# Patient Record
Sex: MALE | Race: WHITE | NOT HISPANIC OR LATINO | Employment: OTHER | URBAN - METROPOLITAN AREA
[De-identification: names, ages, dates, MRNs, and addresses within clinical notes are randomized per-mention and may not be internally consistent; named-entity substitution may affect disease eponyms.]

---

## 2017-02-16 ENCOUNTER — OFFICE VISIT (OUTPATIENT)
Dept: PODIATRY | Facility: CLINIC | Age: 72
End: 2017-02-16
Payer: MEDICARE

## 2017-02-16 VITALS
SYSTOLIC BLOOD PRESSURE: 114 MMHG | BODY MASS INDEX: 21 KG/M2 | HEIGHT: 71 IN | WEIGHT: 150 LBS | DIASTOLIC BLOOD PRESSURE: 60 MMHG

## 2017-02-16 DIAGNOSIS — M20.10 HALLUX ABDUCTO VALGUS, UNSPECIFIED LATERALITY: ICD-10-CM

## 2017-02-16 DIAGNOSIS — E11.49 TYPE II DIABETES MELLITUS WITH NEUROLOGICAL MANIFESTATIONS: ICD-10-CM

## 2017-02-16 DIAGNOSIS — L90.9 PLANTAR FAT PAD ATROPHY: ICD-10-CM

## 2017-02-16 DIAGNOSIS — E11.51 TYPE II DIABETES MELLITUS WITH PERIPHERAL CIRCULATORY DISORDER: Primary | ICD-10-CM

## 2017-02-16 DIAGNOSIS — B35.1 ONYCHOMYCOSIS DUE TO DERMATOPHYTE: ICD-10-CM

## 2017-02-16 DIAGNOSIS — L84 CORN OR CALLUS: ICD-10-CM

## 2017-02-16 DIAGNOSIS — M20.40 HAMMER TOE, UNSPECIFIED LATERALITY: ICD-10-CM

## 2017-02-16 PROCEDURE — 11056 PARNG/CUTG B9 HYPRKR LES 2-4: CPT | Mod: Q9,S$GLB,, | Performed by: PODIATRIST

## 2017-02-16 PROCEDURE — 99999 PR PBB SHADOW E&M-EST. PATIENT-LVL III: CPT | Mod: PBBFAC,,, | Performed by: PODIATRIST

## 2017-02-16 PROCEDURE — 99499 UNLISTED E&M SERVICE: CPT | Mod: S$GLB,,, | Performed by: PODIATRIST

## 2017-02-16 PROCEDURE — 11721 DEBRIDE NAIL 6 OR MORE: CPT | Mod: 59,Q9,S$GLB, | Performed by: PODIATRIST

## 2017-02-16 RX ORDER — SYRINGE,SAFETY WITH NEEDLE,1ML 25GX1"
SYRINGE (EA) MISCELLANEOUS
COMMUNITY
Start: 2017-01-24

## 2017-02-16 NOTE — PROGRESS NOTES
Subjective:      Patient ID: Avni Daniels is a 72 y.o. male.    Chief Complaint: Diabetes Mellitus (pcp Dr. Gusman 02/2017); Diabetic Foot Exam; and Nail Care    Avni is a 72 y.o. male who presents to the clinic for evaluation and treatment of high risk feet. Avni has a past medical history of Diabetes mellitus, type 2 and Hypertension. The patient's chief complaint is elongated, thickened toenails aggravated by increased weight bearing, shoe gear, pressure.    This patient has documented high risk feet requiring routine maintenance secondary to diabetes mellitis and those secondary complications of diabetes, as mentioned..    PCP: Reyes Gusman MD    Date Last Seen by PCP:   Chief Complaint   Patient presents with    Diabetes Mellitus     pcp Dr. Gusman 02/2017    Diabetic Foot Exam    Nail Care       Current shoe gear:   Rx diabetic extra depth shoes and custom accommodative insoles      No results found for: HGBA1C        Patient Active Problem List   Diagnosis    Diabetic ulcer of left foot associated with type 2 diabetes mellitus    Non-pressure chronic ulcer of other part of left foot with fat layer exposed       Current Outpatient Prescriptions on File Prior to Visit   Medication Sig Dispense Refill    aspirin 81 MG Chew Take 81 mg by mouth once daily.      clopidogrel (PLAVIX) 75 mg tablet       enalapril (VASOTEC) 5 MG tablet       gentamicin (GARAMYCIN) 0.1 % cream       insulin NPH-insulin regular, 70/30, (NOVOLIN 70/30) 100 unit/mL (70-30) injection Inject into the skin 2 (two) times daily.      PACERONE 100 mg Tab       pravastatin (PRAVACHOL) 40 MG tablet        No current facility-administered medications on file prior to visit.        Review of patient's allergies indicates:  No Known Allergies    Past Surgical History   Procedure Laterality Date    Appendectomy      Gallbladder surgery      Tumor removal Left      foot       History reviewed. No pertinent family history.    Social  "History     Social History    Marital status:      Spouse name: N/A    Number of children: N/A    Years of education: N/A     Occupational History    Not on file.     Social History Main Topics    Smoking status: Former Smoker    Smokeless tobacco: Not on file    Alcohol use Not on file    Drug use: Not on file    Sexual activity: Not on file     Other Topics Concern    Not on file     Social History Narrative         Review of Systems   Constitution: Negative for chills, fever and weakness.   Cardiovascular: Negative for claudication and leg swelling.   Respiratory: Negative for cough and shortness of breath.    Skin: Positive for dry skin, nail changes and poor wound healing (hx of left foot ulcer). Negative for itching and rash.   Musculoskeletal: Positive for joint pain. Negative for falls, joint swelling and muscle weakness.   Gastrointestinal: Negative for diarrhea, nausea and vomiting.   Neurological: Positive for numbness and paresthesias. Negative for tremors.   Psychiatric/Behavioral: Negative for altered mental status and hallucinations.           Objective:       Vitals:    02/16/17 1014   BP: 114/60   Weight: 68 kg (150 lb)   Height: 5' 11" (1.803 m)   PainSc: 0-No pain       Physical Exam   Constitutional:  Non-toxic appearance. He does not have a sickly appearance. No distress.   Pt. is well-developed, well-nourished, appears stated age, in no acute distress, alert and oriented x 3. No evidence of depression, anxiety, or agitation. Calm, cooperative, and communicative. Appropriate interactions and affect.   Cardiovascular:   Pulses:       Dorsalis pedis pulses are 1+ on the right side, and 1+ on the left side.        Posterior tibial pulses are 1+ on the right side, and 1+ on the left side.   Dorsalis pedis and posterior tibial pulses are diminished Bilaterally. Toes are cool to touch. Feet are warm proximally.There is decreased digital hair. Skin is atrophic   Pulmonary/Chest: No " respiratory distress.   Musculoskeletal:        Right ankle: No tenderness. No lateral malleolus, no medial malleolus, no AITFL, no CF ligament and no posterior TFL tenderness found. Achilles tendon exhibits no pain, no defect and normal Elias's test results.        Left ankle: No tenderness. No lateral malleolus, no medial malleolus, no AITFL, no CF ligament and no posterior TFL tenderness found. Achilles tendon exhibits no pain, no defect and normal Elias's test results.        Right foot: There is no tenderness and no bony tenderness.        Left foot: There is no tenderness and no bony tenderness.   Decreased stride, station of gait.  apropulsive toe off.  Increased angle and base of gait.    Patient has hammertoes of digits 2-5 bilateral partially reducible without symptom today.    Visible and palpable bunion without pain at dorsomedial 1st metatarsal head right and left.  Hallux abducted right and left partially reducible, tracks laterally without being track bound.  No ecchymosis, erythema, edema, or cardinal signs infection or signs of trauma same foot.    Visible and palpable tailors bunion at dorsolateral 5th metatarsal head right and left.  5th digit is varus right and left partially reducible. No ecchymosis, erythema, edema, or cardinal signs infection.    Fat pad atrophy to heels and met heads bilateral     Lymphadenopathy:   No lymphatic streaking    Negative lymphadenopathy bilateral popliteal fossa and tarsal tunnel.     Neurological:   Montgomery-Tamanna 5.07 monofilamant testing is diminished Ryan feet. Decreased/absent vibratory sensation bilateral feet to 128Hz tuning fork.     Paresthesias, and hyperesthesia bilateral feet with no clearly identified trigger or source.           Skin: Skin is warm, dry and intact. No abrasion, no burn, no ecchymosis, no laceration, no petechiae and no rash noted. He is not diaphoretic. No cyanosis. No pallor. Nails show no clubbing.   Skin is thin, atrophic,  xerotic    Toenails 1-5 bilaterally are elongated by 2-3 mm, thickened by 2-3 mm, discolored/yellowed, dystrophic, brittle with subungual debris.    Focal hyperkeratotic lesion consisting entirely of hyperkeratotic tissue without open skin, drainage, pus, fluctuance, malodor, or signs of infection: sub 1st and 5th MTPJ luann   Psychiatric: His mood appears not anxious. His affect is not inappropriate. His speech is not slurred. He is not combative. He is communicative. He is attentive.   Nursing note reviewed.        Assessment:       Encounter Diagnoses   Name Primary?    Type II diabetes mellitus with peripheral circulatory disorder Yes    Type II diabetes mellitus with neurological manifestations     Corn or callus     Onychomycosis due to dermatophyte     Hammer toe, unspecified laterality     Hallux abducto valgus, unspecified laterality     Plantar fat pad atrophy          Plan:       Avni was seen today for diabetes mellitus, diabetic foot exam and nail care.    Diagnoses and all orders for this visit:    Type II diabetes mellitus with peripheral circulatory disorder    Type II diabetes mellitus with neurological manifestations    Corn or callus    Onychomycosis due to dermatophyte    Hammer toe, unspecified laterality    Hallux abducto valgus, unspecified laterality    Plantar fat pad atrophy    I counseled the patient on his conditions, their implications and medical management.    Greater than 50% of this visit spent on counseling and coordination of care.    Shoe inspection. Diabetic Foot Education. Patient reminded of the importance of good nutrition and blood sugar control to help prevent podiatric complications of diabetes. Patient instructed on proper foot hygeine. We discussed wearing proper shoe gear, daily foot inspections, never walking without protective shoe gear, never putting sharp instruments to feet.      - With patient's permission, nails were aggressively reduced and debrided x 10 to  their soft tissue attachment mechanically and with electric , removing all offending nail and debris. Patient relates relief following the procedure.     After cleansing the  area w/ alcohol prep pad the above mentioned hyperkeratosis was trimmed utilizing No 15 scapel, to a smooth base with out incident. Patient tolerated this  well and reported comfort to the area of sub 1st and 5th MTPJ bilaterally    He will continue to monitor the areas daily, inspect her feet, wear protective shoe gear when ambulatory, moisturizer to maintain skin integrity and follow in this office in approximately  2-3 months, sooner p.r.n.

## 2017-02-16 NOTE — MR AVS SNAPSHOT
[facility]  [address]  [place] [address]  Phone: [phone]                  Avni Daniels   2017 10:15 AM   Office Visit    Description:  Male : 1945   Provider:  Miryam Harrell DPM   Department:  [facility]           Reason for Visit     Diabetes Mellitus     Diabetic Foot Exam     Nail Care           Diagnoses this Visit        Comments    Type II diabetes mellitus with peripheral circulatory disorder    -  Primary     Type II diabetes mellitus with neurological manifestations         Corn or callus         Onychomycosis due to dermatophyte         Hammer toe, unspecified laterality         Hallux abducto valgus, unspecified laterality         Plantar fat pad atrophy                To Do List           Future Appointments        Provider Department Dept Phone    2017 10:15 AM Miryam Harrell DPM [facility] [phone]      Goals (5 Years of Data)     None      [facility]     [facility] On Call Nurse Care Line -  Assistance  Registered nurses in the [facility] Center provide clinical advisement, health education, appointment booking, and other advisory services.  Call for this free service at [phone].             Medications           Message regarding Medications     Verify the changes and/or additions to your medication regime listed below are the same as discussed with your clinician today.  If any of these changes or additions are incorrect, please notify your healthcare provider.             Verify that the below list of medications is an accurate representation of the medications you are currently taking.  If none reported, the list may be blank. If incorrect, please contact your healthcare provider. Carry this list with you in case of emergency.           Current Medications     aspirin 81 MG Chew Take 81 mg by mouth once daily.    clopidogrel (PLAVIX) 75 mg tablet     enalapril (VASOTEC) 5 MG tablet     gentamicin (GARAMYCIN) 0.1  "% cream     insulin NPH-insulin regular, 70/30, (NOVOLIN 70/30) 100 unit/mL (70-30) injection Inject into the skin 2 (two) times daily.    insulin syringe-needle U-100 1/2 mL 30 gauge x 5/16 Syrg     PACERONE 100 mg Tab     pravastatin (PRAVACHOL) 40 MG tablet            Clinical Reference Information           Your Vitals Were     BP Height Weight BMI       114/60 5' 11" (1.803 m) 68 kg (150 lb) 20.92 kg/m2       Blood Pressure          Most Recent Value    BP  114/60      Allergies as of 2/16/2017     No Known Allergies      Immunizations Administered on Date of Encounter - 2/16/2017     None      [facility] Sign-Up     Activating your [facility] account is as easy as 1-2-3!     1) Visit [email or web address], select Sign Up Now, enter this activation code and your date of birth, then select Next.  [email or web address]  Expires: 4/2/2017 10:49 AM      2) Create a username and password to use when you visit [facility] in the future and select a security question in case you lose your password and select Next.    3) Enter your e-mail address and click Sign Up!    Additional Information  If you have questions, please e-mail [email or web address] or call [phone] to talk to our [facility] staff. Remember, [facility] is NOT to be used for urgent needs. For medical emergencies, dial 911.         Instructions    Recommend lotions: eucerin, aquaphor, A&D ointment, gold bond for diabetics, sween    Shoe recommendations: (try 6pm.com, zappos.com , nordstromrack.com, or shoes.com for discounted prices) you can visit DSW shoes in [place] as well    Asics (GT 1000 or gel foundations), new balance, saucony (stabil c3),  Drew (transcend), vionic, propet (tennis shoe)    soft brand, clarks, crocs, aerosoles, naturalizers, SAS, ecco, rafael, ant good, rockports (dress shoes)    Vionic, volitiles, burkenstocks, fitflops, propet (sandals)    Nike comfort thong sandals, crocs (house shoes)    Nail Home remedy:  Vicks Vapor rub OR Listerine " and apple cider vinegar in a spray bottle to nails    Occasional soaks for 15-20 mins in luke warm water with 1 cup of listerine and 1 cup of apple cider vinegar are ok You may add several drops of oil of oregano or tea tree oil as well      Diabetes: Inspecting Your Feet  Diabetes increases your chances of developing foot problems. So inspect your feet every day. This helps you find small skin irritations before they become serious infections. If you have trouble seeing the bottoms of your feet, use a mirror or ask a family member or friend to help.     Pressure spots on the bottom of the foot are common areas where problems develop.   How to check your feet  Below are tips to help you look for foot problems. Try to check your feet at the same time each day, such as when you get out of bed in the morning:  · Check the top of each foot. The tops of toes, back of the heel, and outer edge of the foot can get a lot of rubbing from poor-fitting shoes.  · Check the bottom of each foot. Daily wear and tear often leads to problems at pressure spots.  · Check the toes and nails. Fungal infections often occur between toes. Toenail problems can also be a sign of fungal infections or lead to breaks in the skin.  · Check your shoes, too. Loose objects inside a shoe can injure the foot. Use your hand to feel inside your shoes for things like robert, loose stitching, or rough areas that could irritate your skin.  Warning signs  Look for any color changes in the foot. Redness with streaks can signal a severe infection, which needs immediate medical attention. Tell your doctor right away if you have any of these problems:  · Swelling, sometimes with color changes, may be a sign of poor blood flow or infection. Symptoms include tenderness and an increase in the size of your foot.  · Warm or hot areas on your feet may be signs of infection. A foot that is cold may not be getting enough blood.  · Sensations such as burning, tingling,  or pins and needles can be signs of a problem. Also check for areas that may be numb.  · Hot spots are caused by friction or pressure. Look for hot spots in areas that get a lot of rubbing. Hot spots can turn into blisters, calluses, or sores.  · Cracks and sores are caused by dry or irritated skin. They are a sign that the skin is breaking down, which can lead to infection.  · Toenail problems to watch for include nails growing into the skin (ingrown toenail) and causing redness or pain. Thick, yellow, or discolored nails can signal a fungal infection.  · Drainage and odor can develop from untreated sores and ulcers. Call your doctor right away if you notice white or yellow drainage, bleeding, or unpleasant odor.   © 4185-4772 [employer]. [address] [place] [address]. All rights reserved. This information is not intended as a substitute for professional medical care. Always follow your healthcare professional's instructions.                 Language Assistance Services     ATTENTION: Language assistance services are available, free of charge. Please call [phone].      ATENCIÓN: Si warren boggs, tiene a salazar disposición servicios gratuitos de asistencia lingüística. Llame al [phone].     KARY Ý: N?u b?n nói Ti?ng Vi?t, có các d?ch v? h? tr? ngôn ng? mi?n phí dành cho b?n. G?i s? [phone].         [facility] complies with applicable Federal civil rights laws and does not discriminate on the basis of race, color, national origin, age, disability, or sex.

## 2017-02-16 NOTE — PATIENT INSTRUCTIONS
Recommend lotions: eucerin, aquaphor, A&D ointment, gold bond for diabetics, sween    Shoe recommendations: (try 6pm.com, zappos.com , nordstromrack.com, or shoes.com for discounted prices) you can visit DSW shoes in [place] as well    Asics (GT 1000 or gel foundations), new balance, saucony (stabil c3),  Drew (transcend), vionic, propet (tennis shoe)    soft brand, clarks, crocs, aerosoles, naturalizers, SAS, ecco, rafael, ant good, rockports (dress shoes)    Vionic, volitiles, burkenstocks, fitflops, propet (sandals)    Nike comfort thong sandals, crocs (house shoes)    Nail Home remedy:  Vicks Vapor rub OR Listerine and apple cider vinegar in a spray bottle to nails    Occasional soaks for 15-20 mins in luke warm water with 1 cup of listerine and 1 cup of apple cider vinegar are ok You may add several drops of oil of oregano or tea tree oil as well      Diabetes: Inspecting Your Feet  Diabetes increases your chances of developing foot problems. So inspect your feet every day. This helps you find small skin irritations before they become serious infections. If you have trouble seeing the bottoms of your feet, use a mirror or ask a family member or friend to help.     Pressure spots on the bottom of the foot are common areas where problems develop.   How to check your feet  Below are tips to help you look for foot problems. Try to check your feet at the same time each day, such as when you get out of bed in the morning:  · Check the top of each foot. The tops of toes, back of the heel, and outer edge of the foot can get a lot of rubbing from poor-fitting shoes.  · Check the bottom of each foot. Daily wear and tear often leads to problems at pressure spots.  · Check the toes and nails. Fungal infections often occur between toes. Toenail problems can also be a sign of fungal infections or lead to breaks in the skin.  · Check your shoes, too. Loose objects inside a shoe can injure the foot. Use your hand to feel  inside your shoes for things like robert, loose stitching, or rough areas that could irritate your skin.  Warning signs  Look for any color changes in the foot. Redness with streaks can signal a severe infection, which needs immediate medical attention. Tell your doctor right away if you have any of these problems:  · Swelling, sometimes with color changes, may be a sign of poor blood flow or infection. Symptoms include tenderness and an increase in the size of your foot.  · Warm or hot areas on your feet may be signs of infection. A foot that is cold may not be getting enough blood.  · Sensations such as burning, tingling, or pins and needles can be signs of a problem. Also check for areas that may be numb.  · Hot spots are caused by friction or pressure. Look for hot spots in areas that get a lot of rubbing. Hot spots can turn into blisters, calluses, or sores.  · Cracks and sores are caused by dry or irritated skin. They are a sign that the skin is breaking down, which can lead to infection.  · Toenail problems to watch for include nails growing into the skin (ingrown toenail) and causing redness or pain. Thick, yellow, or discolored nails can signal a fungal infection.  · Drainage and odor can develop from untreated sores and ulcers. Call your doctor right away if you notice white or yellow drainage, bleeding, or unpleasant odor.   © 4599-3295 [employer]. [address] [place] [address], [place] [address]. All rights reserved. This information is not intended as a substitute for professional medical care. Always follow your healthcare professional's instructions.

## 2017-05-18 ENCOUNTER — OFFICE VISIT (OUTPATIENT)
Dept: PODIATRY | Facility: CLINIC | Age: 72
End: 2017-05-18
Payer: MEDICARE

## 2017-05-18 VITALS
HEIGHT: 71 IN | BODY MASS INDEX: 21 KG/M2 | WEIGHT: 150 LBS | SYSTOLIC BLOOD PRESSURE: 114 MMHG | DIASTOLIC BLOOD PRESSURE: 66 MMHG

## 2017-05-18 DIAGNOSIS — E11.49 TYPE II DIABETES MELLITUS WITH NEUROLOGICAL MANIFESTATIONS: ICD-10-CM

## 2017-05-18 DIAGNOSIS — E11.51 TYPE II DIABETES MELLITUS WITH PERIPHERAL CIRCULATORY DISORDER: Primary | ICD-10-CM

## 2017-05-18 DIAGNOSIS — L84 CORN OR CALLUS: ICD-10-CM

## 2017-05-18 DIAGNOSIS — B35.1 ONYCHOMYCOSIS DUE TO DERMATOPHYTE: ICD-10-CM

## 2017-05-18 PROCEDURE — 11056 PARNG/CUTG B9 HYPRKR LES 2-4: CPT | Mod: Q9,S$GLB,, | Performed by: PODIATRIST

## 2017-05-18 PROCEDURE — 11721 DEBRIDE NAIL 6 OR MORE: CPT | Mod: 59,Q9,S$GLB, | Performed by: PODIATRIST

## 2017-05-18 PROCEDURE — 99999 PR PBB SHADOW E&M-EST. PATIENT-LVL II: CPT | Mod: PBBFAC,,, | Performed by: PODIATRIST

## 2017-05-18 PROCEDURE — 99499 UNLISTED E&M SERVICE: CPT | Mod: S$GLB,,, | Performed by: PODIATRIST

## 2017-05-18 RX ORDER — CALCITRIOL 0.25 UG/1
CAPSULE ORAL
COMMUNITY
Start: 2017-04-06 | End: 2018-06-12

## 2017-05-18 NOTE — PROGRESS NOTES
Subjective:      Patient ID: Avni Daniels is a 72 y.o. male.    Chief Complaint: Diabetic Foot Exam (Pcp Dr. Gusman 02/2017); Diabetes Mellitus; Nail Care; and Callouses    Avni is a 72 y.o. male who presents to the clinic for evaluation and treatment of high risk feet. Avni has a past medical history of Diabetes mellitus, type 2 and Hypertension. The patient's chief complaint is elongated, thickened toenails aggravated by increased weight bearing, shoe gear, pressure.    This patient has documented high risk feet requiring routine maintenance secondary to diabetes mellitis and those secondary complications of diabetes, as mentioned..    PCP: Reyes Gusman MD    Date Last Seen by PCP:   Chief Complaint   Patient presents with    Diabetic Foot Exam     Pcp Dr. Gusman 02/2017    Diabetes Mellitus    Nail Care    Callouses       Current shoe gear:   Rx diabetic extra depth shoes and custom accommodative insoles      No results found for: HGBA1C        Patient Active Problem List   Diagnosis    Diabetic ulcer of left foot associated with type 2 diabetes mellitus    Non-pressure chronic ulcer of other part of left foot with fat layer exposed       Current Outpatient Prescriptions on File Prior to Visit   Medication Sig Dispense Refill    aspirin 81 MG Chew Take 81 mg by mouth once daily.      clopidogrel (PLAVIX) 75 mg tablet       enalapril (VASOTEC) 5 MG tablet 10 mg.       gentamicin (GARAMYCIN) 0.1 % cream       insulin NPH-insulin regular, 70/30, (NOVOLIN 70/30) 100 unit/mL (70-30) injection Inject into the skin 2 (two) times daily.      insulin syringe-needle U-100 1/2 mL 30 gauge x 5/16 Syrg       PACERONE 100 mg Tab       pravastatin (PRAVACHOL) 40 MG tablet        No current facility-administered medications on file prior to visit.        Review of patient's allergies indicates:  No Known Allergies    Past Surgical History:   Procedure Laterality Date    APPENDECTOMY      GALLBLADDER SURGERY   "    TUMOR REMOVAL Left     foot       No family history on file.    Social History     Social History    Marital status:      Spouse name: N/A    Number of children: N/A    Years of education: N/A     Occupational History    Not on file.     Social History Main Topics    Smoking status: Former Smoker    Smokeless tobacco: Not on file    Alcohol use Not on file    Drug use: Not on file    Sexual activity: Not on file     Other Topics Concern    Not on file     Social History Narrative         Review of Systems   Constitution: Negative for chills, fever and weakness.   Cardiovascular: Negative for claudication and leg swelling.   Respiratory: Negative for cough and shortness of breath.    Skin: Positive for dry skin, nail changes and poor wound healing (hx of left foot ulcer). Negative for itching and rash.   Musculoskeletal: Positive for joint pain. Negative for falls, joint swelling and muscle weakness.   Gastrointestinal: Negative for diarrhea, nausea and vomiting.   Neurological: Positive for numbness and paresthesias. Negative for tremors.   Psychiatric/Behavioral: Negative for altered mental status and hallucinations.           Objective:       Vitals:    05/18/17 1031   BP: 114/66   Weight: 68 kg (150 lb)   Height: 5' 11" (1.803 m)   PainSc: 0-No pain       Physical Exam   Constitutional:  Non-toxic appearance. He does not have a sickly appearance. No distress.   Pt. is well-developed, well-nourished, appears stated age, in no acute distress, alert and oriented x 3. No evidence of depression, anxiety, or agitation. Calm, cooperative, and communicative. Appropriate interactions and affect.   Cardiovascular:   Pulses:       Dorsalis pedis pulses are 1+ on the right side, and 1+ on the left side.        Posterior tibial pulses are 1+ on the right side, and 1+ on the left side.   Dorsalis pedis and posterior tibial pulses are diminished Bilaterally. Toes are cool to touch. Feet are warm " proximally.There is decreased digital hair. Skin is atrophic   Pulmonary/Chest: No respiratory distress.   Musculoskeletal:        Right ankle: No tenderness. No lateral malleolus, no medial malleolus, no AITFL, no CF ligament and no posterior TFL tenderness found. Achilles tendon exhibits no pain, no defect and normal Elias's test results.        Left ankle: No tenderness. No lateral malleolus, no medial malleolus, no AITFL, no CF ligament and no posterior TFL tenderness found. Achilles tendon exhibits no pain, no defect and normal Elias's test results.        Right foot: There is no tenderness and no bony tenderness.        Left foot: There is no tenderness and no bony tenderness.   Decreased stride, station of gait.  apropulsive toe off.  Increased angle and base of gait.    Patient has hammertoes of digits 2-5 bilateral partially reducible without symptom today.    Visible and palpable bunion without pain at dorsomedial 1st metatarsal head right and left.  Hallux abducted right and left partially reducible, tracks laterally without being track bound.  No ecchymosis, erythema, edema, or cardinal signs infection or signs of trauma same foot.    Visible and palpable tailors bunion at dorsolateral 5th metatarsal head right and left.  5th digit is varus right and left partially reducible. No ecchymosis, erythema, edema, or cardinal signs infection.    Fat pad atrophy to heels and met heads bilateral     Lymphadenopathy:   No lymphatic streaking    Negative lymphadenopathy bilateral popliteal fossa and tarsal tunnel.     Neurological:   Galloway-Tamanna 5.07 monofilamant testing is diminished Ryan feet. Decreased/absent vibratory sensation bilateral feet to 128Hz tuning fork.     Paresthesias, and hyperesthesia bilateral feet with no clearly identified trigger or source.           Skin: Skin is warm, dry and intact. No abrasion, no burn, no ecchymosis, no laceration, no petechiae and no rash noted. He is not  diaphoretic. No cyanosis. No pallor. Nails show no clubbing.   Skin is thin, atrophic, xerotic    Toenails 1-5 bilaterally are elongated by 2-3 mm, thickened by 2-3 mm, discolored/yellowed, dystrophic, brittle with subungual debris.    Focal hyperkeratotic lesion consisting entirely of hyperkeratotic tissue without open skin, drainage, pus, fluctuance, malodor, or signs of infection: sub 1st and 5th MTPJ luann   Psychiatric: His mood appears not anxious. His affect is not inappropriate. His speech is not slurred. He is not combative. He is communicative. He is attentive.   Nursing note reviewed.        Assessment:       Encounter Diagnoses   Name Primary?    Type II diabetes mellitus with peripheral circulatory disorder Yes    Type II diabetes mellitus with neurological manifestations     Onychomycosis due to dermatophyte     Corn or callus          Plan:       Avni was seen today for diabetic foot exam, diabetes mellitus, nail care and callouses.    Diagnoses and all orders for this visit:    Type II diabetes mellitus with peripheral circulatory disorder    Type II diabetes mellitus with neurological manifestations    Onychomycosis due to dermatophyte    Corn or callus      I counseled the patient on his conditions, their implications and medical management.    Shoe inspection. Diabetic Foot Education. Patient reminded of the importance of good nutrition and blood sugar control to help prevent podiatric complications of diabetes. Patient instructed on proper foot hygeine. We discussed wearing proper shoe gear, daily foot inspections, never walking without protective shoe gear, never putting sharp instruments to feet.      - With patient's permission, nails were aggressively reduced and debrided x 10 to their soft tissue attachment mechanically and with electric , removing all offending nail and debris. Patient relates relief following the procedure.     After cleansing the  area w/ alcohol prep pad the  above mentioned hyperkeratosis was trimmed utilizing No 15 scapel, to a smooth base with out incident. Patient tolerated this  well and reported comfort to the area of sub 1st and 5th MTPJ bilaterally    He will continue to monitor the areas daily, inspect her feet, wear protective shoe gear when ambulatory, moisturizer to maintain skin integrity and follow in this office in approximately  2-3 months, sooner p.r.n.

## 2017-05-18 NOTE — MR AVS SNAPSHOT
[facility]  [address] [facility]  [place] [address]  Phone: [phone]                  Avni Daniels   2017 10:15 AM   Office Visit    Description:  Male : 1945   Provider:  Miryam Harrell DPM   Department:  [facility]           Reason for Visit     Diabetic Foot Exam     Diabetes Mellitus     Nail Care     Callouses                To Do List           Future Appointments        Provider Department Dept Phone    2017 10:30 AM Miryam Harrell DPM [facility] [phone]      Goals (5 Years of Data)     None      [facility]     [facility] -  Assistance  Unless otherwise directed by your provider, please contact [facility], our nurse care line that is available for  assistance.     Registered nurses in the [facility] Center provide: appointment scheduling, clinical advisement, health education, and other advisory services.  Call: [phone] (toll free)               Medications           Message regarding Medications     Verify the changes and/or additions to your medication regime listed below are the same as discussed with your clinician today.  If any of these changes or additions are incorrect, please notify your healthcare provider.             Verify that the below list of medications is an accurate representation of the medications you are currently taking.  If none reported, the list may be blank. If incorrect, please contact your healthcare provider. Carry this list with you in case of emergency.           Current Medications     aspirin 81 MG Chew Take 81 mg by mouth once daily.    calcitRIOL (ROCALTROL) 0.25 MCG Cap     clopidogrel (PLAVIX) 75 mg tablet     enalapril (VASOTEC) 5 MG tablet 10 mg.     gentamicin (GARAMYCIN) 0.1 % cream     insulin NPH-insulin regular, 70/30, (NOVOLIN 70/30) 100 unit/mL (70-30) injection Inject into the skin 2 (two) times daily.    insulin syringe-needle U-100 1/2 mL 30 gauge x 5/16  "Syrg     PACERONE 100 mg Tab     pravastatin (PRAVACHOL) 40 MG tablet            Clinical Reference Information           Your Vitals Were     BP Height Weight BMI       114/66 5' 11" (1.803 m) 68 kg (150 lb) 20.92 kg/m2       Blood Pressure          Most Recent Value    BP  114/66      Allergies as of 5/18/2017     No Known Allergies      Immunizations Administered on Date of Encounter - 5/18/2017     None      [facility] Sign-Up     Activating your [facility] account is as easy as 1-2-3!     1) Visit [email or web address], select Sign Up Now, enter this activation code and your date of birth, then select Next.  [email or web address]  Expires: 7/2/2017 10:55 AM      2) Create a username and password to use when you visit [facility] in the future and select a security question in case you lose your password and select Next.    3) Enter your e-mail address and click Sign Up!    Additional Information  If you have questions, please e-mail [email or web address] or call [phone] to talk to our [facility] staff. Remember, [facility] is NOT to be used for urgent needs. For medical emergencies, dial 911.         Language Assistance Services     ATTENTION: Language assistance services are available, free of charge. Please call [phone].      ATENCIÓN: Si habla español, tiene a salazar disposición servicios gratuitos de asistencia lingüística. Llame al [phone].     CHÚ Ý: N?u b?n nói Ti?ng Vi?t, có các d?ch v? h? tr? ngôn ng? mi?n phí dành cho b?n. G?i s? [phone].         [facility] complies with applicable Federal civil rights laws and does not discriminate on the basis of race, color, national origin, age, disability, or sex.        "

## 2017-08-21 ENCOUNTER — OFFICE VISIT (OUTPATIENT)
Dept: PODIATRY | Facility: CLINIC | Age: 72
End: 2017-08-21
Payer: MEDICARE

## 2017-08-21 VITALS
HEIGHT: 71 IN | WEIGHT: 150 LBS | DIASTOLIC BLOOD PRESSURE: 67 MMHG | BODY MASS INDEX: 21 KG/M2 | SYSTOLIC BLOOD PRESSURE: 110 MMHG

## 2017-08-21 DIAGNOSIS — B35.1 ONYCHOMYCOSIS DUE TO DERMATOPHYTE: ICD-10-CM

## 2017-08-21 DIAGNOSIS — E11.49 TYPE II DIABETES MELLITUS WITH NEUROLOGICAL MANIFESTATIONS: Primary | ICD-10-CM

## 2017-08-21 DIAGNOSIS — M20.40 HAMMER TOE, UNSPECIFIED LATERALITY: ICD-10-CM

## 2017-08-21 DIAGNOSIS — M20.10 HALLUX ABDUCTO VALGUS, UNSPECIFIED LATERALITY: ICD-10-CM

## 2017-08-21 DIAGNOSIS — E11.51 TYPE II DIABETES MELLITUS WITH PERIPHERAL CIRCULATORY DISORDER: ICD-10-CM

## 2017-08-21 DIAGNOSIS — L84 CORN OR CALLUS: ICD-10-CM

## 2017-08-21 DIAGNOSIS — L90.9 PLANTAR FAT PAD ATROPHY: ICD-10-CM

## 2017-08-21 PROCEDURE — 11056 PARNG/CUTG B9 HYPRKR LES 2-4: CPT | Mod: Q9,S$GLB,, | Performed by: PODIATRIST

## 2017-08-21 PROCEDURE — 99499 UNLISTED E&M SERVICE: CPT | Mod: S$GLB,,, | Performed by: PODIATRIST

## 2017-08-21 PROCEDURE — 11721 DEBRIDE NAIL 6 OR MORE: CPT | Mod: 59,Q9,S$GLB, | Performed by: PODIATRIST

## 2017-08-21 PROCEDURE — 99999 PR PBB SHADOW E&M-EST. PATIENT-LVL III: CPT | Mod: PBBFAC,,, | Performed by: PODIATRIST

## 2017-08-21 RX ORDER — ENALAPRIL MALEATE 10 MG/1
TABLET ORAL
COMMUNITY
Start: 2017-08-03 | End: 2018-06-12 | Stop reason: DRUGHIGH

## 2017-08-21 RX ORDER — CALCITRIOL 0.5 UG/1
CAPSULE ORAL
COMMUNITY
Start: 2017-07-07 | End: 2018-03-13

## 2017-08-22 NOTE — PROGRESS NOTES
Subjective:      Patient ID: Avni Daniels is a 72 y.o. male.    Chief Complaint: Diabetes Mellitus (pcp Dr. Gusman 2mos ago ); Diabetic Foot Exam; and Nail Care    Avni is a 72 y.o. male who presents to the clinic for evaluation and treatment of high risk feet. Avni has a past medical history of Diabetes mellitus, type 2 and Hypertension. The patient's chief complaint is elongated, thickened toenails aggravated by increased weight bearing, shoe gear, pressure.    This patient has documented high risk feet requiring routine maintenance secondary to diabetes mellitis and those secondary complications of diabetes, as mentioned..    PCP: Reyes Gusman MD    Date Last Seen by PCP:   Chief Complaint   Patient presents with    Diabetes Mellitus     pcp Dr. Gusman 2mos ago     Diabetic Foot Exam    Nail Care       Current shoe gear:   Rx diabetic extra depth shoes and custom accommodative insoles      No results found for: HGBA1C        Patient Active Problem List   Diagnosis    Diabetic ulcer of left foot associated with type 2 diabetes mellitus    Non-pressure chronic ulcer of other part of left foot with fat layer exposed       Current Outpatient Prescriptions on File Prior to Visit   Medication Sig Dispense Refill    aspirin 81 MG Chew Take 81 mg by mouth once daily.      calcitRIOL (ROCALTROL) 0.25 MCG Cap       clopidogrel (PLAVIX) 75 mg tablet       enalapril (VASOTEC) 5 MG tablet 10 mg.       gentamicin (GARAMYCIN) 0.1 % cream       insulin NPH-insulin regular, 70/30, (NOVOLIN 70/30) 100 unit/mL (70-30) injection Inject into the skin 2 (two) times daily.      insulin syringe-needle U-100 1/2 mL 30 gauge x 5/16 Syrg       PACERONE 100 mg Tab       pravastatin (PRAVACHOL) 40 MG tablet        No current facility-administered medications on file prior to visit.        Review of patient's allergies indicates:  No Known Allergies    Past Surgical History:   Procedure Laterality Date    APPENDECTOMY    "   GALLBLADDER SURGERY      TUMOR REMOVAL Left     foot       History reviewed. No pertinent family history.    Social History     Social History    Marital status:      Spouse name: N/A    Number of children: N/A    Years of education: N/A     Occupational History    Not on file.     Social History Main Topics    Smoking status: Former Smoker    Smokeless tobacco: Former User    Alcohol use Not on file    Drug use: Unknown    Sexual activity: Not on file     Other Topics Concern    Not on file     Social History Narrative    No narrative on file         Review of Systems   Constitution: Negative for chills, fever and weakness.   Cardiovascular: Negative for claudication and leg swelling.   Respiratory: Negative for cough and shortness of breath.    Skin: Positive for dry skin, nail changes and poor wound healing (hx of left foot ulcer). Negative for itching and rash.   Musculoskeletal: Positive for joint pain. Negative for falls, joint swelling and muscle weakness.   Gastrointestinal: Negative for diarrhea, nausea and vomiting.   Neurological: Positive for numbness and paresthesias. Negative for tremors.   Psychiatric/Behavioral: Negative for altered mental status and hallucinations.           Objective:       Vitals:    08/21/17 1057   BP: 110/67   Weight: 68 kg (150 lb)   Height: 5' 11" (1.803 m)   PainSc: 0-No pain       Physical Exam   Constitutional:  Non-toxic appearance. He does not have a sickly appearance. No distress.   Pt. is well-developed, well-nourished, appears stated age, in no acute distress, alert and oriented x 3. No evidence of depression, anxiety, or agitation. Calm, cooperative, and communicative. Appropriate interactions and affect.   Cardiovascular:   Pulses:       Dorsalis pedis pulses are 1+ on the right side, and 1+ on the left side.        Posterior tibial pulses are 1+ on the right side, and 1+ on the left side.   Dorsalis pedis and posterior tibial pulses are " diminished Bilaterally. Toes are cool to touch. Feet are warm proximally.There is decreased digital hair. Skin is atrophic   Pulmonary/Chest: No respiratory distress.   Musculoskeletal:        Right ankle: No tenderness. No lateral malleolus, no medial malleolus, no AITFL, no CF ligament and no posterior TFL tenderness found. Achilles tendon exhibits no pain, no defect and normal Elias's test results.        Left ankle: No tenderness. No lateral malleolus, no medial malleolus, no AITFL, no CF ligament and no posterior TFL tenderness found. Achilles tendon exhibits no pain, no defect and normal Elias's test results.        Right foot: There is no tenderness and no bony tenderness.        Left foot: There is no tenderness and no bony tenderness.   Decreased stride, station of gait.  apropulsive toe off.  Increased angle and base of gait.    Patient has hammertoes of digits 2-5 bilateral partially reducible without symptom today.    Visible and palpable bunion without pain at dorsomedial 1st metatarsal head right and left.  Hallux abducted right and left partially reducible, tracks laterally without being track bound.  No ecchymosis, erythema, edema, or cardinal signs infection or signs of trauma same foot.    Visible and palpable tailors bunion at dorsolateral 5th metatarsal head right and left.  5th digit is varus right and left partially reducible. No ecchymosis, erythema, edema, or cardinal signs infection.    Fat pad atrophy to heels and met heads bilateral     Lymphadenopathy:   No lymphatic streaking    Negative lymphadenopathy bilateral popliteal fossa and tarsal tunnel.     Neurological:   Joshua-Tamanna 5.07 monofilamant testing is diminished Ryan feet. Decreased/absent vibratory sensation bilateral feet to 128Hz tuning fork.     Paresthesias, and hyperesthesia bilateral feet with no clearly identified trigger or source.           Skin: Skin is warm, dry and intact. No abrasion, no burn, no ecchymosis,  no laceration, no petechiae and no rash noted. He is not diaphoretic. No cyanosis. No pallor. Nails show no clubbing.   Skin is thin, atrophic, xerotic    Toenails 1-5 bilaterally are elongated by 2-3 mm, thickened by 2-3 mm, discolored/yellowed, dystrophic, brittle with subungual debris.    Focal hyperkeratotic lesion consisting entirely of hyperkeratotic tissue without open skin, drainage, pus, fluctuance, malodor, or signs of infection: sub 1st and 5th MTPJ luann   Psychiatric: His mood appears not anxious. His affect is not inappropriate. His speech is not slurred. He is not combative. He is communicative. He is attentive.   Nursing note reviewed.        Assessment:       Encounter Diagnoses   Name Primary?    Type II diabetes mellitus with neurological manifestations Yes    Onychomycosis due to dermatophyte     Corn or callus     Type II diabetes mellitus with peripheral circulatory disorder     Hammer toe, unspecified laterality     Plantar fat pad atrophy     Hallux abducto valgus, unspecified laterality          Plan:       Avni was seen today for diabetes mellitus, diabetic foot exam and nail care.    Diagnoses and all orders for this visit:    Type II diabetes mellitus with neurological manifestations    Onychomycosis due to dermatophyte    Corn or callus    Type II diabetes mellitus with peripheral circulatory disorder    Hammer toe, unspecified laterality    Plantar fat pad atrophy    Hallux abducto valgus, unspecified laterality      I counseled the patient on his conditions, their implications and medical management.    Shoe inspection. Diabetic Foot Education. Patient reminded of the importance of good nutrition and blood sugar control to help prevent podiatric complications of diabetes. Patient instructed on proper foot hygeine. We discussed wearing proper shoe gear, daily foot inspections, never walking without protective shoe gear, never putting sharp instruments to feet.      - With patient's  permission, nails were aggressively reduced and debrided x 10 to their soft tissue attachment mechanically and with electric , removing all offending nail and debris. Patient relates relief following the procedure.     After cleansing the  area w/ alcohol prep pad the above mentioned hyperkeratosis was trimmed utilizing No 15 scapel, to a smooth base with out incident. Patient tolerated this  well and reported comfort to the area of sub 1st and 5th MTPJ bilaterally    He will continue to monitor the areas daily, inspect her feet, wear protective shoe gear when ambulatory, moisturizer to maintain skin integrity and follow in this office in approximately  2-3 months, sooner p.r.n.

## 2017-11-20 ENCOUNTER — OFFICE VISIT (OUTPATIENT)
Dept: PODIATRY | Facility: CLINIC | Age: 72
End: 2017-11-20
Payer: MEDICARE

## 2017-11-20 VITALS
SYSTOLIC BLOOD PRESSURE: 120 MMHG | HEIGHT: 71 IN | DIASTOLIC BLOOD PRESSURE: 60 MMHG | WEIGHT: 150 LBS | BODY MASS INDEX: 21 KG/M2

## 2017-11-20 DIAGNOSIS — M20.12 HALLUX ABDUCTO VALGUS, LEFT: ICD-10-CM

## 2017-11-20 DIAGNOSIS — M20.11 HALLUX ABDUCTO VALGUS, RIGHT: ICD-10-CM

## 2017-11-20 DIAGNOSIS — E11.49 TYPE II DIABETES MELLITUS WITH NEUROLOGICAL MANIFESTATIONS: Primary | ICD-10-CM

## 2017-11-20 DIAGNOSIS — M20.40 HAMMER TOE, UNSPECIFIED LATERALITY: ICD-10-CM

## 2017-11-20 DIAGNOSIS — B35.1 ONYCHOMYCOSIS DUE TO DERMATOPHYTE: ICD-10-CM

## 2017-11-20 DIAGNOSIS — L90.9 PLANTAR FAT PAD ATROPHY: ICD-10-CM

## 2017-11-20 DIAGNOSIS — L84 CORN OR CALLUS: ICD-10-CM

## 2017-11-20 PROCEDURE — 11721 DEBRIDE NAIL 6 OR MORE: CPT | Mod: 59,Q9,S$GLB, | Performed by: PODIATRIST

## 2017-11-20 PROCEDURE — 99499 UNLISTED E&M SERVICE: CPT | Mod: S$GLB,,, | Performed by: PODIATRIST

## 2017-11-20 PROCEDURE — 11056 PARNG/CUTG B9 HYPRKR LES 2-4: CPT | Mod: Q9,S$GLB,, | Performed by: PODIATRIST

## 2017-11-20 PROCEDURE — 99999 PR PBB SHADOW E&M-EST. PATIENT-LVL III: CPT | Mod: PBBFAC,,, | Performed by: PODIATRIST

## 2017-11-20 RX ORDER — NAPROXEN SODIUM 220 MG
TABLET ORAL
COMMUNITY
Start: 2017-08-29

## 2017-11-20 NOTE — PROGRESS NOTES
Subjective:      Patient ID: Avni Daniels is a 72 y.o. male.    Chief Complaint: Diabetes Mellitus (pcp Dr. Gusman 09/05/2017); Diabetic Foot Exam; and Nail Care    Avni is a 72 y.o. male who presents to the clinic for evaluation and treatment of high risk feet. Avni has a past medical history of Diabetes mellitus, type 2 and Hypertension. The patient's chief complaint is elongated, thickened toenails aggravated by increased weight bearing, shoe gear, pressure.    This patient has documented high risk feet requiring routine maintenance secondary to diabetes mellitis and those secondary complications of diabetes, as mentioned..    PCP: Reyes Gusman MD    Date Last Seen by PCP:   Chief Complaint   Patient presents with    Diabetes Mellitus     pcp Dr. Gusman 09/05/2017    Diabetic Foot Exam    Nail Care       Current shoe gear:   Rx diabetic extra depth shoes and custom accommodative insoles      No results found for: HGBA1C        Patient Active Problem List   Diagnosis    Diabetic ulcer of left foot associated with type 2 diabetes mellitus    Non-pressure chronic ulcer of other part of left foot with fat layer exposed       Current Outpatient Prescriptions on File Prior to Visit   Medication Sig Dispense Refill    aspirin 81 MG Chew Take 81 mg by mouth once daily.      calcitRIOL (ROCALTROL) 0.25 MCG Cap       calcitRIOL (ROCALTROL) 0.5 MCG Cap       clopidogrel (PLAVIX) 75 mg tablet       enalapril (VASOTEC) 10 MG tablet       enalapril (VASOTEC) 5 MG tablet 10 mg.       gentamicin (GARAMYCIN) 0.1 % cream       insulin NPH-insulin regular, 70/30, (NOVOLIN 70/30) 100 unit/mL (70-30) injection Inject into the skin 2 (two) times daily.      insulin syringe-needle U-100 1/2 mL 30 gauge x 5/16 Syrg       PACERONE 100 mg Tab       pravastatin (PRAVACHOL) 40 MG tablet        No current facility-administered medications on file prior to visit.        Review of patient's allergies indicates:  No Known  "Allergies    Past Surgical History:   Procedure Laterality Date    APPENDECTOMY      GALLBLADDER SURGERY      TUMOR REMOVAL Left     foot       History reviewed. No pertinent family history.    Social History     Social History    Marital status:      Spouse name: N/A    Number of children: N/A    Years of education: N/A     Occupational History    Not on file.     Social History Main Topics    Smoking status: Former Smoker    Smokeless tobacco: Former User    Alcohol use Not on file    Drug use: Unknown    Sexual activity: Not on file     Other Topics Concern    Not on file     Social History Narrative    No narrative on file         Review of Systems   Constitution: Negative for chills, fever and weakness.   Cardiovascular: Negative for claudication and leg swelling.   Respiratory: Negative for cough and shortness of breath.    Skin: Positive for dry skin, nail changes and poor wound healing (hx of left foot ulcer). Negative for itching and rash.   Musculoskeletal: Positive for joint pain. Negative for falls, joint swelling and muscle weakness.   Gastrointestinal: Negative for diarrhea, nausea and vomiting.   Neurological: Positive for numbness and paresthesias. Negative for tremors.   Psychiatric/Behavioral: Negative for altered mental status and hallucinations.           Objective:       Vitals:    11/20/17 1036   BP: 120/60   Weight: 68 kg (150 lb)   Height: 5' 11" (1.803 m)   PainSc: 0-No pain       Physical Exam   Constitutional:  Non-toxic appearance. He does not have a sickly appearance. No distress.   Pt. is well-developed, well-nourished, appears stated age, in no acute distress, alert and oriented x 3. No evidence of depression, anxiety, or agitation. Calm, cooperative, and communicative. Appropriate interactions and affect.   Cardiovascular:   Pulses:       Dorsalis pedis pulses are 1+ on the right side, and 1+ on the left side.        Posterior tibial pulses are 1+ on the right " side, and 1+ on the left side.   Dorsalis pedis and posterior tibial pulses are diminished Bilaterally. Toes are cool to touch. Feet are warm proximally.There is decreased digital hair. Skin is atrophic   Pulmonary/Chest: No respiratory distress.   Musculoskeletal:        Right ankle: No tenderness. No lateral malleolus, no medial malleolus, no AITFL, no CF ligament and no posterior TFL tenderness found. Achilles tendon exhibits no pain, no defect and normal Elias's test results.        Left ankle: No tenderness. No lateral malleolus, no medial malleolus, no AITFL, no CF ligament and no posterior TFL tenderness found. Achilles tendon exhibits no pain, no defect and normal Elias's test results.        Right foot: There is no tenderness and no bony tenderness.        Left foot: There is no tenderness and no bony tenderness.   Decreased stride, station of gait.  apropulsive toe off.  Increased angle and base of gait.    Patient has hammertoes of digits 2-5 bilateral partially reducible without symptom today.    Visible and palpable bunion without pain at dorsomedial 1st metatarsal head right and left.  Hallux abducted right and left partially reducible, tracks laterally without being track bound.  No ecchymosis, erythema, edema, or cardinal signs infection or signs of trauma same foot.    Visible and palpable tailors bunion at dorsolateral 5th metatarsal head right and left.  5th digit is varus right and left partially reducible. No ecchymosis, erythema, edema, or cardinal signs infection.    Fat pad atrophy to heels and met heads bilateral     Lymphadenopathy:   No lymphatic streaking    Negative lymphadenopathy bilateral popliteal fossa and tarsal tunnel.     Neurological:   Wisner-Tamanna 5.07 monofilamant testing is diminished Ryan feet. Decreased/absent vibratory sensation bilateral feet to 128Hz tuning fork.     Paresthesias, and hyperesthesia bilateral feet with no clearly identified trigger or  source.           Skin: Skin is warm, dry and intact. No abrasion, no burn, no ecchymosis, no laceration, no petechiae and no rash noted. He is not diaphoretic. No cyanosis. No pallor. Nails show no clubbing.   Skin is thin, atrophic, xerotic    Toenails 1-5 bilaterally are elongated by 2-3 mm, thickened by 2-3 mm, discolored/yellowed, dystrophic, brittle with subungual debris.    Focal hyperkeratotic lesion consisting entirely of hyperkeratotic tissue without open skin, drainage, pus, fluctuance, malodor, or signs of infection: sub 1st and 5th MTPJ luann   Psychiatric: His mood appears not anxious. His affect is not inappropriate. His speech is not slurred. He is not combative. He is communicative. He is attentive.   Nursing note reviewed.        Assessment:       Encounter Diagnoses   Name Primary?    Type II diabetes mellitus with neurological manifestations Yes    Onychomycosis due to dermatophyte     Corn or callus     Hammer toe, unspecified laterality     Plantar fat pad atrophy     Hallux abducto valgus, left     Hallux abducto valgus, right          Plan:       Avni was seen today for diabetes mellitus, diabetic foot exam and nail care.    Diagnoses and all orders for this visit:    Type II diabetes mellitus with neurological manifestations  -     DIABETIC SHOES FOR HOME USE    Onychomycosis due to dermatophyte    Corn or callus  -     DIABETIC SHOES FOR HOME USE    Hammer toe, unspecified laterality  -     DIABETIC SHOES FOR HOME USE    Plantar fat pad atrophy  -     DIABETIC SHOES FOR HOME USE    Hallux abducto valgus, left  -     DIABETIC SHOES FOR HOME USE    Hallux abducto valgus, right  -     DIABETIC SHOES FOR HOME USE      I counseled the patient on his conditions, their implications and medical management.    Shoe inspection. Diabetic Foot Education. Patient reminded of the importance of good nutrition and blood sugar control to help prevent podiatric complications of diabetes. Patient  instructed on proper foot hygeine. We discussed wearing proper shoe gear, daily foot inspections, never walking without protective shoe gear, never putting sharp instruments to feet.      -  Rx diabetic shoes for protection and support    - With patient's permission, nails were aggressively reduced and debrided x 10 to their soft tissue attachment mechanically and with electric , removing all offending nail and debris. Patient relates relief following the procedure.     After cleansing the  area w/ alcohol prep pad the above mentioned hyperkeratosis was trimmed utilizing No 15 scapel, to a smooth base with out incident. Patient tolerated this  well and reported comfort to the area of sub 1st and 5th MTPJ bilaterally    He will continue to monitor the areas daily, inspect her feet, wear protective shoe gear when ambulatory, moisturizer to maintain skin integrity and follow in this office in approximately  2-3 months, sooner p.r.n.

## 2017-11-20 NOTE — PATIENT INSTRUCTIONS
Recommend lotions: eucerin, aquaphor, A&D ointment, gold bond for diabetics, sween    Shoe recommendations: (try 6pm.com, zappos.com , [email or web address].com, or shoes.com for discounted prices) you can visit DSW shoes in [place] as well    Asics (GT 1000 or gel foundations), new balance, saucony (stabil c3),  Drew (transcend), vionic, propet (tennis shoe)    soft brand, clarks, crocs, aerosoles, naturalizers, SAS, ecco, rafael, ant good, rockports (dress shoes)    Vionic, volitiles, burkenstocks, fitflops, propet (sandals)    Nike comfort thong sandals, crocs (house shoes)    Nail Home remedy:  Vicks Vapor rub OR Listerine and apple cider vinegar in a spray bottle to nails    Occasional soaks for 15-20 mins in luke warm water with 1 cup of listerine and 1 cup of apple cider vinegar are ok You may add several drops of oil of oregano or tea tree oil as well      Diabetes: Inspecting Your Feet  Diabetes increases your chances of developing foot problems. So inspect your feet every day. This helps you find small skin irritations before they become serious infections. If you have trouble seeing the bottoms of your feet, use a mirror or ask a family member or friend to help.     Pressure spots on the bottom of the foot are common areas where problems develop.   How to check your feet  Below are tips to help you look for foot problems. Try to check your feet at the same time each day, such as when you get out of bed in the morning:  · Check the top of each foot. The tops of toes, back of the heel, and outer edge of the foot can get a lot of rubbing from poor-fitting shoes.  · Check the bottom of each foot. Daily wear and tear often leads to problems at pressure spots.  · Check the toes and nails. Fungal infections often occur between toes. Toenail problems can also be a sign of fungal infections or lead to breaks in the skin.  · Check your shoes, too. Loose objects inside a shoe can injure the foot. Use your hand to feel  inside your shoes for things like robert, loose stitching, or rough areas that could irritate your skin.  Warning signs  Look for any color changes in the foot. Redness with streaks can signal a severe infection, which needs immediate medical attention. Tell your doctor right away if you have any of these problems:  · Swelling, sometimes with color changes, may be a sign of poor blood flow or infection. Symptoms include tenderness and an increase in the size of your foot.  · Warm or hot areas on your feet may be signs of infection. A foot that is cold may not be getting enough blood.  · Sensations such as burning, tingling, or pins and needles can be signs of a problem. Also check for areas that may be numb.  · Hot spots are caused by friction or pressure. Look for hot spots in areas that get a lot of rubbing. Hot spots can turn into blisters, calluses, or sores.  · Cracks and sores are caused by dry or irritated skin. They are a sign that the skin is breaking down, which can lead to infection.  · Toenail problems to watch for include nails growing into the skin (ingrown toenail) and causing redness or pain. Thick, yellow, or discolored nails can signal a fungal infection.  · Drainage and odor can develop from untreated sores and ulcers. Call your doctor right away if you notice white or yellow drainage, bleeding, or unpleasant odor.   © 3532-8521 [employer]. [address] [place] [address], [place] [address]. All rights reserved. This information is not intended as a substitute for professional medical care. Always follow your healthcare professional's instructions.

## 2018-03-13 ENCOUNTER — OFFICE VISIT (OUTPATIENT)
Dept: PODIATRY | Facility: CLINIC | Age: 73
End: 2018-03-13
Payer: MEDICARE

## 2018-03-13 VITALS
WEIGHT: 150 LBS | SYSTOLIC BLOOD PRESSURE: 122 MMHG | DIASTOLIC BLOOD PRESSURE: 68 MMHG | HEIGHT: 71 IN | BODY MASS INDEX: 21 KG/M2

## 2018-03-13 DIAGNOSIS — M20.11 HALLUX ABDUCTO VALGUS, RIGHT: ICD-10-CM

## 2018-03-13 DIAGNOSIS — B35.1 ONYCHOMYCOSIS DUE TO DERMATOPHYTE: ICD-10-CM

## 2018-03-13 DIAGNOSIS — E11.49 TYPE II DIABETES MELLITUS WITH NEUROLOGICAL MANIFESTATIONS: Primary | ICD-10-CM

## 2018-03-13 DIAGNOSIS — M20.12 HALLUX ABDUCTO VALGUS, LEFT: ICD-10-CM

## 2018-03-13 DIAGNOSIS — L84 CORN OR CALLUS: ICD-10-CM

## 2018-03-13 DIAGNOSIS — M20.42 HAMMER TOES OF BOTH FEET: ICD-10-CM

## 2018-03-13 DIAGNOSIS — M20.41 HAMMER TOES OF BOTH FEET: ICD-10-CM

## 2018-03-13 DIAGNOSIS — L90.9 PLANTAR FAT PAD ATROPHY: ICD-10-CM

## 2018-03-13 PROCEDURE — 11056 PARNG/CUTG B9 HYPRKR LES 2-4: CPT | Mod: Q9,S$GLB,, | Performed by: PODIATRIST

## 2018-03-13 PROCEDURE — 99499 UNLISTED E&M SERVICE: CPT | Mod: S$GLB,,, | Performed by: PODIATRIST

## 2018-03-13 PROCEDURE — 11721 DEBRIDE NAIL 6 OR MORE: CPT | Mod: 59,Q9,S$GLB, | Performed by: PODIATRIST

## 2018-03-13 PROCEDURE — 99999 PR PBB SHADOW E&M-EST. PATIENT-LVL III: CPT | Mod: PBBFAC,,, | Performed by: PODIATRIST

## 2018-03-14 NOTE — PROGRESS NOTES
Subjective:      Patient ID: Avni Daniels is a 73 y.o. male.    Chief Complaint: Diabetes Mellitus (Pcp Dr. Gusman 01/2018); Diabetic Foot Exam; Nail Care; and Callouses    Avni is a 73 y.o. male who presents to the clinic for evaluation and treatment of high risk feet. Avni has a past medical history of Diabetes mellitus, type 2 and Hypertension. The patient's chief complaint is elongated, thickened toenails aggravated by increased weight bearing, shoe gear, pressure.    This patient has documented high risk feet requiring routine maintenance secondary to diabetes mellitis and those secondary complications of diabetes, as mentioned..    PCP: Reyes Gusman MD    Date Last Seen by PCP:   Chief Complaint   Patient presents with    Diabetes Mellitus     Pcp Dr. Gusman 01/2018    Diabetic Foot Exam    Nail Care    Callouses       Current shoe gear:   Rx diabetic extra depth shoes and custom accommodative insoles      No results found for: HGBA1C        Patient Active Problem List   Diagnosis    Diabetic ulcer of left foot associated with type 2 diabetes mellitus    Non-pressure chronic ulcer of other part of left foot with fat layer exposed       Current Outpatient Prescriptions on File Prior to Visit   Medication Sig Dispense Refill    aspirin 81 MG Chew Take 81 mg by mouth once daily.      calcitRIOL (ROCALTROL) 0.25 MCG Cap       clopidogrel (PLAVIX) 75 mg tablet       enalapril (VASOTEC) 10 MG tablet       enalapril (VASOTEC) 5 MG tablet 10 mg.       gentamicin (GARAMYCIN) 0.1 % cream       insulin NPH-insulin regular, 70/30, (NOVOLIN 70/30) 100 unit/mL (70-30) injection Inject into the skin 2 (two) times daily.      insulin syringe-needle U-100 0.5 mL 31 gauge x 5/16 Syrg       insulin syringe-needle U-100 1/2 mL 30 gauge x 5/16 Syrg       PACERONE 100 mg Tab       pravastatin (PRAVACHOL) 40 MG tablet       [DISCONTINUED] calcitRIOL (ROCALTROL) 0.5 MCG Cap        No current  "facility-administered medications on file prior to visit.        Review of patient's allergies indicates:  No Known Allergies    Past Surgical History:   Procedure Laterality Date    APPENDECTOMY      GALLBLADDER SURGERY      TUMOR REMOVAL Left     foot       History reviewed. No pertinent family history.    Social History     Social History    Marital status:      Spouse name: N/A    Number of children: N/A    Years of education: N/A     Occupational History    Not on file.     Social History Main Topics    Smoking status: Former Smoker    Smokeless tobacco: Former User    Alcohol use Not on file    Drug use: Unknown    Sexual activity: Not on file     Other Topics Concern    Not on file     Social History Narrative    No narrative on file         Review of Systems   Constitution: Negative for chills, fever and weakness.   Cardiovascular: Negative for claudication and leg swelling.   Respiratory: Negative for cough and shortness of breath.    Skin: Positive for dry skin, nail changes and poor wound healing (hx of left foot ulcer). Negative for itching and rash.   Musculoskeletal: Positive for joint pain. Negative for falls, joint swelling and muscle weakness.   Gastrointestinal: Negative for diarrhea, nausea and vomiting.   Neurological: Positive for numbness and paresthesias. Negative for tremors.   Psychiatric/Behavioral: Negative for altered mental status and hallucinations.           Objective:       Vitals:    03/13/18 0936   BP: 122/68   Weight: 68 kg (150 lb)   Height: 5' 11" (1.803 m)   PainSc: 0-No pain       Physical Exam   Constitutional:  Non-toxic appearance. He does not have a sickly appearance. No distress.   Pt. is well-developed, well-nourished, appears stated age, in no acute distress, alert and oriented x 3. No evidence of depression, anxiety, or agitation. Calm, cooperative, and communicative. Appropriate interactions and affect.   Cardiovascular:   Pulses:       Dorsalis " pedis pulses are 1+ on the right side, and 1+ on the left side.        Posterior tibial pulses are 1+ on the right side, and 1+ on the left side.   Dorsalis pedis and posterior tibial pulses are diminished Bilaterally. Toes are cool to touch. Feet are warm proximally.There is decreased digital hair. Skin is atrophic   Pulmonary/Chest: No respiratory distress.   Musculoskeletal:        Right ankle: No tenderness. No lateral malleolus, no medial malleolus, no AITFL, no CF ligament and no posterior TFL tenderness found. Achilles tendon exhibits no pain, no defect and normal Elias's test results.        Left ankle: No tenderness. No lateral malleolus, no medial malleolus, no AITFL, no CF ligament and no posterior TFL tenderness found. Achilles tendon exhibits no pain, no defect and normal Elias's test results.        Right foot: There is no tenderness and no bony tenderness.        Left foot: There is no tenderness and no bony tenderness.   Decreased stride, station of gait.  apropulsive toe off.  Increased angle and base of gait.    Patient has hammertoes of digits 2-5 bilateral partially reducible without symptom today.    Visible and palpable bunion without pain at dorsomedial 1st metatarsal head right and left.  Hallux abducted right and left partially reducible, tracks laterally without being track bound.  No ecchymosis, erythema, edema, or cardinal signs infection or signs of trauma same foot.    Visible and palpable tailors bunion at dorsolateral 5th metatarsal head right and left.  5th digit is varus right and left partially reducible. No ecchymosis, erythema, edema, or cardinal signs infection.    Fat pad atrophy to heels and met heads bilateral     Lymphadenopathy:   No lymphatic streaking    Negative lymphadenopathy bilateral popliteal fossa and tarsal tunnel.     Neurological:   Watkins-Tamanna 5.07 monofilamant testing is diminished Ryan feet. Decreased/absent vibratory sensation bilateral feet to  128Hz tuning fork.     Paresthesias, and hyperesthesia bilateral feet with no clearly identified trigger or source.           Skin: Skin is warm, dry and intact. No abrasion, no burn, no ecchymosis, no laceration, no petechiae and no rash noted. He is not diaphoretic. No cyanosis. No pallor. Nails show no clubbing.   Skin is thin, atrophic, xerotic    Toenails 1-5 bilaterally are elongated by 2-3 mm, thickened by 2-3 mm, discolored/yellowed, dystrophic, brittle with subungual debris.    Focal hyperkeratotic lesion consisting entirely of hyperkeratotic tissue without open skin, drainage, pus, fluctuance, malodor, or signs of infection: sub 1st and 5th MTPJ luann   Psychiatric: His mood appears not anxious. His affect is not inappropriate. His speech is not slurred. He is not combative. He is communicative. He is attentive.   Nursing note reviewed.        Assessment:       Encounter Diagnoses   Name Primary?    Type II diabetes mellitus with neurological manifestations Yes    Hammer toes of both feet     Hallux abducto valgus, left     Hallux abducto valgus, right     Plantar fat pad atrophy     Onychomycosis due to dermatophyte     Corn or callus          Plan:       Avni was seen today for diabetes mellitus, diabetic foot exam, nail care and callouses.    Diagnoses and all orders for this visit:    Type II diabetes mellitus with neurological manifestations    Hammer toes of both feet    Hallux abducto valgus, left    Hallux abducto valgus, right    Plantar fat pad atrophy    Onychomycosis due to dermatophyte    Corn or callus      I counseled the patient on his conditions, their implications and medical management.    Shoe inspection. Diabetic Foot Education. Patient reminded of the importance of good nutrition and blood sugar control to help prevent podiatric complications of diabetes. Patient instructed on proper foot hygeine. We discussed wearing proper shoe gear, daily foot inspections, never walking  without protective shoe gear, never putting sharp instruments to feet.      - With patient's permission, nails were aggressively reduced and debrided x 10 to their soft tissue attachment mechanically and with electric , removing all offending nail and debris. Patient relates relief following the procedure.     After cleansing the  area w/ alcohol prep pad the above mentioned hyperkeratosis was trimmed utilizing No 15 scapel, to a smooth base with out incident. Patient tolerated this  well and reported comfort to the area of sub 1st and 5th MTPJ bilaterally    He will continue to monitor the areas daily, inspect her feet, wear protective shoe gear when ambulatory, moisturizer to maintain skin integrity and follow in this office in approximately  2-3 months, sooner p.r.n.

## 2018-06-12 ENCOUNTER — OFFICE VISIT (OUTPATIENT)
Dept: PODIATRY | Facility: CLINIC | Age: 73
End: 2018-06-12
Payer: MEDICARE

## 2018-06-12 VITALS
BODY MASS INDEX: 20.99 KG/M2 | WEIGHT: 149.94 LBS | DIASTOLIC BLOOD PRESSURE: 58 MMHG | HEIGHT: 71 IN | SYSTOLIC BLOOD PRESSURE: 110 MMHG

## 2018-06-12 DIAGNOSIS — M20.11 HALLUX ABDUCTO VALGUS, RIGHT: ICD-10-CM

## 2018-06-12 DIAGNOSIS — L84 CORN OR CALLUS: ICD-10-CM

## 2018-06-12 DIAGNOSIS — M20.41 HAMMER TOES OF BOTH FEET: ICD-10-CM

## 2018-06-12 DIAGNOSIS — B35.1 ONYCHOMYCOSIS DUE TO DERMATOPHYTE: ICD-10-CM

## 2018-06-12 DIAGNOSIS — M20.12 HALLUX ABDUCTO VALGUS, LEFT: ICD-10-CM

## 2018-06-12 DIAGNOSIS — L90.9 PLANTAR FAT PAD ATROPHY: ICD-10-CM

## 2018-06-12 DIAGNOSIS — M20.42 HAMMER TOES OF BOTH FEET: ICD-10-CM

## 2018-06-12 DIAGNOSIS — E11.49 TYPE II DIABETES MELLITUS WITH NEUROLOGICAL MANIFESTATIONS: Primary | ICD-10-CM

## 2018-06-12 PROCEDURE — 99999 PR PBB SHADOW E&M-EST. PATIENT-LVL III: CPT | Mod: PBBFAC,,, | Performed by: PODIATRIST

## 2018-06-12 PROCEDURE — 11056 PARNG/CUTG B9 HYPRKR LES 2-4: CPT | Mod: Q9,S$GLB,, | Performed by: PODIATRIST

## 2018-06-12 PROCEDURE — 11721 DEBRIDE NAIL 6 OR MORE: CPT | Mod: 59,Q9,S$GLB, | Performed by: PODIATRIST

## 2018-06-12 PROCEDURE — 99499 UNLISTED E&M SERVICE: CPT | Mod: S$GLB,,, | Performed by: PODIATRIST

## 2018-06-12 NOTE — PROGRESS NOTES
Subjective:      Patient ID: Avni Daniels is a 73 y.o. male.    Chief Complaint: Diabetes Mellitus (pcp Naseem 06-07-18); Diabetic Foot Exam; and Nail Care    Avni is a 73 y.o. male who presents to the clinic for evaluation and treatment of high risk feet. Avni has a past medical history of Diabetes mellitus, type 2 and Hypertension. The patient's chief complaint is elongated, thickened toenails aggravated by increased weight bearing, shoe gear, pressure. Patient attempted self nail care and is actively bleeding to the left hallux   This patient has documented high risk feet requiring routine maintenance secondary to diabetes mellitis and those secondary complications of diabetes, as mentioned..    PCP: Reyes Gusman MD    Date Last Seen by PCP:   Chief Complaint   Patient presents with    Diabetes Mellitus     pcp Naseem 06-07-18    Diabetic Foot Exam    Nail Care       Current shoe gear:   Rx diabetic extra depth shoes and custom accommodative insoles      No results found for: HGBA1C        Patient Active Problem List   Diagnosis    Diabetic ulcer of left foot associated with type 2 diabetes mellitus    Non-pressure chronic ulcer of other part of left foot with fat layer exposed       Current Outpatient Prescriptions on File Prior to Visit   Medication Sig Dispense Refill    aspirin 81 MG Chew Take 81 mg by mouth once daily.      clopidogrel (PLAVIX) 75 mg tablet Take 75 mg by mouth once.       enalapril (VASOTEC) 5 MG tablet Take 5 mg by mouth once daily.       insulin NPH-insulin regular, 70/30, (NOVOLIN 70/30) 100 unit/mL (70-30) injection Inject 25 Units into the skin 2 (two) times daily.       insulin syringe-needle U-100 0.5 mL 31 gauge x 5/16 Syrg       insulin syringe-needle U-100 1/2 mL 30 gauge x 5/16 Syrg       PACERONE 100 mg Tab Take 100 mg by mouth once daily.       pravastatin (PRAVACHOL) 40 MG tablet Take 40 mg by mouth once daily.       [DISCONTINUED] calcitRIOL (ROCALTROL) 0.25  "MCG Cap       [DISCONTINUED] enalapril (VASOTEC) 10 MG tablet       [DISCONTINUED] gentamicin (GARAMYCIN) 0.1 % cream        No current facility-administered medications on file prior to visit.        Review of patient's allergies indicates:  No Known Allergies    Past Surgical History:   Procedure Laterality Date    APPENDECTOMY      GALLBLADDER SURGERY      TUMOR REMOVAL Left     foot       History reviewed. No pertinent family history.    Social History     Social History    Marital status:      Spouse name: N/A    Number of children: N/A    Years of education: N/A     Occupational History    Not on file.     Social History Main Topics    Smoking status: Former Smoker    Smokeless tobacco: Former User    Alcohol use Not on file    Drug use: Unknown    Sexual activity: Not on file     Other Topics Concern    Not on file     Social History Narrative    No narrative on file         Review of Systems   Constitution: Negative for chills, fever and weakness.   Cardiovascular: Negative for claudication and leg swelling.   Respiratory: Negative for cough and shortness of breath.    Skin: Positive for dry skin, nail changes and poor wound healing (hx of left foot ulcer). Negative for itching and rash.   Musculoskeletal: Positive for joint pain. Negative for falls, joint swelling and muscle weakness.   Gastrointestinal: Negative for diarrhea, nausea and vomiting.   Neurological: Positive for numbness and paresthesias. Negative for tremors.   Psychiatric/Behavioral: Negative for altered mental status and hallucinations.           Objective:       Vitals:    06/12/18 1011   BP: (!) 110/58   Weight: 68 kg (149 lb 14.6 oz)   Height: 5' 11" (1.803 m)   PainSc: 0-No pain       Physical Exam   Constitutional:  Non-toxic appearance. He does not have a sickly appearance. No distress.   Pt. is well-developed, well-nourished, appears stated age, in no acute distress, alert and oriented x 3. No evidence of " depression, anxiety, or agitation. Calm, cooperative, and communicative. Appropriate interactions and affect.   Cardiovascular:   Pulses:       Dorsalis pedis pulses are 1+ on the right side, and 1+ on the left side.        Posterior tibial pulses are 1+ on the right side, and 1+ on the left side.   Dorsalis pedis and posterior tibial pulses are diminished Bilaterally. Toes are cool to touch. Feet are warm proximally.There is decreased digital hair. Skin is atrophic   Pulmonary/Chest: No respiratory distress.   Musculoskeletal:        Right ankle: No tenderness. No lateral malleolus, no medial malleolus, no AITFL, no CF ligament and no posterior TFL tenderness found. Achilles tendon exhibits no pain, no defect and normal Elias's test results.        Left ankle: No tenderness. No lateral malleolus, no medial malleolus, no AITFL, no CF ligament and no posterior TFL tenderness found. Achilles tendon exhibits no pain, no defect and normal Elias's test results.        Right foot: There is no tenderness and no bony tenderness.        Left foot: There is no tenderness and no bony tenderness.   Decreased stride, station of gait.  apropulsive toe off.  Increased angle and base of gait.    Patient has hammertoes of digits 2-5 bilateral partially reducible without symptom today.    Visible and palpable bunion without pain at dorsomedial 1st metatarsal head right and left.  Hallux abducted right and left partially reducible, tracks laterally without being track bound.  No ecchymosis, erythema, edema, or cardinal signs infection or signs of trauma same foot.    Visible and palpable tailors bunion at dorsolateral 5th metatarsal head right and left.  5th digit is varus right and left partially reducible. No ecchymosis, erythema, edema, or cardinal signs infection.    Fat pad atrophy to heels and met heads bilateral     Lymphadenopathy:   No lymphatic streaking    Negative lymphadenopathy bilateral popliteal fossa and tarsal  tunnel.     Neurological:   [place]-Tamanna 5.07 monofilamant testing is diminished Ryan feet. Decreased/absent vibratory sensation bilateral feet to 128Hz tuning fork.     Paresthesias, and hyperesthesia bilateral feet with no clearly identified trigger or source.           Skin: Skin is warm and dry. Abrasion (self inflicted to medial left hallux nail border) noted. No burn, no ecchymosis, no laceration, no petechiae and no rash noted. He is not diaphoretic. No cyanosis. No pallor. Nails show no clubbing.   Skin is thin, atrophic, xerotic    Toenails 1-5 bilaterally are elongated by 2-3 mm, thickened by 2-3 mm, discolored/yellowed, dystrophic, brittle with subungual debris.    Focal hyperkeratotic lesion consisting entirely of hyperkeratotic tissue without open skin, drainage, pus, fluctuance, malodor, or signs of infection: sub 1st and 5th MTPJ ryan   Psychiatric: His mood appears not anxious. His affect is not inappropriate. His speech is not slurred. He is not combative. He is communicative. He is attentive.   Nursing note reviewed.        Assessment:       Encounter Diagnoses   Name Primary?    Type II diabetes mellitus with neurological manifestations Yes    Hammer toes of both feet     Hallux abducto valgus, left     Hallux abducto valgus, right     Plantar fat pad atrophy     Onychomycosis due to dermatophyte     Corn or callus          Plan:       Avni was seen today for diabetes mellitus, diabetic foot exam and nail care.    Diagnoses and all orders for this visit:    Type II diabetes mellitus with neurological manifestations    Hammer toes of both feet    Hallux abducto valgus, left    Hallux abducto valgus, right    Plantar fat pad atrophy    Onychomycosis due to dermatophyte    Corn or callus      I counseled the patient on his conditions, their implications and medical management.    Shoe inspection. Diabetic Foot Education. Patient reminded of the importance of good nutrition and blood sugar  control to help prevent podiatric complications of diabetes. Patient instructed on proper foot hygeine. We discussed wearing proper shoe gear, daily foot inspections, never walking without protective shoe gear, never putting sharp instruments to feet.      - With patient's permission, nails were aggressively reduced and debrided x 10 to their soft tissue attachment mechanically and with electric , removing all offending nail and debris. Patient relates relief following the procedure.     After cleansing the  area w/ alcohol prep pad the above mentioned hyperkeratosis was trimmed utilizing No 15 scapel, to a smooth base with out incident. Patient tolerated this  well and reported comfort to the area of sub 1st and 5th MTPJ bilaterally    Silver nitrate to left hallux nail border where patient clipped his skin    He will continue to monitor the areas daily, inspect her feet, wear protective shoe gear when ambulatory, moisturizer to maintain skin integrity and follow in this office in approximately  2-3 months, sooner p.r.n.

## 2018-10-08 ENCOUNTER — OFFICE VISIT (OUTPATIENT)
Dept: PODIATRY | Facility: CLINIC | Age: 73
End: 2018-10-08
Payer: MEDICARE

## 2018-10-08 VITALS
BODY MASS INDEX: 20.86 KG/M2 | WEIGHT: 149 LBS | HEIGHT: 71 IN | DIASTOLIC BLOOD PRESSURE: 59 MMHG | SYSTOLIC BLOOD PRESSURE: 123 MMHG | HEART RATE: 88 BPM

## 2018-10-08 DIAGNOSIS — M20.41 HAMMER TOES OF BOTH FEET: ICD-10-CM

## 2018-10-08 DIAGNOSIS — M20.12 HALLUX ABDUCTO VALGUS, LEFT: ICD-10-CM

## 2018-10-08 DIAGNOSIS — M20.42 HAMMER TOES OF BOTH FEET: ICD-10-CM

## 2018-10-08 DIAGNOSIS — B35.1 ONYCHOMYCOSIS DUE TO DERMATOPHYTE: ICD-10-CM

## 2018-10-08 DIAGNOSIS — M20.11 HALLUX ABDUCTO VALGUS, RIGHT: ICD-10-CM

## 2018-10-08 DIAGNOSIS — L84 CORN OR CALLUS: ICD-10-CM

## 2018-10-08 DIAGNOSIS — E11.49 TYPE II DIABETES MELLITUS WITH NEUROLOGICAL MANIFESTATIONS: Primary | ICD-10-CM

## 2018-10-08 DIAGNOSIS — L90.9 PLANTAR FAT PAD ATROPHY: ICD-10-CM

## 2018-10-08 PROCEDURE — 11056 PARNG/CUTG B9 HYPRKR LES 2-4: CPT | Mod: Q9,S$PBB,, | Performed by: PODIATRIST

## 2018-10-08 PROCEDURE — 11721 DEBRIDE NAIL 6 OR MORE: CPT | Mod: 59,Q9,S$PBB, | Performed by: PODIATRIST

## 2018-10-08 PROCEDURE — 11721 DEBRIDE NAIL 6 OR MORE: CPT | Mod: Q9,PBBFAC,PO,59 | Performed by: PODIATRIST

## 2018-10-08 PROCEDURE — 99499 UNLISTED E&M SERVICE: CPT | Mod: S$PBB,,, | Performed by: PODIATRIST

## 2018-10-08 PROCEDURE — 99213 OFFICE O/P EST LOW 20 MIN: CPT | Mod: PBBFAC,PO,25 | Performed by: PODIATRIST

## 2018-10-08 PROCEDURE — 11056 PARNG/CUTG B9 HYPRKR LES 2-4: CPT | Mod: Q9,PBBFAC,PO | Performed by: PODIATRIST

## 2018-10-08 PROCEDURE — 99999 PR PBB SHADOW E&M-EST. PATIENT-LVL III: CPT | Mod: PBBFAC,,, | Performed by: PODIATRIST

## 2018-10-08 RX ORDER — ATORVASTATIN CALCIUM 40 MG/1
TABLET, FILM COATED ORAL
COMMUNITY
Start: 2018-09-06

## 2018-10-08 NOTE — PATIENT INSTRUCTIONS
Recommend lotions: eucerin, eucerin for diabetics, aquaphor, A&D ointment, gold bond for diabetics, sween, [place] all purpose baby ointment,  urea 40 with aloe (found on amazon.com)    Shoe recommendations: (try 6pm.com, zappos.com , nordstromrack.EasyRun, or shoes.EasyRun for discounted prices) you can visit [employer]SW shoes in [place]  or [employer] [facility] in the [facility] (there are also several shoe brand outlets in the [facility])    Asics (GT 2000 or gel foundations), new balance stability type shoes, saucony (stabil c3),  Drew (GTS or Beast or transcend), vionic, propet (tennis shoe)    sofft brand, clarks, crocs, aerosoles, naturalizers, SAS, ecco, born, ant good, rockports (dress shoes)    Vionic, burkenstocks, fitflops, propet (sandals)  Nike comfort thong sandals, crocs, propet (house shoes)    Nail Home remedy:  Vicks Vapor rub to nails for easier managability    Occasional soaks for 15-20 mins in luke warm water with 1 cup of listerine and 1 cup of apple cider vinegar are ok You may add several drops of oil of oregano or tea tree oil as well        Diabetes: Inspecting Your Feet  Diabetes increases your chances of developing foot problems. So inspect your feet every day. This helps you find small skin irritations before they become serious infections. If you have trouble seeing the bottoms of your feet, use a mirror or ask a family member or friend to help.     Pressure spots on the bottom of the foot are common areas where problems develop.   How to check your feet  Below are tips to help you look for foot problems. Try to check your feet at the same time each day, such as when you get out of bed in the morning:  · Check the top of each foot. The tops of toes, back of the heel, and outer edge of the foot can get a lot of rubbing from poor-fitting shoes.  · Check the bottom of each foot. Daily wear and tear often leads to problems at pressure spots.  · Check the toes and nails. Fungal infections often occur  between toes. Toenail problems can also be a sign of fungal infections or lead to breaks in the skin.  · Check your shoes, too. Loose objects inside a shoe can injure the foot. Use your hand to feel inside your shoes for things like robert, loose stitching, or rough areas that could irritate your skin.  Warning signs  Look for any color changes in the foot. Redness with streaks can signal a severe infection, which needs immediate medical attention. Tell your doctor right away if you have any of these problems:  · Swelling, sometimes with color changes, may be a sign of poor blood flow or infection. Symptoms include tenderness and an increase in the size of your foot.  · Warm or hot areas on your feet may be signs of infection. A foot that is cold may not be getting enough blood.  · Sensations such as burning, tingling, or pins and needles can be signs of a problem. Also check for areas that may be numb.  · Hot spots are caused by friction or pressure. Look for hot spots in areas that get a lot of rubbing. Hot spots can turn into blisters, calluses, or sores.  · Cracks and sores are caused by dry or irritated skin. They are a sign that the skin is breaking down, which can lead to infection.  · Toenail problems to watch for include nails growing into the skin (ingrown toenail) and causing redness or pain. Thick, yellow, or discolored nails can signal a fungal infection.  · Drainage and odor can develop from untreated sores and ulcers. Call your doctor right away if you notice white or yellow drainage, bleeding, or unpleasant odor.   © 0746-7772 [employer]. [address] [place] [id number]. All rights reserved. This information is not intended as a substitute for professional medical care. Always follow your healthcare professional's instructions.        Step-by-Step:  Inspecting Your Feet (Diabetes)    Date Last Reviewed: 10/1/2016  © 2696-6349 [employer]. [address] [place] [address]  Road, [place] [address]. All rights reserved. This information is not intended as a substitute for professional medical care. Always follow your healthcare professional's instructions.

## 2018-10-08 NOTE — PROGRESS NOTES
Subjective:      Patient ID: Avni Daniels is a 73 y.o. male.    Chief Complaint: Diabetic Foot Exam (Pcp DR. Gusman sees him  10/15/18); Diabetes Mellitus; and Nail Care    Avni is a 73 y.o. male who presents to the clinic for evaluation and treatment of high risk feet. Avni has a past medical history of Diabetes mellitus, type 2 and Hypertension. The patient's chief complaint is elongated, thickened toenails aggravated by increased weight bearing, shoe gear, pressure.  This patient has documented high risk feet requiring routine maintenance secondary to diabetes mellitis and those secondary complications of diabetes, as mentioned..    PCP: Reyes Gusman MD    Date Last Seen by PCP:   Chief Complaint   Patient presents with    Diabetic Foot Exam     Pcp DR. Gusman sees him  10/15/18    Diabetes Mellitus    Nail Care       Current shoe gear:   Rx diabetic extra depth shoes and custom accommodative insoles      No results found for: HGBA1C    Patient Active Problem List   Diagnosis    Diabetic ulcer of left foot associated with type 2 diabetes mellitus    Non-pressure chronic ulcer of other part of left foot with fat layer exposed       Current Outpatient Medications on File Prior to Visit   Medication Sig Dispense Refill    aspirin 81 MG Chew Take 81 mg by mouth once daily.      clopidogrel (PLAVIX) 75 mg tablet Take 75 mg by mouth once.       enalapril (VASOTEC) 5 MG tablet Take 5 mg by mouth once daily.       insulin NPH-insulin regular, 70/30, (NOVOLIN 70/30) 100 unit/mL (70-30) injection Inject 25 Units into the skin 2 (two) times daily.       insulin syringe-needle U-100 0.5 mL 31 gauge x 5/16 Syrg       insulin syringe-needle U-100 1/2 mL 30 gauge x 5/16 Syrg       PACERONE 100 mg Tab Take 100 mg by mouth once daily.       pravastatin (PRAVACHOL) 40 MG tablet Take 40 mg by mouth once daily.       atorvastatin (LIPITOR) 40 MG tablet        No current facility-administered medications on file  "prior to visit.        Review of patient's allergies indicates:  No Known Allergies    Past Surgical History:   Procedure Laterality Date    APPENDECTOMY      GALLBLADDER SURGERY      TUMOR REMOVAL Left     foot       History reviewed. No pertinent family history.    Social History     Socioeconomic History    Marital status:      Spouse name: Not on file    Number of children: Not on file    Years of education: Not on file    Highest education level: Not on file   Social Needs    Financial resource strain: Not on file    Food insecurity - worry: Not on file    Food insecurity - inability: Not on file    Transportation needs - medical: Not on file    Transportation needs - non-medical: Not on file   Occupational History    Not on file   Tobacco Use    Smoking status: Former Smoker    Smokeless tobacco: Former User   Substance and Sexual Activity    Alcohol use: Not on file    Drug use: Not on file    Sexual activity: Not on file   Other Topics Concern    Not on file   Social History Narrative    Not on file         Review of Systems   Constitution: Negative for chills, fever and weakness.   Cardiovascular: Negative for claudication and leg swelling.   Respiratory: Negative for cough and shortness of breath.    Skin: Positive for dry skin, nail changes and poor wound healing (hx of left foot ulcer). Negative for itching and rash.   Musculoskeletal: Positive for joint pain. Negative for falls, joint swelling and muscle weakness.   Gastrointestinal: Negative for diarrhea, nausea and vomiting.   Neurological: Positive for numbness and paresthesias. Negative for tremors.   Psychiatric/Behavioral: Negative for altered mental status and hallucinations.           Objective:       Vitals:    10/08/18 0906   BP: (!) 123/59   Pulse: 88   Weight: 67.6 kg (149 lb)   Height: 5' 11" (1.803 m)   PainSc: 0-No pain       Physical Exam   Constitutional:  Non-toxic appearance. He does not have a sickly " appearance. No distress.   Pt. is well-developed, well-nourished, appears stated age, in no acute distress, alert and oriented x 3. No evidence of depression, anxiety, or agitation. Calm, cooperative, and communicative. Appropriate interactions and affect.   Cardiovascular:   Pulses:       Dorsalis pedis pulses are 1+ on the right side, and 1+ on the left side.        Posterior tibial pulses are 1+ on the right side, and 1+ on the left side.   Dorsalis pedis and posterior tibial pulses are diminished Bilaterally. Toes are cool to touch. Feet are warm proximally.There is decreased digital hair. Skin is atrophic   Pulmonary/Chest: No respiratory distress.   Musculoskeletal:        Right ankle: No tenderness. No lateral malleolus, no medial malleolus, no AITFL, no CF ligament and no posterior TFL tenderness found. Achilles tendon exhibits no pain, no defect and normal Elias's test results.        Left ankle: No tenderness. No lateral malleolus, no medial malleolus, no AITFL, no CF ligament and no posterior TFL tenderness found. Achilles tendon exhibits no pain, no defect and normal Elias's test results.        Right foot: There is no tenderness and no bony tenderness.        Left foot: There is no tenderness and no bony tenderness.   Decreased stride, station of gait.  apropulsive toe off.  Increased angle and base of gait.    Patient has hammertoes of digits 2-5 bilateral partially reducible without symptom today.    Visible and palpable bunion without pain at dorsomedial 1st metatarsal head right and left.  Hallux abducted right and left partially reducible, tracks laterally without being track bound.  No ecchymosis, erythema, edema, or cardinal signs infection or signs of trauma same foot.    Visible and palpable tailors bunion at dorsolateral 5th metatarsal head right and left.  5th digit is varus right and left partially reducible. No ecchymosis, erythema, edema, or cardinal signs infection.    Fat pad  atrophy to heels and met heads bilateral     Lymphadenopathy:   No lymphatic streaking    Negative lymphadenopathy bilateral popliteal fossa and tarsal tunnel.     Neurological:   [place]-Tamanna 5.07 monofilamant testing is diminished Ryan feet. Decreased/absent vibratory sensation bilateral feet to 128Hz tuning fork.     Paresthesias, and hyperesthesia bilateral feet with no clearly identified trigger or source.           Skin: Skin is warm and dry. No abrasion, no burn, no ecchymosis, no laceration, no petechiae and no rash noted. He is not diaphoretic. No cyanosis. No pallor. Nails show no clubbing.   Skin is thin, atrophic, xerotic    Toenails 1-5 bilaterally are elongated by 2-3 mm, thickened by 2-3 mm, discolored/yellowed, dystrophic, brittle with subungual debris.    Focal hyperkeratotic lesion consisting entirely of hyperkeratotic tissue without open skin, drainage, pus, fluctuance, malodor, or signs of infection: sub 1st and 5th MTPJ ryan   Psychiatric: His mood appears not anxious. His affect is not inappropriate. His speech is not slurred. He is not combative. He is communicative. He is attentive.   Nursing note reviewed.        Assessment:       Encounter Diagnoses   Name Primary?    Type II diabetes mellitus with neurological manifestations Yes    Hammer toes of both feet     Hallux abducto valgus, left     Hallux abducto valgus, right     Plantar fat pad atrophy     Onychomycosis due to dermatophyte     Corn or callus          Plan:       Avni was seen today for diabetic foot exam, diabetes mellitus and nail care.    Diagnoses and all orders for this visit:    Type II diabetes mellitus with neurological manifestations    Hammer toes of both feet    Hallux abducto valgus, left    Hallux abducto valgus, right    Plantar fat pad atrophy    Onychomycosis due to dermatophyte    Corn or callus      I counseled the patient on his conditions, their implications and medical management.    Shoe  inspection. Diabetic Foot Education. Patient reminded of the importance of good nutrition and blood sugar control to help prevent podiatric complications of diabetes. Patient instructed on proper foot hygeine. We discussed wearing proper shoe gear, daily foot inspections, never walking without protective shoe gear, never putting sharp instruments to feet.      - With patient's permission, nails were aggressively reduced and debrided x 10 to their soft tissue attachment mechanically and with electric , removing all offending nail and debris. Patient relates relief following the procedure.     After cleansing the  area w/ alcohol prep pad the above mentioned hyperkeratosis was trimmed utilizing No 15 scapel, to a smooth base with out incident. Patient tolerated this  well and reported comfort to the area of sub 1st and 5th MTPJ bilaterally    He will continue to monitor the areas daily, inspect her feet, wear protective shoe gear when ambulatory, moisturizer to maintain skin integrity and follow in this office in approximately  2-3 months, sooner p.r.n.

## 2019-01-08 ENCOUNTER — OFFICE VISIT (OUTPATIENT)
Dept: PODIATRY | Facility: CLINIC | Age: 74
End: 2019-01-08
Payer: MEDICARE

## 2019-01-08 VITALS — BODY MASS INDEX: 20.86 KG/M2 | WEIGHT: 149 LBS | HEIGHT: 71 IN

## 2019-01-08 DIAGNOSIS — L90.9 PLANTAR FAT PAD ATROPHY: ICD-10-CM

## 2019-01-08 DIAGNOSIS — M20.12 HALLUX ABDUCTO VALGUS, LEFT: ICD-10-CM

## 2019-01-08 DIAGNOSIS — L84 CORN OR CALLUS: ICD-10-CM

## 2019-01-08 DIAGNOSIS — B35.1 ONYCHOMYCOSIS DUE TO DERMATOPHYTE: ICD-10-CM

## 2019-01-08 DIAGNOSIS — M20.42 HAMMER TOES OF BOTH FEET: ICD-10-CM

## 2019-01-08 DIAGNOSIS — M20.41 HAMMER TOES OF BOTH FEET: ICD-10-CM

## 2019-01-08 DIAGNOSIS — M20.11 HALLUX ABDUCTO VALGUS, RIGHT: ICD-10-CM

## 2019-01-08 DIAGNOSIS — E11.49 TYPE II DIABETES MELLITUS WITH NEUROLOGICAL MANIFESTATIONS: Primary | ICD-10-CM

## 2019-01-08 PROCEDURE — 99999 PR PBB SHADOW E&M-EST. PATIENT-LVL III: ICD-10-PCS | Mod: PBBFAC,,, | Performed by: PODIATRIST

## 2019-01-08 PROCEDURE — 11056 PR TRIM BENIGN HYPERKERATOTIC SKIN LESION,2-4: ICD-10-PCS | Mod: Q9,S$GLB,, | Performed by: PODIATRIST

## 2019-01-08 PROCEDURE — 99499 UNLISTED E&M SERVICE: CPT | Mod: S$GLB,,, | Performed by: PODIATRIST

## 2019-01-08 PROCEDURE — 11056 PARNG/CUTG B9 HYPRKR LES 2-4: CPT | Mod: Q9,S$GLB,, | Performed by: PODIATRIST

## 2019-01-08 PROCEDURE — 99999 PR PBB SHADOW E&M-EST. PATIENT-LVL III: CPT | Mod: PBBFAC,,, | Performed by: PODIATRIST

## 2019-01-08 PROCEDURE — 11721 DEBRIDE NAIL 6 OR MORE: CPT | Mod: Q9,59,S$GLB, | Performed by: PODIATRIST

## 2019-01-08 PROCEDURE — 99499 NO LOS: ICD-10-PCS | Mod: S$GLB,,, | Performed by: PODIATRIST

## 2019-01-08 PROCEDURE — 11721 PR DEBRIDEMENT OF NAILS, 6 OR MORE: ICD-10-PCS | Mod: Q9,59,S$GLB, | Performed by: PODIATRIST

## 2019-01-08 NOTE — PATIENT INSTRUCTIONS
Recommend lotions: eucerin, eucerin for diabetics, aquaphor, A&D ointment, gold bond for diabetics, sween, Tracy's Bees all purpose baby ointment,  urea 40 with aloe (found on amazon.com)    Shoe recommendations: (try 6pm.com, zappos.C[employer] , nordstromrack.Clicks [employer] a Cause, or shoes.[employer] for discounted prices) you can visit DSW shoes in [place]  or [place] in the [facility] (there are also several shoe brand outlets in the [facility])    Asics (GT 2000 or gel foundations), new balance stability type shoes, saucony (stabil c3),  Drew (GTS or Beast or transcend), propet (tennis shoe)    Sofdani Knox (women) Donna&Mich (men), clarks, crocs, aerosoles, naturalizers, SAS, ecco, born, ant good, rockports (dress shoes)    Vionic, burkenstocks, fitflops, propet (sandals)  Nike comfort thong sandals, crocs, propet (house shoes)    Nail Home remedy:  Vicks Vapor rub to nails for easier manageability    Occasional soaks for 15-20 mins in luke warm water with 1/2 cup of listerine and 1 cup of apple cider vinegar are ok You may add several drops of oil of oregano or tea tree oil as well      Diabetes: Inspecting Your Feet  Diabetes increases your chances of developing foot problems. So inspect your feet every day. This helps you find small skin irritations before they become serious infections. If you have trouble seeing the bottoms of your feet, use a mirror or ask a family member or friend to help.     Pressure spots on the bottom of the foot are common areas where problems develop.   How to check your feet  Below are tips to help you look for foot problems. Try to check your feet at the same time each day, such as when you get out of bed in the morning:  · Check the top of each foot. The tops of toes, back of the heel, and outer edge of the foot can get a lot of rubbing from poor-fitting shoes.  · Check the bottom of each foot. Daily wear and tear often leads to problems at pressure spots.  · Check the toes and nails. Fungal  infections often occur between toes. Toenail problems can also be a sign of fungal infections or lead to breaks in the skin.  · Check your shoes, too. Loose objects inside a shoe can injure the foot. Use your hand to feel inside your shoes for things like robert, loose stitching, or rough areas that could irritate your skin.  Warning signs  Look for any color changes in the foot. Redness with streaks can signal a severe infection, which needs immediate medical attention. Tell your doctor right away if you have any of these problems:  · Swelling, sometimes with color changes, may be a sign of poor blood flow or infection. Symptoms include tenderness and an increase in the size of your foot.  · Warm or hot areas on your feet may be signs of infection. A foot that is cold may not be getting enough blood.  · Sensations such as burning, tingling, or pins and needles can be signs of a problem. Also check for areas that may be numb.  · Hot spots are caused by friction or pressure. Look for hot spots in areas that get a lot of rubbing. Hot spots can turn into blisters, calluses, or sores.  · Cracks and sores are caused by dry or irritated skin. They are a sign that the skin is breaking down, which can lead to infection.  · Toenail problems to watch for include nails growing into the skin (ingrown toenail) and causing redness or pain. Thick, yellow, or discolored nails can signal a fungal infection.  · Drainage and odor can develop from untreated sores and ulcers. Call your doctor right away if you notice white or yellow drainage, bleeding, or unpleasant odor.   © 7444-6139 [employer]. [address] [place] [address]. All rights reserved. This information is not intended as a substitute for professional medical care. Always follow your healthcare professional's instructions.        Step-by-Step:  Inspecting Your Feet (Diabetes)    Date Last Reviewed: 10/1/2016  © 1896-7221 [employer] [address] [place] [address]. All rights reserved. This information is not intended as a substitute for professional medical care. Always follow your healthcare professional's instructions.

## 2019-01-10 NOTE — PROGRESS NOTES
Subjective:      Patient ID: Avni Daniels is a 74 y.o. male.    Chief Complaint: Diabetes Mellitus (painful callouses Pcp Dr. Gusman 11/2018 ); Diabetic Foot Exam; and Nail Care    Avni is a 74 y.o. male who presents to the clinic for evaluation and treatment of high risk feet. Avni has a past medical history of Diabetes mellitus, type 2 and Hypertension. The patient's chief complaint is elongated, thickened toenails aggravated by increased weight bearing, shoe gear, pressure.  This patient has documented high risk feet requiring routine maintenance secondary to diabetes mellitis and those secondary complications of diabetes, as mentioned..    PCP: eRyes Gusman MD    Date Last Seen by PCP:   Chief Complaint   Patient presents with    Diabetes Mellitus     painful callouses Pcp Dr. Gusman 11/2018     Diabetic Foot Exam    Nail Care       Current shoe gear:   Rx diabetic extra depth shoes and custom accommodative insoles      No results found for: HGBA1C        Patient Active Problem List   Diagnosis    Diabetic ulcer of left foot associated with type 2 diabetes mellitus    Non-pressure chronic ulcer of other part of left foot with fat layer exposed       Current Outpatient Medications on File Prior to Visit   Medication Sig Dispense Refill    aspirin 81 MG Chew Take 81 mg by mouth once daily.      atorvastatin (LIPITOR) 40 MG tablet       clopidogrel (PLAVIX) 75 mg tablet Take 75 mg by mouth once.       enalapril (VASOTEC) 5 MG tablet Take 5 mg by mouth once daily.       insulin NPH-insulin regular, 70/30, (NOVOLIN 70/30) 100 unit/mL (70-30) injection Inject 25 Units into the skin 2 (two) times daily.       insulin syringe-needle U-100 0.5 mL 31 gauge x 5/16 Syrg       insulin syringe-needle U-100 1/2 mL 30 gauge x 5/16 Syrg       PACERONE 100 mg Tab Take 100 mg by mouth once daily.        No current facility-administered medications on file prior to visit.        Review of patient's allergies  "indicates:  No Known Allergies    Past Surgical History:   Procedure Laterality Date    APPENDECTOMY      GALLBLADDER SURGERY      TUMOR REMOVAL Left     foot       History reviewed. No pertinent family history.    Social History     Socioeconomic History    Marital status:      Spouse name: Not on file    Number of children: Not on file    Years of education: Not on file    Highest education level: Not on file   Social Needs    Financial resource strain: Not on file    Food insecurity - worry: Not on file    Food insecurity - inability: Not on file    Transportation needs - medical: Not on file    Transportation needs - non-medical: Not on file   Occupational History    Not on file   Tobacco Use    Smoking status: Former Smoker    Smokeless tobacco: Former User   Substance and Sexual Activity    Alcohol use: Not on file    Drug use: Not on file    Sexual activity: Not on file   Other Topics Concern    Not on file   Social History Narrative    Not on file         Review of Systems   Constitution: Negative for chills, fever and weakness.   Cardiovascular: Negative for claudication and leg swelling.   Respiratory: Negative for cough and shortness of breath.    Skin: Positive for dry skin, nail changes and poor wound healing (hx of left foot ulcer). Negative for itching and rash.   Musculoskeletal: Positive for joint pain. Negative for falls, joint swelling and muscle weakness.   Gastrointestinal: Negative for diarrhea, nausea and vomiting.   Neurological: Positive for numbness and paresthesias. Negative for tremors.   Psychiatric/Behavioral: Negative for altered mental status and hallucinations.           Objective:       Vitals:    01/08/19 1546   Weight: 67.6 kg (149 lb)   Height: 5' 11" (1.803 m)   PainSc: 0-No pain       Physical Exam   Constitutional:  Non-toxic appearance. He does not have a sickly appearance. No distress.   Pt. is well-developed, well-nourished, appears stated age, " in no acute distress, alert and oriented x 3. No evidence of depression, anxiety, or agitation. Calm, cooperative, and communicative. Appropriate interactions and affect.   Cardiovascular:   Pulses:       Dorsalis pedis pulses are 1+ on the right side, and 1+ on the left side.        Posterior tibial pulses are 1+ on the right side, and 1+ on the left side.   Dorsalis pedis and posterior tibial pulses are diminished Bilaterally. Toes are cool to touch. Feet are warm proximally.There is decreased digital hair. Skin is atrophic   Pulmonary/Chest: No respiratory distress.   Musculoskeletal:        Right ankle: No tenderness. No lateral malleolus, no medial malleolus, no AITFL, no CF ligament and no posterior TFL tenderness found. Achilles tendon exhibits no pain, no defect and normal Elias's test results.        Left ankle: No tenderness. No lateral malleolus, no medial malleolus, no AITFL, no CF ligament and no posterior TFL tenderness found. Achilles tendon exhibits no pain, no defect and normal Elias's test results.        Right foot: There is no tenderness and no bony tenderness.        Left foot: There is no tenderness and no bony tenderness.   Decreased stride, station of gait.  apropulsive toe off.  Increased angle and base of gait.    Patient has hammertoes of digits 2-5 bilateral partially reducible without symptom today.    Visible and palpable bunion without pain at dorsomedial 1st metatarsal head right and left.  Hallux abducted right and left partially reducible, tracks laterally without being track bound.  No ecchymosis, erythema, edema, or cardinal signs infection or signs of trauma same foot.    Visible and palpable tailors bunion at dorsolateral 5th metatarsal head right and left.  5th digit is varus right and left partially reducible. No ecchymosis, erythema, edema, or cardinal signs infection.    Fat pad atrophy to heels and met heads bilateral     Lymphadenopathy:   No lymphatic  streaking    Negative lymphadenopathy bilateral popliteal fossa and tarsal tunnel.     Neurological:   Saucier-Tamanna 5.07 monofilamant testing is diminished Ryan feet. Decreased/absent vibratory sensation bilateral feet to 128Hz tuning fork.     Paresthesias, and hyperesthesia bilateral feet with no clearly identified trigger or source.           Skin: Skin is warm and dry. No abrasion, no burn, no ecchymosis, no laceration, no petechiae and no rash noted. He is not diaphoretic. No cyanosis. No pallor. Nails show no clubbing.   Skin is thin, atrophic, xerotic    Toenails 1-5 bilaterally are elongated by 2-3 mm, thickened by 2-3 mm, discolored/yellowed, dystrophic, brittle with subungual debris.    Focal hyperkeratotic lesion consisting entirely of hyperkeratotic tissue without open skin, drainage, pus, fluctuance, malodor, or signs of infection: sub 1st and 5th MTPJ ryan   Psychiatric: His mood appears not anxious. His affect is not inappropriate. His speech is not slurred. He is not combative. He is communicative. He is attentive.   Nursing note reviewed.        Assessment:       Encounter Diagnoses   Name Primary?    Type II diabetes mellitus with neurological manifestations Yes    Hammer toes of both feet     Hallux abducto valgus, left     Hallux abducto valgus, right     Plantar fat pad atrophy     Corn or callus     Onychomycosis due to dermatophyte          Plan:       Avni was seen today for diabetes mellitus, diabetic foot exam and nail care.    Diagnoses and all orders for this visit:    Type II diabetes mellitus with neurological manifestations  -     DIABETIC SHOES FOR HOME USE    Hammer toes of both feet  -     DIABETIC SHOES FOR HOME USE    Hallux abducto valgus, left  -     DIABETIC SHOES FOR HOME USE    Hallux abducto valgus, right  -     DIABETIC SHOES FOR HOME USE    Plantar fat pad atrophy  -     DIABETIC SHOES FOR HOME USE    Corn or callus    Onychomycosis due to dermatophyte      I  counseled the patient on his conditions, their implications and medical management.    Shoe inspection. Diabetic Foot Education. Patient reminded of the importance of good nutrition and blood sugar control to help prevent podiatric complications of diabetes. Patient instructed on proper foot hygeine. We discussed wearing proper shoe gear, daily foot inspections, never walking without protective shoe gear, never putting sharp instruments to feet.      - Rx diabetic shoes for protection and support    - With patient's permission, nails were aggressively reduced and debrided x 10 to their soft tissue attachment mechanically and with electric , removing all offending nail and debris. Patient relates relief following the procedure.     After cleansing the  area w/ alcohol prep pad the above mentioned hyperkeratosis was trimmed utilizing No 15 scapel, to a smooth base with out incident. Patient tolerated this  well and reported comfort to the area of sub 1st and 5th MTPJ bilaterally    He will continue to monitor the areas daily, inspect her feet, wear protective shoe gear when ambulatory, moisturizer to maintain skin integrity and follow in this office in approximately  2-3 months, sooner p.r.n.

## 2019-04-08 ENCOUNTER — OFFICE VISIT (OUTPATIENT)
Dept: PODIATRY | Facility: CLINIC | Age: 74
End: 2019-04-08
Payer: MEDICARE

## 2019-04-08 VITALS — WEIGHT: 149 LBS | HEIGHT: 71 IN | BODY MASS INDEX: 20.86 KG/M2

## 2019-04-08 DIAGNOSIS — B35.1 ONYCHOMYCOSIS DUE TO DERMATOPHYTE: ICD-10-CM

## 2019-04-08 DIAGNOSIS — L84 CORN OR CALLUS: ICD-10-CM

## 2019-04-08 DIAGNOSIS — M20.12 HALLUX ABDUCTO VALGUS, LEFT: ICD-10-CM

## 2019-04-08 DIAGNOSIS — M20.11 HALLUX ABDUCTO VALGUS, RIGHT: ICD-10-CM

## 2019-04-08 DIAGNOSIS — M20.42 HAMMER TOES OF BOTH FEET: ICD-10-CM

## 2019-04-08 DIAGNOSIS — E11.49 TYPE II DIABETES MELLITUS WITH NEUROLOGICAL MANIFESTATIONS: Primary | ICD-10-CM

## 2019-04-08 DIAGNOSIS — L90.9 PLANTAR FAT PAD ATROPHY: ICD-10-CM

## 2019-04-08 DIAGNOSIS — M20.41 HAMMER TOES OF BOTH FEET: ICD-10-CM

## 2019-04-08 PROCEDURE — 99999 PR PBB SHADOW E&M-EST. PATIENT-LVL III: ICD-10-PCS | Mod: PBBFAC,,, | Performed by: PODIATRIST

## 2019-04-08 PROCEDURE — 11056 PR TRIM BENIGN HYPERKERATOTIC SKIN LESION,2-4: ICD-10-PCS | Mod: Q9,S$GLB,, | Performed by: PODIATRIST

## 2019-04-08 PROCEDURE — 11721 DEBRIDE NAIL 6 OR MORE: CPT | Mod: Q9,59,S$GLB, | Performed by: PODIATRIST

## 2019-04-08 PROCEDURE — 11056 PARNG/CUTG B9 HYPRKR LES 2-4: CPT | Mod: Q9,S$GLB,, | Performed by: PODIATRIST

## 2019-04-08 PROCEDURE — 99499 UNLISTED E&M SERVICE: CPT | Mod: S$GLB,,, | Performed by: PODIATRIST

## 2019-04-08 PROCEDURE — 99999 PR PBB SHADOW E&M-EST. PATIENT-LVL III: CPT | Mod: PBBFAC,,, | Performed by: PODIATRIST

## 2019-04-08 PROCEDURE — 11721 PR DEBRIDEMENT OF NAILS, 6 OR MORE: ICD-10-PCS | Mod: Q9,59,S$GLB, | Performed by: PODIATRIST

## 2019-04-08 PROCEDURE — 99499 NO LOS: ICD-10-PCS | Mod: S$GLB,,, | Performed by: PODIATRIST

## 2019-04-08 NOTE — PATIENT INSTRUCTIONS
Recommend lotions: eucerin, eucerin for diabetics, aquaphor, A&D ointment, gold bond for diabetics, sween, Rome's Bees all purpose baby ointment,  urea 40 with aloe (found on amazon.com)    Shoe recommendations: (try 6pm.com, zappos.com , nordstromrack.[employer], or shoes.[employer] for discounted prices) you can visit DSW shoes in [place]  or bop.fm [facility] in the [facility] (there are also several shoe brand outlets in the [facility])    Asics (GT 2000 or gel foundations), new balance stability type shoes, saucony (stabil c3),  Drew (GTS or Beast or transcend), propet (tennis shoe)    Sofdani Knox (women) Donna&Mich (men), clarks, crocs, aerosoles, naturalizers, SAS, ecco, born, ant good, rockports (dress shoes)    Vionic, burkenstocks, fitflops, propet (sandals)  Nike comfort thong sandals, crocs, propet (house shoes)    Nail Home remedy:  Vicks Vapor rub to nails for easier manageability      Diabetes: Inspecting Your Feet  Diabetes increases your chances of developing foot problems. So inspect your feet every day. This helps you find small skin irritations before they become serious infections. If you have trouble seeing the bottoms of your feet, use a mirror or ask a family member or friend to help.     Pressure spots on the bottom of the foot are common areas where problems develop.   How to check your feet  Below are tips to help you look for foot problems. Try to check your feet at the same time each day, such as when you get out of bed in the morning:  · Check the top of each foot. The tops of toes, back of the heel, and outer edge of the foot can get a lot of rubbing from poor-fitting shoes.  · Check the bottom of each foot. Daily wear and tear often leads to problems at pressure spots.  · Check the toes and nails. Fungal infections often occur between toes. Toenail problems can also be a sign of fungal infections or lead to breaks in the skin.  · Check your shoes, too. Loose objects inside a shoe can injure the  foot. Use your hand to feel inside your shoes for things like robert, loose stitching, or rough areas that could irritate your skin.  Warning signs  Look for any color changes in the foot. Redness with streaks can signal a severe infection, which needs immediate medical attention. Tell your doctor right away if you have any of these problems:  · Swelling, sometimes with color changes, may be a sign of poor blood flow or infection. Symptoms include tenderness and an increase in the size of your foot.  · Warm or hot areas on your feet may be signs of infection. A foot that is cold may not be getting enough blood.  · Sensations such as burning, tingling, or pins and needles can be signs of a problem. Also check for areas that may be numb.  · Hot spots are caused by friction or pressure. Look for hot spots in areas that get a lot of rubbing. Hot spots can turn into blisters, calluses, or sores.  · Cracks and sores are caused by dry or irritated skin. They are a sign that the skin is breaking down, which can lead to infection.  · Toenail problems to watch for include nails growing into the skin (ingrown toenail) and causing redness or pain. Thick, yellow, or discolored nails can signal a fungal infection.  · Drainage and odor can develop from untreated sores and ulcers. Call your doctor right away if you notice white or yellow drainage, bleeding, or unpleasant odor.   © 8081-5377 [employer]. [address] [place] [address]. All rights reserved. This information is not intended as a substitute for professional medical care. Always follow your healthcare professional's instructions.        Step-by-Step:  Inspecting Your Feet (Diabetes)    Date Last Reviewed: 10/1/2016  © 6004-3404 [employer]. [address] [place] [address]. All rights reserved. This information is not intended as a substitute for professional medical care. Always follow your healthcare professional's  instructions.

## 2019-04-09 NOTE — PROGRESS NOTES
Subjective:      Patient ID: Avni Daniels is a 74 y.o. male.    Chief Complaint: Diabetic Foot Exam (Pcp Dr. Gusman ); Diabetes Mellitus; and Nail Care    Avni is a 74 y.o. male who presents to the clinic for evaluation and treatment of high risk feet. Avni has a past medical history of Diabetes mellitus, type 2 and Hypertension. The patient's chief complaint is elongated, thickened toenails aggravated by increased weight bearing, shoe gear, pressure.  This patient has documented high risk feet requiring routine maintenance secondary to diabetes mellitis and those secondary complications of diabetes, as mentioned..    PCP: Reyes Gusman MD    Date Last Seen by PCP:   Chief Complaint   Patient presents with    Diabetic Foot Exam     Pcp Dr. Gusman     Diabetes Mellitus    Nail Care       Current shoe gear:   Rx diabetic extra depth shoes and custom accommodative insoles      No results found for: HGBA1C        Patient Active Problem List   Diagnosis    Diabetic ulcer of left foot associated with type 2 diabetes mellitus    Non-pressure chronic ulcer of other part of left foot with fat layer exposed       Current Outpatient Medications on File Prior to Visit   Medication Sig Dispense Refill    aspirin 81 MG Chew Take 81 mg by mouth once daily.      atorvastatin (LIPITOR) 40 MG tablet       clopidogrel (PLAVIX) 75 mg tablet Take 75 mg by mouth once.       enalapril (VASOTEC) 5 MG tablet Take 5 mg by mouth once daily.       insulin NPH-insulin regular, 70/30, (NOVOLIN 70/30) 100 unit/mL (70-30) injection Inject 25 Units into the skin 2 (two) times daily.       insulin syringe-needle U-100 0.5 mL 31 gauge x 5/16 Syrg       insulin syringe-needle U-100 1/2 mL 30 gauge x 5/16 Syrg       PACERONE 100 mg Tab Take 100 mg by mouth once daily.        No current facility-administered medications on file prior to visit.        Review of patient's allergies indicates:  No Known Allergies    Past Surgical History:    Procedure Laterality Date    APPENDECTOMY      GALLBLADDER SURGERY      TUMOR REMOVAL Left     foot       History reviewed. No pertinent family history.    Social History     Socioeconomic History    Marital status:      Spouse name: Not on file    Number of children: Not on file    Years of education: Not on file    Highest education level: Not on file   Occupational History    Not on file   Social Needs    Financial resource strain: Not on file    Food insecurity:     Worry: Not on file     Inability: Not on file    Transportation needs:     Medical: Not on file     Non-medical: Not on file   Tobacco Use    Smoking status: Former Smoker    Smokeless tobacco: Former User   Substance and Sexual Activity    Alcohol use: Not on file    Drug use: Not on file    Sexual activity: Not on file   Lifestyle    Physical activity:     Days per week: Not on file     Minutes per session: Not on file    Stress: Not on file   Relationships    Social connections:     Talks on phone: Not on file     Gets together: Not on file     Attends [employer] service: Not on file     Active member of club or organization: Not on file     Attends meetings of clubs or organizations: Not on file     Relationship status: Not on file   Other Topics Concern    Not on file   Social History Narrative    Not on file         Review of Systems   Constitution: Negative for chills and fever.   Cardiovascular: Negative for claudication and leg swelling.   Respiratory: Negative for cough and shortness of breath.    Skin: Positive for dry skin, nail changes and poor wound healing (hx of left foot ulcer). Negative for itching and rash.   Musculoskeletal: Positive for joint pain. Negative for falls, joint swelling and muscle weakness.   Gastrointestinal: Negative for diarrhea, nausea and vomiting.   Neurological: Positive for numbness and paresthesias. Negative for tremors and weakness.   Psychiatric/Behavioral: Negative for  "altered mental status and hallucinations.           Objective:       Vitals:    04/08/19 1555   Weight: 67.6 kg (149 lb)   Height: 5' 11" (1.803 m)   PainSc: 0-No pain       Physical Exam   Constitutional:  Non-toxic appearance. He does not have a sickly appearance. No distress.   Pt. is well-developed, well-nourished, appears stated age, in no acute distress, alert and oriented x 3. No evidence of depression, anxiety, or agitation. Calm, cooperative, and communicative. Appropriate interactions and affect.   Cardiovascular:   Pulses:       Dorsalis pedis pulses are 1+ on the right side, and 1+ on the left side.        Posterior tibial pulses are 1+ on the right side, and 1+ on the left side.   Dorsalis pedis and posterior tibial pulses are diminished Bilaterally. Toes are cool to touch. Feet are warm proximally.There is decreased digital hair. Skin is atrophic   Pulmonary/Chest: No respiratory distress.   Musculoskeletal:        Right ankle: No tenderness. No lateral malleolus, no medial malleolus, no AITFL, no CF ligament and no posterior TFL tenderness found. Achilles tendon exhibits no pain, no defect and normal Elias's test results.        Left ankle: No tenderness. No lateral malleolus, no medial malleolus, no AITFL, no CF ligament and no posterior TFL tenderness found. Achilles tendon exhibits no pain, no defect and normal Elias's test results.        Right foot: There is no tenderness and no bony tenderness.        Left foot: There is no tenderness and no bony tenderness.   Decreased stride, station of gait.  apropulsive toe off.  Increased angle and base of gait.    Patient has hammertoes of digits 2-5 bilateral partially reducible without symptom today.    Visible and palpable bunion without pain at dorsomedial 1st metatarsal head right and left.  Hallux abducted right and left partially reducible, tracks laterally without being track bound.  No ecchymosis, erythema, edema, or cardinal signs infection " or signs of trauma same foot.    Visible and palpable tailors bunion at dorsolateral 5th metatarsal head right and left.  5th digit is varus right and left partially reducible. No ecchymosis, erythema, edema, or cardinal signs infection.    Fat pad atrophy to heels and met heads bilateral     Lymphadenopathy:   No lymphatic streaking    Negative lymphadenopathy bilateral popliteal fossa and tarsal tunnel.     Neurological:   [place] 5.07 monofilamant testing is diminished Ryan feet. Decreased/absent vibratory sensation bilateral feet to 128Hz tuning fork.     Paresthesias, and hyperesthesia bilateral feet with no clearly identified trigger or source.           Skin: Skin is warm and dry. No abrasion, no burn, no ecchymosis, no laceration, no petechiae and no rash noted. He is not diaphoretic. No cyanosis. No pallor. Nails show no clubbing.   Skin is thin, atrophic, xerotic    Toenails 1-5 bilaterally are elongated by 2-3 mm, thickened by 2-3 mm, discolored/yellowed, dystrophic, brittle with subungual debris.    Focal hyperkeratotic lesion consisting entirely of hyperkeratotic tissue without open skin, drainage, pus, fluctuance, malodor, or signs of infection: sub 1st and 5th MTPJ ryan   Psychiatric: His mood appears not anxious. His affect is not inappropriate. His speech is not slurred. He is not combative. He is communicative. He is attentive.   Nursing note reviewed.        Assessment:       Encounter Diagnoses   Name Primary?    Type II diabetes mellitus with neurological manifestations Yes    Hammer toes of both feet     Hallux abducto valgus, left     Hallux abducto valgus, right     Plantar fat pad atrophy     Corn or callus     Onychomycosis due to dermatophyte          Plan:       Avni was seen today for diabetic foot exam, diabetes mellitus and nail care.    Diagnoses and all orders for this visit:    Type II diabetes mellitus with neurological manifestations    Hammer toes of both  feet    Hallux abducto valgus, left    Hallux abducto valgus, right    Plantar fat pad atrophy    Corn or callus    Onychomycosis due to dermatophyte      I counseled the patient on his conditions, their implications and medical management.    Shoe inspection. Diabetic Foot Education. Patient reminded of the importance of good nutrition and blood sugar control to help prevent podiatric complications of diabetes. Patient instructed on proper foot hygeine. We discussed wearing proper shoe gear, daily foot inspections, never walking without protective shoe gear, never putting sharp instruments to feet.      - With patient's permission, nails were aggressively reduced and debrided x 10 to their soft tissue attachment mechanically and with electric , removing all offending nail and debris. Patient relates relief following the procedure.     After cleansing the  area w/ alcohol prep pad the above mentioned hyperkeratosis was trimmed utilizing No 15 scapel, to a smooth base with out incident. Patient tolerated this  well and reported comfort to the area of sub 1st and 5th MTPJ bilaterally    He will continue to monitor the areas daily, inspect her feet, wear protective shoe gear when ambulatory, moisturizer to maintain skin integrity and follow in this office in approximately  2-3 months, sooner p.r.n.

## 2019-07-08 ENCOUNTER — OFFICE VISIT (OUTPATIENT)
Dept: PODIATRY | Facility: CLINIC | Age: 74
End: 2019-07-08
Payer: MEDICARE

## 2019-07-08 VITALS
WEIGHT: 149 LBS | SYSTOLIC BLOOD PRESSURE: 108 MMHG | HEIGHT: 71 IN | DIASTOLIC BLOOD PRESSURE: 70 MMHG | BODY MASS INDEX: 20.86 KG/M2

## 2019-07-08 DIAGNOSIS — M20.12 HALLUX ABDUCTO VALGUS, LEFT: ICD-10-CM

## 2019-07-08 DIAGNOSIS — E11.49 TYPE II DIABETES MELLITUS WITH NEUROLOGICAL MANIFESTATIONS: Primary | ICD-10-CM

## 2019-07-08 DIAGNOSIS — M20.41 HAMMER TOES OF BOTH FEET: ICD-10-CM

## 2019-07-08 DIAGNOSIS — B35.1 ONYCHOMYCOSIS DUE TO DERMATOPHYTE: ICD-10-CM

## 2019-07-08 DIAGNOSIS — M20.11 HALLUX ABDUCTO VALGUS, RIGHT: ICD-10-CM

## 2019-07-08 DIAGNOSIS — M20.42 HAMMER TOES OF BOTH FEET: ICD-10-CM

## 2019-07-08 DIAGNOSIS — L90.9 PLANTAR FAT PAD ATROPHY: ICD-10-CM

## 2019-07-08 DIAGNOSIS — L84 CORN OR CALLUS: ICD-10-CM

## 2019-07-08 PROCEDURE — 99999 PR PBB SHADOW E&M-EST. PATIENT-LVL II: ICD-10-PCS | Mod: PBBFAC,,, | Performed by: PODIATRIST

## 2019-07-08 PROCEDURE — 99499 UNLISTED E&M SERVICE: CPT | Mod: S$GLB,,, | Performed by: PODIATRIST

## 2019-07-08 PROCEDURE — 11056 PARNG/CUTG B9 HYPRKR LES 2-4: CPT | Mod: Q9,S$GLB,, | Performed by: PODIATRIST

## 2019-07-08 PROCEDURE — 99499 NO LOS: ICD-10-PCS | Mod: S$GLB,,, | Performed by: PODIATRIST

## 2019-07-08 PROCEDURE — 99999 PR PBB SHADOW E&M-EST. PATIENT-LVL II: CPT | Mod: PBBFAC,,, | Performed by: PODIATRIST

## 2019-07-08 PROCEDURE — 11721 DEBRIDE NAIL 6 OR MORE: CPT | Mod: 59,Q9,S$GLB, | Performed by: PODIATRIST

## 2019-07-08 PROCEDURE — 11056 PR TRIM BENIGN HYPERKERATOTIC SKIN LESION,2-4: ICD-10-PCS | Mod: Q9,S$GLB,, | Performed by: PODIATRIST

## 2019-07-08 PROCEDURE — 11721 PR DEBRIDEMENT OF NAILS, 6 OR MORE: ICD-10-PCS | Mod: 59,Q9,S$GLB, | Performed by: PODIATRIST

## 2019-07-09 NOTE — PROGRESS NOTES
Subjective:      Patient ID: Avni Daniels is a 74 y.o. male.    Chief Complaint: Diabetes Mellitus (PCP dR. Gusman 6/24/19); Diabetic Foot Exam; and Nail Care    Avni is a 74 y.o. male who presents to the clinic for evaluation and treatment of high risk feet. Avni has a past medical history of Diabetes mellitus, type 2 and Hypertension. The patient's chief complaint is elongated, thickened toenails aggravated by increased weight bearing, shoe gear, pressure.  This patient has documented high risk feet requiring routine maintenance secondary to diabetes mellitis and those secondary complications of diabetes, as mentioned..    PCP: Reyes Gusman MD    Date Last Seen by PCP:   Chief Complaint   Patient presents with    Diabetes Mellitus     PCP dR. Gusman 6/24/19    Diabetic Foot Exam    Nail Care       Current shoe gear:   Rx diabetic extra depth shoes and custom accommodative insoles      No results found for: HGBA1C        Patient Active Problem List   Diagnosis    Diabetic ulcer of left foot associated with type 2 diabetes mellitus    Non-pressure chronic ulcer of other part of left foot with fat layer exposed       Current Outpatient Medications on File Prior to Visit   Medication Sig Dispense Refill    aspirin 81 MG Chew Take 81 mg by mouth once daily.      atorvastatin (LIPITOR) 40 MG tablet       clopidogrel (PLAVIX) 75 mg tablet Take 75 mg by mouth once.       enalapril (VASOTEC) 5 MG tablet Take 5 mg by mouth once daily.       insulin NPH-insulin regular, 70/30, (NOVOLIN 70/30) 100 unit/mL (70-30) injection Inject 25 Units into the skin 2 (two) times daily.       insulin syringe-needle U-100 0.5 mL 31 gauge x 5/16 Syrg       insulin syringe-needle U-100 1/2 mL 30 gauge x 5/16 Syrg       PACERONE 100 mg Tab Take 100 mg by mouth once daily.        No current facility-administered medications on file prior to visit.        Review of patient's allergies indicates:  No Known Allergies    Past  Surgical History:   Procedure Laterality Date    APPENDECTOMY      GALLBLADDER SURGERY      TUMOR REMOVAL Left     foot       History reviewed. No pertinent family history.    Social History     Socioeconomic History    Marital status:      Spouse name: Not on file    Number of children: Not on file    Years of education: Not on file    Highest education level: Not on file   Occupational History    Not on file   Social Needs    Financial resource strain: Not on file    Food insecurity:     Worry: Not on file     Inability: Not on file    Transportation needs:     Medical: Not on file     Non-medical: Not on file   Tobacco Use    Smoking status: Former Smoker    Smokeless tobacco: Former User   Substance and Sexual Activity    Alcohol use: Not on file    Drug use: Not on file    Sexual activity: Not on file   Lifestyle    Physical activity:     Days per week: Not on file     Minutes per session: Not on file    Stress: Not on file   Relationships    Social connections:     Talks on phone: Not on file     Gets together: Not on file     Attends [employer]ism service: Not on file     Active member of club or organization: Not on file     Attends meetings of clubs or organizations: Not on file     Relationship status: Not on file   Other Topics Concern    Not on file   Social History Narrative    Not on file         Review of Systems   Constitution: Negative for chills and fever.   Cardiovascular: Negative for claudication and leg swelling.   Respiratory: Negative for cough and shortness of breath.    Skin: Positive for dry skin, nail changes and poor wound healing (hx of left foot ulcer). Negative for itching and rash.   Musculoskeletal: Positive for joint pain. Negative for falls, joint swelling and muscle weakness.   Gastrointestinal: Negative for diarrhea, nausea and vomiting.   Neurological: Positive for numbness and paresthesias. Negative for tremors and weakness.   Psychiatric/Behavioral:  "Negative for altered mental status and hallucinations.           Objective:       Vitals:    07/08/19 1543   BP: 108/70   Weight: 67.6 kg (149 lb)   Height: 5' 11" (1.803 m)   PainSc: 0-No pain       Physical Exam   Constitutional:  Non-toxic appearance. He does not have a sickly appearance. No distress.   Pt. is well-developed, well-nourished, appears stated age, in no acute distress, alert and oriented x 3. No evidence of depression, anxiety, or agitation. Calm, cooperative, and communicative. Appropriate interactions and affect.   Cardiovascular:   Pulses:       Dorsalis pedis pulses are 1+ on the right side, and 1+ on the left side.        Posterior tibial pulses are 1+ on the right side, and 1+ on the left side.   Dorsalis pedis and posterior tibial pulses are diminished Bilaterally. Toes are cool to touch. Feet are warm proximally.There is decreased digital hair. Skin is atrophic   Pulmonary/Chest: No respiratory distress.   Musculoskeletal:        Right ankle: No tenderness. No lateral malleolus, no medial malleolus, no AITFL, no CF ligament and no posterior TFL tenderness found. Achilles tendon exhibits no pain, no defect and normal Elias's test results.        Left ankle: No tenderness. No lateral malleolus, no medial malleolus, no AITFL, no CF ligament and no posterior TFL tenderness found. Achilles tendon exhibits no pain, no defect and normal Elias's test results.        Right foot: There is no tenderness and no bony tenderness.        Left foot: There is no tenderness and no bony tenderness.   Decreased stride, station of gait.  apropulsive toe off.  Increased angle and base of gait.    Patient has hammertoes of digits 2-5 bilateral partially reducible without symptom today.    Visible and palpable bunion without pain at dorsomedial 1st metatarsal head right and left.  Hallux abducted right and left partially reducible, tracks laterally without being track bound.  No ecchymosis, erythema, edema, " or cardinal signs infection or signs of trauma same foot.    Visible and palpable tailors bunion at dorsolateral 5th metatarsal head right and left.  5th digit is varus right and left partially reducible. No ecchymosis, erythema, edema, or cardinal signs infection.    Fat pad atrophy to heels and met heads bilateral     Lymphadenopathy:   No lymphatic streaking    Negative lymphadenopathy bilateral popliteal fossa and tarsal tunnel.     Neurological:   [place]am[place] 5.07 monofilamant testing is diminished Ryan feet. Decreased/absent vibratory sensation bilateral feet to 128Hz tuning fork.     Paresthesias, and hyperesthesia bilateral feet with no clearly identified trigger or source.           Skin: Skin is warm and dry. No abrasion, no burn, no ecchymosis, no laceration, no petechiae and no rash noted. He is not diaphoretic. No cyanosis. No pallor. Nails show no clubbing.   Skin is thin, atrophic, xerotic    Toenails 1-5 bilaterally are elongated by 2-3 mm, thickened by 2-3 mm, discolored/yellowed, dystrophic, brittle with subungual debris.    Focal hyperkeratotic lesion consisting entirely of hyperkeratotic tissue without open skin, drainage, pus, fluctuance, malodor, or signs of infection: sub 1st and 5th MTPJ ryan   Psychiatric: His mood appears not anxious. His affect is not inappropriate. His speech is not slurred. He is not combative. He is communicative. He is attentive.   Nursing note reviewed.        Assessment:       Encounter Diagnoses   Name Primary?    Type II diabetes mellitus with neurological manifestations Yes    Hammer toes of both feet     Hallux abducto valgus, left     Hallux abducto valgus, right     Plantar fat pad atrophy     Corn or callus     Onychomycosis due to dermatophyte          Plan:       Avni was seen today for diabetes mellitus, diabetic foot exam and nail care.    Diagnoses and all orders for this visit:    Type II diabetes mellitus with neurological  manifestations    Hammer toes of both feet    Hallux abducto valgus, left    Hallux abducto valgus, right    Plantar fat pad atrophy    Corn or callus    Onychomycosis due to dermatophyte      I counseled the patient on his conditions, their implications and medical management.    Shoe inspection. Diabetic Foot Education. Patient reminded of the importance of good nutrition and blood sugar control to help prevent podiatric complications of diabetes. Patient instructed on proper foot hygeine. We discussed wearing proper shoe gear, daily foot inspections, never walking without protective shoe gear, never putting sharp instruments to feet.      - With patient's permission, nails were aggressively reduced and debrided x 10 to their soft tissue attachment mechanically and with electric , removing all offending nail and debris. Patient relates relief following the procedure.     After cleansing the  area w/ alcohol prep pad the above mentioned hyperkeratosis was trimmed utilizing No 15 scapel, to a smooth base with out incident. Patient tolerated this  well and reported comfort to the area of sub 1st and 5th MTPJ bilaterally    He will continue to monitor the areas daily, inspect her feet, wear protective shoe gear when ambulatory, moisturizer to maintain skin integrity and follow in this office in approximately  2-3 months, sooner p.r.n.

## 2019-10-31 ENCOUNTER — OFFICE VISIT (OUTPATIENT)
Dept: PODIATRY | Facility: CLINIC | Age: 74
End: 2019-10-31
Payer: MEDICARE

## 2019-10-31 VITALS
HEIGHT: 71 IN | BODY MASS INDEX: 20.87 KG/M2 | SYSTOLIC BLOOD PRESSURE: 127 MMHG | WEIGHT: 149.06 LBS | DIASTOLIC BLOOD PRESSURE: 64 MMHG | HEART RATE: 75 BPM

## 2019-10-31 DIAGNOSIS — B35.1 ONYCHOMYCOSIS DUE TO DERMATOPHYTE: ICD-10-CM

## 2019-10-31 DIAGNOSIS — E11.49 TYPE II DIABETES MELLITUS WITH NEUROLOGICAL MANIFESTATIONS: Primary | ICD-10-CM

## 2019-10-31 DIAGNOSIS — M20.12 HALLUX ABDUCTO VALGUS, LEFT: ICD-10-CM

## 2019-10-31 DIAGNOSIS — M20.42 HAMMER TOES OF BOTH FEET: ICD-10-CM

## 2019-10-31 DIAGNOSIS — M20.11 HALLUX ABDUCTO VALGUS, RIGHT: ICD-10-CM

## 2019-10-31 DIAGNOSIS — L90.9 PLANTAR FAT PAD ATROPHY: ICD-10-CM

## 2019-10-31 DIAGNOSIS — L84 CORN OR CALLUS: ICD-10-CM

## 2019-10-31 DIAGNOSIS — M20.41 HAMMER TOES OF BOTH FEET: ICD-10-CM

## 2019-10-31 PROCEDURE — 99999 PR PBB SHADOW E&M-EST. PATIENT-LVL III: CPT | Mod: PBBFAC,,, | Performed by: PODIATRIST

## 2019-10-31 PROCEDURE — 11721 PR DEBRIDEMENT OF NAILS, 6 OR MORE: ICD-10-PCS | Mod: 59,Q9,S$GLB, | Performed by: PODIATRIST

## 2019-10-31 PROCEDURE — 11056 PR TRIM BENIGN HYPERKERATOTIC SKIN LESION,2-4: ICD-10-PCS | Mod: Q9,S$GLB,, | Performed by: PODIATRIST

## 2019-10-31 PROCEDURE — 11721 DEBRIDE NAIL 6 OR MORE: CPT | Mod: 59,Q9,S$GLB, | Performed by: PODIATRIST

## 2019-10-31 PROCEDURE — 11056 PARNG/CUTG B9 HYPRKR LES 2-4: CPT | Mod: Q9,S$GLB,, | Performed by: PODIATRIST

## 2019-10-31 PROCEDURE — 99499 UNLISTED E&M SERVICE: CPT | Mod: S$GLB,,, | Performed by: PODIATRIST

## 2019-10-31 PROCEDURE — 99999 PR PBB SHADOW E&M-EST. PATIENT-LVL III: ICD-10-PCS | Mod: PBBFAC,,, | Performed by: PODIATRIST

## 2019-10-31 PROCEDURE — 99499 NO LOS: ICD-10-PCS | Mod: S$GLB,,, | Performed by: PODIATRIST

## 2019-10-31 RX ORDER — METOPROLOL TARTRATE 25 MG/1
25 TABLET, FILM COATED ORAL DAILY
COMMUNITY

## 2019-10-31 NOTE — PROGRESS NOTES
Subjective:      Patient ID: Avni Daniels is a 74 y.o. male.    Chief Complaint: Follow-up (ov 10/24/19 RICARDO Gusman) and Diabetes Mellitus    Avni is a 74 y.o. male who presents to the clinic for evaluation and treatment of high risk feet. Avni has a past medical history of Diabetes mellitus, type 2 and Hypertension. The patient's chief complaint is elongated, thickened toenails aggravated by increased weight bearing, shoe gear, pressure.  This patient has documented high risk feet requiring routine maintenance secondary to diabetes mellitis and those secondary complications of diabetes, as mentioned..    PCP: Reyes Gusman MD    Date Last Seen by PCP:   Chief Complaint   Patient presents with    Follow-up     ov 10/24/19 RICARDO Gusman    Diabetes Mellitus       Current shoe gear:   Rx diabetic extra depth shoes and custom accommodative insoles      No results found for: HGBA1C        Patient Active Problem List   Diagnosis    Diabetic ulcer of left foot associated with type 2 diabetes mellitus    Non-pressure chronic ulcer of other part of left foot with fat layer exposed       Current Outpatient Medications on File Prior to Visit   Medication Sig Dispense Refill    metoprolol tartrate (LOPRESSOR) 25 MG tablet Take 25 mg by mouth Daily.      aspirin 81 MG Chew Take 81 mg by mouth once daily.      atorvastatin (LIPITOR) 40 MG tablet       clopidogrel (PLAVIX) 75 mg tablet Take 75 mg by mouth once.       enalapril (VASOTEC) 5 MG tablet Take 5 mg by mouth once daily.       insulin NPH-insulin regular, 70/30, (NOVOLIN 70/30) 100 unit/mL (70-30) injection Inject 25 Units into the skin 2 (two) times daily.       insulin syringe-needle U-100 0.5 mL 31 gauge x 5/16 Syrg       insulin syringe-needle U-100 1/2 mL 30 gauge x 5/16 Syrg       PACERONE 100 mg Tab Take 100 mg by mouth once daily.        No current facility-administered medications on file prior to visit.        Review of patient's allergies  indicates:  No Known Allergies    Past Surgical History:   Procedure Laterality Date    APPENDECTOMY      GALLBLADDER SURGERY      TUMOR REMOVAL Left     foot       History reviewed. No pertinent family history.    Social History     Socioeconomic History    Marital status:      Spouse name: Not on file    Number of children: Not on file    Years of education: Not on file    Highest education level: Not on file   Occupational History    Not on file   Social Needs    Financial resource strain: Not on file    Food insecurity:     Worry: Not on file     Inability: Not on file    Transportation needs:     Medical: Not on file     Non-medical: Not on file   Tobacco Use    Smoking status: Former Smoker    Smokeless tobacco: Former User   Substance and Sexual Activity    Alcohol use: Not on file    Drug use: Not on file    Sexual activity: Not on file   Lifestyle    Physical activity:     Days per week: Not on file     Minutes per session: Not on file    Stress: Not on file   Relationships    Social connections:     Talks on phone: Not on file     Gets together: Not on file     Attends [employer] service: Not on file     Active member of club or organization: Not on file     Attends meetings of clubs or organizations: Not on file     Relationship status: Not on file   Other Topics Concern    Not on file   Social History Narrative    Not on file         Review of Systems   Constitution: Negative for chills and fever.   Cardiovascular: Negative for claudication and leg swelling.   Respiratory: Negative for cough and shortness of breath.    Skin: Positive for dry skin, nail changes and poor wound healing (hx of left foot ulcer). Negative for itching and rash.   Musculoskeletal: Positive for joint pain. Negative for falls, joint swelling and muscle weakness.   Gastrointestinal: Negative for diarrhea, nausea and vomiting.   Neurological: Positive for numbness and paresthesias. Negative for tremors  "and weakness.   Psychiatric/Behavioral: Negative for altered mental status and hallucinations.           Objective:       Vitals:    10/31/19 1418   BP: 127/64   Pulse: 75   Weight: 67.6 kg (149 lb 0.5 oz)   Height: 5' 11" (1.803 m)   PainSc: 0-No pain       Physical Exam   Constitutional:  Non-toxic appearance. He does not have a sickly appearance. No distress.   Pt. is well-developed, well-nourished, appears stated age, in no acute distress, alert and oriented x 3. No evidence of depression, anxiety, or agitation. Calm, cooperative, and communicative. Appropriate interactions and affect.   Cardiovascular:   Pulses:       Dorsalis pedis pulses are 1+ on the right side, and 1+ on the left side.        Posterior tibial pulses are 1+ on the right side, and 1+ on the left side.   Dorsalis pedis and posterior tibial pulses are diminished Bilaterally. Toes are cool to touch. Feet are warm proximally.There is decreased digital hair. Skin is atrophic   Pulmonary/Chest: No respiratory distress.   Musculoskeletal:        Right ankle: No tenderness. No lateral malleolus, no medial malleolus, no AITFL, no CF ligament and no posterior TFL tenderness found. Achilles tendon exhibits no pain, no defect and normal Elias's test results.        Left ankle: No tenderness. No lateral malleolus, no medial malleolus, no AITFL, no CF ligament and no posterior TFL tenderness found. Achilles tendon exhibits no pain, no defect and normal Elias's test results.        Right foot: There is no tenderness and no bony tenderness.        Left foot: There is no tenderness and no bony tenderness.   Decreased stride, station of gait.  apropulsive toe off.  Increased angle and base of gait.    Patient has hammertoes of digits 2-5 bilateral partially reducible without symptom today.    Visible and palpable bunion without pain at dorsomedial 1st metatarsal head right and left.  Hallux abducted right and left partially reducible, tracks laterally " without being track bound.  No ecchymosis, erythema, edema, or cardinal signs infection or signs of trauma same foot.    Visible and palpable tailors bunion at dorsolateral 5th metatarsal head right and left.  5th digit is varus right and left partially reducible. No ecchymosis, erythema, edema, or cardinal signs infection.    Fat pad atrophy to heels and met heads bilateral     Lymphadenopathy:   No lymphatic streaking    Negative lymphadenopathy bilateral popliteal fossa and tarsal tunnel.     Neurological:   [place]am[place] 5.07 monofilamant testing is diminished Ryan feet. Decreased/absent vibratory sensation bilateral feet to 128Hz tuning fork.     Paresthesias, and hyperesthesia bilateral feet with no clearly identified trigger or source.           Skin: Skin is warm and dry. No abrasion, no burn, no ecchymosis, no laceration, no petechiae and no rash noted. He is not diaphoretic. No cyanosis. No pallor. Nails show no clubbing.   Skin is thin, atrophic, xerotic    Toenails 1-5 bilaterally are elongated by 2-3 mm, thickened by 2-3 mm, discolored/yellowed, dystrophic, brittle with subungual debris.    Focal hyperkeratotic lesion consisting entirely of hyperkeratotic tissue without open skin, drainage, pus, fluctuance, malodor, or signs of infection: sub 1st and 5th MTPJ ryan   Psychiatric: His mood appears not anxious. His affect is not inappropriate. His speech is not slurred. He is not combative. He is communicative. He is attentive.   Nursing note reviewed.        Assessment:       Encounter Diagnoses   Name Primary?    Type II diabetes mellitus with neurological manifestations Yes    Hammer toes of both feet     Hallux abducto valgus, left     Hallux abducto valgus, right     Plantar fat pad atrophy     Corn or callus     Onychomycosis due to dermatophyte          Plan:       Avni was seen today for follow-up and diabetes mellitus.    Diagnoses and all orders for this visit:    Type II diabetes  mellitus with neurological manifestations    Hammer toes of both feet    Hallux abducto valgus, left    Hallux abducto valgus, right    Plantar fat pad atrophy    Corn or callus    Onychomycosis due to dermatophyte      I counseled the patient on his conditions, their implications and medical management.    Shoe inspection. Diabetic Foot Education. Patient reminded of the importance of good nutrition and blood sugar control to help prevent podiatric complications of diabetes. Patient instructed on proper foot hygeine. We discussed wearing proper shoe gear, daily foot inspections, never walking without protective shoe gear, never putting sharp instruments to feet.      - With patient's permission, nails were aggressively reduced and debrided x 10 to their soft tissue attachment mechanically and with electric , removing all offending nail and debris. Patient relates relief following the procedure.     After cleansing the  area w/ alcohol prep pad the above mentioned hyperkeratosis was trimmed utilizing No 15 scapel, to a smooth base with out incident. Patient tolerated this  well and reported comfort to the area of sub 1st and 5th MTPJ bilaterally    He will continue to monitor the areas daily, inspect her feet, wear protective shoe gear when ambulatory, moisturizer to maintain skin integrity and follow in this office in approximately  2-3 months, sooner p.r.n.

## 2020-02-06 ENCOUNTER — OFFICE VISIT (OUTPATIENT)
Dept: PODIATRY | Facility: CLINIC | Age: 75
End: 2020-02-06
Payer: MEDICARE

## 2020-02-06 VITALS
HEART RATE: 81 BPM | DIASTOLIC BLOOD PRESSURE: 57 MMHG | BODY MASS INDEX: 20.87 KG/M2 | SYSTOLIC BLOOD PRESSURE: 109 MMHG | WEIGHT: 149.06 LBS | HEIGHT: 71 IN

## 2020-02-06 DIAGNOSIS — L90.9 PLANTAR FAT PAD ATROPHY: ICD-10-CM

## 2020-02-06 DIAGNOSIS — B35.1 ONYCHOMYCOSIS DUE TO DERMATOPHYTE: ICD-10-CM

## 2020-02-06 DIAGNOSIS — L84 CORN OR CALLUS: ICD-10-CM

## 2020-02-06 DIAGNOSIS — M20.42 HAMMER TOES OF BOTH FEET: ICD-10-CM

## 2020-02-06 DIAGNOSIS — M20.11 HALLUX ABDUCTO VALGUS, RIGHT: ICD-10-CM

## 2020-02-06 DIAGNOSIS — E11.49 TYPE II DIABETES MELLITUS WITH NEUROLOGICAL MANIFESTATIONS: Primary | ICD-10-CM

## 2020-02-06 DIAGNOSIS — M20.12 HALLUX ABDUCTO VALGUS, LEFT: ICD-10-CM

## 2020-02-06 DIAGNOSIS — M20.41 HAMMER TOES OF BOTH FEET: ICD-10-CM

## 2020-02-06 PROCEDURE — 99499 NO LOS: ICD-10-PCS | Mod: S$GLB,,, | Performed by: PODIATRIST

## 2020-02-06 PROCEDURE — 11721 PR DEBRIDEMENT OF NAILS, 6 OR MORE: ICD-10-PCS | Mod: 59,Q9,S$GLB, | Performed by: PODIATRIST

## 2020-02-06 PROCEDURE — 11721 DEBRIDE NAIL 6 OR MORE: CPT | Mod: 59,Q9,S$GLB, | Performed by: PODIATRIST

## 2020-02-06 PROCEDURE — 11056 PARNG/CUTG B9 HYPRKR LES 2-4: CPT | Mod: Q9,S$GLB,, | Performed by: PODIATRIST

## 2020-02-06 PROCEDURE — 11056 PR TRIM BENIGN HYPERKERATOTIC SKIN LESION,2-4: ICD-10-PCS | Mod: Q9,S$GLB,, | Performed by: PODIATRIST

## 2020-02-06 PROCEDURE — 99999 PR PBB SHADOW E&M-EST. PATIENT-LVL III: CPT | Mod: PBBFAC,,, | Performed by: PODIATRIST

## 2020-02-06 PROCEDURE — 99999 PR PBB SHADOW E&M-EST. PATIENT-LVL III: ICD-10-PCS | Mod: PBBFAC,,, | Performed by: PODIATRIST

## 2020-02-06 PROCEDURE — 99499 UNLISTED E&M SERVICE: CPT | Mod: S$GLB,,, | Performed by: PODIATRIST

## 2020-02-06 RX ORDER — ERGOCALCIFEROL 1.25 MG/1
CAPSULE ORAL
COMMUNITY
Start: 2019-12-10

## 2020-02-06 RX ORDER — SYRINGE AND NEEDLE,INSULIN,1ML 30 G X3/8"
SYRINGE, EMPTY DISPOSABLE MISCELLANEOUS
COMMUNITY
Start: 2019-11-29

## 2020-02-06 RX ORDER — METOPROLOL SUCCINATE 25 MG/1
25 TABLET, EXTENDED RELEASE ORAL DAILY
COMMUNITY
Start: 2020-01-18

## 2020-02-06 NOTE — PROGRESS NOTES
"Subjective:      Patient ID: Avni Daniels is a 75 y.o. male.    Chief Complaint: Diabetes Mellitus and Nail Care    Avni is a 75 y.o. male who presents to the clinic for evaluation and treatment of high risk feet. Avni has a past medical history of Diabetes mellitus, type 2 and Hypertension. The patient's chief complaint is elongated, thickened toenails aggravated by increased weight bearing, shoe gear, pressure.  This patient has documented high risk feet requiring routine maintenance secondary to diabetes mellitis and those secondary complications of diabetes, as mentioned..    PCP: Reyes Gusman MD    Date Last Seen by PCP:   Chief Complaint   Patient presents with    Diabetes Mellitus    Nail Care       Current shoe gear:   Rx diabetic extra depth shoes and custom accommodative insoles      No results found for: HGBA1C        Patient Active Problem List   Diagnosis    Diabetic ulcer of left foot associated with type 2 diabetes mellitus    Non-pressure chronic ulcer of other part of left foot with fat layer exposed       Current Outpatient Medications on File Prior to Visit   Medication Sig Dispense Refill    aspirin 81 MG Chew Take 81 mg by mouth once daily.      atorvastatin (LIPITOR) 40 MG tablet       clopidogrel (PLAVIX) 75 mg tablet Take 75 mg by mouth once.       enalapril (VASOTEC) 5 MG tablet Take 5 mg by mouth once daily.       insulin NPH-insulin regular, 70/30, (NOVOLIN 70/30) 100 unit/mL (70-30) injection Inject 25 Units into the skin 2 (two) times daily.       insulin syringe-needle U-100 0.5 mL 31 gauge x 5/16 Syrg       insulin syringe-needle U-100 1/2 mL 30 gauge x 5/16 Syrg       metoprolol succinate (TOPROL-XL) 25 MG 24 hr tablet Take 25 mg by mouth once daily.      metoprolol tartrate (LOPRESSOR) 25 MG tablet Take 25 mg by mouth Daily.      THINPRO INSULIN SYRINGE 0.5 mL 31 x 3/8" Syrg       VITAMIN D2 1,250 mcg (50,000 unit) capsule TAKE 1 CAPSULE BY MOUTH ONCE EVERY MONTH  "     PACERONE 100 mg Tab Take 100 mg by mouth once daily.        No current facility-administered medications on file prior to visit.        Review of patient's allergies indicates:  No Known Allergies    Past Surgical History:   Procedure Laterality Date    APPENDECTOMY      GALLBLADDER SURGERY      TUMOR REMOVAL Left     foot       History reviewed. No pertinent family history.    Social History     Socioeconomic History    Marital status:      Spouse name: Not on file    Number of children: Not on file    Years of education: Not on file    Highest education level: Not on file   Occupational History    Not on file   Social Needs    Financial resource strain: Not on file    Food insecurity:     Worry: Not on file     Inability: Not on file    Transportation needs:     Medical: Not on file     Non-medical: Not on file   Tobacco Use    Smoking status: Former Smoker    Smokeless tobacco: Former User   Substance and Sexual Activity    Alcohol use: Not on file    Drug use: Not on file    Sexual activity: Not on file   Lifestyle    Physical activity:     Days per week: Not on file     Minutes per session: Not on file    Stress: Not on file   Relationships    Social connections:     Talks on phone: Not on file     Gets together: Not on file     Attends Jehovah's witness service: Not on file     Active member of club or organization: Not on file     Attends meetings of clubs or organizations: Not on file     Relationship status: Not on file   Other Topics Concern    Not on file   Social History Narrative    Not on file         Review of Systems   Constitution: Negative for chills and fever.   Cardiovascular: Negative for claudication and leg swelling.   Respiratory: Negative for cough and shortness of breath.    Skin: Positive for dry skin, nail changes and poor wound healing (hx of left foot ulcer). Negative for itching and rash.   Musculoskeletal: Positive for joint pain. Negative for falls, joint  "swelling and muscle weakness.   Gastrointestinal: Negative for diarrhea, nausea and vomiting.   Neurological: Positive for numbness and paresthesias. Negative for tremors and weakness.   Psychiatric/Behavioral: Negative for altered mental status and hallucinations.           Objective:       Vitals:    02/06/20 1426   BP: (!) 109/57   Pulse: 81   Weight: 67.6 kg (149 lb 0.5 oz)   Height: 5' 11" (1.803 m)   PainSc: 0-No pain       Physical Exam   Constitutional:  Non-toxic appearance. He does not have a sickly appearance. No distress.   Pt. is well-developed, well-nourished, appears stated age, in no acute distress, alert and oriented x 3. No evidence of depression, anxiety, or agitation. Calm, cooperative, and communicative. Appropriate interactions and affect.   Cardiovascular:   Pulses:       Dorsalis pedis pulses are 1+ on the right side, and 1+ on the left side.        Posterior tibial pulses are 1+ on the right side, and 1+ on the left side.   Dorsalis pedis and posterior tibial pulses are diminished Bilaterally. Toes are cool to touch. Feet are warm proximally.There is decreased digital hair. Skin is atrophic   Pulmonary/Chest: No respiratory distress.   Musculoskeletal:        Right ankle: No tenderness. No lateral malleolus, no medial malleolus, no AITFL, no CF ligament and no posterior TFL tenderness found. Achilles tendon exhibits no pain, no defect and normal Elias's test results.        Left ankle: No tenderness. No lateral malleolus, no medial malleolus, no AITFL, no CF ligament and no posterior TFL tenderness found. Achilles tendon exhibits no pain, no defect and normal Elias's test results.        Right foot: There is no tenderness and no bony tenderness.        Left foot: There is no tenderness and no bony tenderness.   Decreased stride, station of gait.  apropulsive toe off.  Increased angle and base of gait.    Patient has hammertoes of digits 2-5 bilateral partially reducible without symptom " today.    Visible and palpable bunion without pain at dorsomedial 1st metatarsal head right and left.  Hallux abducted right and left partially reducible, tracks laterally without being track bound.  No ecchymosis, erythema, edema, or cardinal signs infection or signs of trauma same foot.    Visible and palpable tailors bunion at dorsolateral 5th metatarsal head right and left.  5th digit is varus right and left partially reducible. No ecchymosis, erythema, edema, or cardinal signs infection.    Fat pad atrophy to heels and met heads bilateral     Lymphadenopathy:   No lymphatic streaking    Negative lymphadenopathy bilateral popliteal fossa and tarsal tunnel.     Neurological:   [place]am[place] 5.07 monofilamant testing is diminished Ryan feet. Decreased/absent vibratory sensation bilateral feet to 128Hz tuning fork.     Paresthesias, and hyperesthesia bilateral feet with no clearly identified trigger or source.           Skin: Skin is warm and dry. No abrasion, no burn, no ecchymosis, no laceration, no petechiae and no rash noted. He is not diaphoretic. No cyanosis. No pallor. Nails show no clubbing.   Skin is thin, atrophic, xerotic    Toenails 1-5 bilaterally are elongated by 2-3 mm, thickened by 2-3 mm, discolored/yellowed, dystrophic, brittle with subungual debris.    Focal hyperkeratotic lesion consisting entirely of hyperkeratotic tissue without open skin, drainage, pus, fluctuance, malodor, or signs of infection: sub 1st and 5th MTPJ ryan   Psychiatric: His mood appears not anxious. His affect is not inappropriate. His speech is not slurred. He is not combative. He is communicative. He is attentive.   Nursing note reviewed.        Assessment:       Encounter Diagnoses   Name Primary?    Type II diabetes mellitus with neurological manifestations Yes    Hammer toes of both feet     Hallux abducto valgus, left     Hallux abducto valgus, right     Plantar fat pad atrophy     Corn or callus      Onychomycosis due to dermatophyte          Plan:       Avni was seen today for diabetes mellitus and nail care.    Diagnoses and all orders for this visit:    Type II diabetes mellitus with neurological manifestations    Hammer toes of both feet    Hallux abducto valgus, left    Hallux abducto valgus, right    Plantar fat pad atrophy    Corn or callus    Onychomycosis due to dermatophyte      I counseled the patient on his conditions, their implications and medical management.    Shoe inspection. Diabetic Foot Education. Patient reminded of the importance of good nutrition and blood sugar control to help prevent podiatric complications of diabetes. Patient instructed on proper foot hygeine. We discussed wearing proper shoe gear, daily foot inspections, never walking without protective shoe gear, never putting sharp instruments to feet.      - With patient's permission, nails were aggressively reduced and debrided x 10 to their soft tissue attachment mechanically and with electric , removing all offending nail and debris. Patient relates relief following the procedure.     After cleansing the  area w/ alcohol prep pad the above mentioned hyperkeratosis was trimmed utilizing No 15 scapel, to a smooth base with out incident. Patient tolerated this  well and reported comfort to the area of sub 1st and 5th MTPJ bilaterally    He will continue to monitor the areas daily, inspect her feet, wear protective shoe gear when ambulatory, moisturizer to maintain skin integrity and follow in this office in approximately  2-3 months, sooner p.r.n.

## 2020-02-06 NOTE — PATIENT INSTRUCTIONS
Recommend lotions: eucerin, eucerin for diabetics, aquaphor, A&D ointment, gold bond for diabetics, sween, Jeanerette's Bees all purpose baby ointment,  urea 40 with aloe (found on amazon.com)    Shoe recommendations: (try 6pm.com, zappos.com , nordstromrack.Plantiga, or shoes.[email or web address] for discounted prices) you can visit [employer]SW shoes in [place]  or [employer] in the [facility] (there are also several shoe brand outlets in the [facility])    Asics (GT 2000 or gel foundations), new balance stability type shoes, saucony (stabil c3),  Drew (GTS or Beast or transcend), propet (tennis shoe)    Sofft Brand or axxiom (women) Donna&Mich (men), clarks, crocs, aerosoles, naturalizers, SAS, ecco, born, ant good, rockports (dress shoes)    Vionic, burkenstocks, fitflops, propet (sandals)  Nike comfort thong sandals, crocs, propet (house shoes)    Nail Home remedy:  Vicks Vapor rub to nails for easier manageability    Diabetes: Inspecting Your Feet  Diabetes increases your chances of developing foot problems. So inspect your feet every day. This helps you find small skin irritations before they become serious infections. If you have trouble seeing the bottoms of your feet, use a mirror or ask a family member or friend to help.     Pressure spots on the bottom of the foot are common areas where problems develop.   How to check your feet  Below are tips to help you look for foot problems. Try to check your feet at the same time each day, such as when you get out of bed in the morning:  · Check the top of each foot. The tops of toes, back of the heel, and outer edge of the foot can get a lot of rubbing from poor-fitting shoes.  · Check the bottom of each foot. Daily wear and tear often leads to problems at pressure spots.  · Check the toes and nails. Fungal infections often occur between toes. Toenail problems can also be a sign of fungal infections or lead to breaks in the skin.  · Check your shoes, too. Loose objects inside a shoe can  injure the foot. Use your hand to feel inside your shoes for things like robert, loose stitching, or rough areas that could irritate your skin.  Warning signs  Look for any color changes in the foot. Redness with streaks can signal a severe infection, which needs immediate medical attention. Tell your doctor right away if you have any of these problems:  · Swelling, sometimes with color changes, may be a sign of poor blood flow or infection. Symptoms include tenderness and an increase in the size of your foot.  · Warm or hot areas on your feet may be signs of infection. A foot that is cold may not be getting enough blood.  · Sensations such as burning, tingling, or pins and needles can be signs of a problem. Also check for areas that may be numb.  · Hot spots are caused by friction or pressure. Look for hot spots in areas that get a lot of rubbing. Hot spots can turn into blisters, calluses, or sores.  · Cracks and sores are caused by dry or irritated skin. They are a sign that the skin is breaking down, which can lead to infection.  · Toenail problems to watch for include nails growing into the skin (ingrown toenail) and causing redness or pain. Thick, yellow, or discolored nails can signal a fungal infection.  · Drainage and odor can develop from untreated sores and ulcers. Call your doctor right away if you notice white or yellow drainage, bleeding, or unpleasant odor.   © 5854-9916 [employer]. [address] [place] [address]. All rights reserved. This information is not intended as a substitute for professional medical care. Always follow your healthcare professional's instructions.        Step-by-Step:  Inspecting Your Feet (Diabetes)    Date Last Reviewed: 10/1/2016  © 0529-2094 [employer]. [address] [place] [address]. All rights reserved. This information is not intended as a substitute for professional medical care. Always follow your healthcare  professional's instructions.

## 2020-07-07 ENCOUNTER — OFFICE VISIT (OUTPATIENT)
Dept: PODIATRY | Facility: CLINIC | Age: 75
End: 2020-07-07
Payer: MEDICARE

## 2020-07-07 VITALS
SYSTOLIC BLOOD PRESSURE: 129 MMHG | BODY MASS INDEX: 20.87 KG/M2 | WEIGHT: 149.06 LBS | TEMPERATURE: 84 F | HEIGHT: 71 IN | DIASTOLIC BLOOD PRESSURE: 74 MMHG

## 2020-07-07 DIAGNOSIS — E11.49 TYPE II DIABETES MELLITUS WITH NEUROLOGICAL MANIFESTATIONS: Primary | ICD-10-CM

## 2020-07-07 DIAGNOSIS — M20.11 HALLUX ABDUCTO VALGUS, RIGHT: ICD-10-CM

## 2020-07-07 DIAGNOSIS — M20.12 HALLUX ABDUCTO VALGUS, LEFT: ICD-10-CM

## 2020-07-07 DIAGNOSIS — B35.1 ONYCHOMYCOSIS DUE TO DERMATOPHYTE: ICD-10-CM

## 2020-07-07 DIAGNOSIS — M20.42 HAMMER TOES OF BOTH FEET: ICD-10-CM

## 2020-07-07 DIAGNOSIS — L84 CORN OR CALLUS: ICD-10-CM

## 2020-07-07 DIAGNOSIS — M20.41 HAMMER TOES OF BOTH FEET: ICD-10-CM

## 2020-07-07 DIAGNOSIS — L90.9 PLANTAR FAT PAD ATROPHY: ICD-10-CM

## 2020-07-07 PROCEDURE — 99499 NO LOS: ICD-10-PCS | Mod: S$GLB,,, | Performed by: PODIATRIST

## 2020-07-07 PROCEDURE — 11056 PARNG/CUTG B9 HYPRKR LES 2-4: CPT | Mod: Q9,S$GLB,, | Performed by: PODIATRIST

## 2020-07-07 PROCEDURE — 11056 PR TRIM BENIGN HYPERKERATOTIC SKIN LESION,2-4: ICD-10-PCS | Mod: Q9,S$GLB,, | Performed by: PODIATRIST

## 2020-07-07 PROCEDURE — 99999 PR PBB SHADOW E&M-EST. PATIENT-LVL IV: CPT | Mod: PBBFAC,,, | Performed by: PODIATRIST

## 2020-07-07 PROCEDURE — 11721 DEBRIDE NAIL 6 OR MORE: CPT | Mod: 59,Q9,S$GLB, | Performed by: PODIATRIST

## 2020-07-07 PROCEDURE — 99499 UNLISTED E&M SERVICE: CPT | Mod: S$GLB,,, | Performed by: PODIATRIST

## 2020-07-07 PROCEDURE — 11721 PR DEBRIDEMENT OF NAILS, 6 OR MORE: ICD-10-PCS | Mod: 59,Q9,S$GLB, | Performed by: PODIATRIST

## 2020-07-07 PROCEDURE — 99999 PR PBB SHADOW E&M-EST. PATIENT-LVL IV: ICD-10-PCS | Mod: PBBFAC,,, | Performed by: PODIATRIST

## 2020-07-07 NOTE — PROGRESS NOTES
Subjective:      Patient ID: Avni Daniels is a 75 y.o. male.    Chief Complaint: Diabetes Mellitus (ov Dr Gusman PCP 7/7/20) and Nail Care    Avni is a 75 y.o. male who presents to the clinic for evaluation and treatment of high risk feet. Avni has a past medical history of Diabetes mellitus, type 2 and Hypertension. The patient's chief complaint is elongated, thickened toenails aggravated by increased weight bearing, shoe gear, pressure.  This patient has documented high risk feet requiring routine maintenance secondary to diabetes mellitis and those secondary complications of diabetes, as mentioned..    PCP: Reyes Gusman MD    Date Last Seen by PCP:   Chief Complaint   Patient presents with    Diabetes Mellitus     ov Dr Gusman PCP 7/7/20    Nail Care       Current shoe gear:   Rx diabetic extra depth shoes and custom accommodative insoles, worn      Hemoglobin A1c   Date Value Ref Range Status   03/05/2020 7.9 (H) <5.7 % of total Hgb Final     Comment:     For someone without known diabetes, a hemoglobin A1c  value of 6.5% or greater indicates that they may have   diabetes and this should be confirmed with a follow-up   test.    For someone with known diabetes, a value <7% indicates   that their diabetes is well controlled and a value   greater than or equal to 7% indicates suboptimal   control. A1c targets should be individualized based on   duration of diabetes, age, comorbid conditions, and   other considerations.    Currently, no consensus exists regarding use of  hemoglobin A1c for diagnosis of diabetes for children.               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      atorvastatin (LIPITOR) 40 MG  "tablet       clopidogrel (PLAVIX) 75 mg tablet Take 75 mg by mouth once.       enalapril (VASOTEC) 5 MG tablet Take 5 mg by mouth once daily.       insulin NPH-insulin regular, 70/30, (NOVOLIN 70/30) 100 unit/mL (70-30) injection Inject 25 Units into the skin 2 (two) times daily.       insulin syringe-needle U-100 0.5 mL 31 gauge x 5/16 Syrg       insulin syringe-needle U-100 1/2 mL 30 gauge x 5/16 Syrg       metoprolol succinate (TOPROL-XL) 25 MG 24 hr tablet Take 25 mg by mouth once daily.      metoprolol tartrate (LOPRESSOR) 25 MG tablet Take 25 mg by mouth Daily.      PACERONE 100 mg Tab Take 100 mg by mouth once daily.       THINPRO INSULIN SYRINGE 0.5 mL 31 x 3/8" Syrg       VITAMIN D2 1,250 mcg (50,000 unit) capsule TAKE 1 CAPSULE BY MOUTH ONCE EVERY MONTH       No current facility-administered medications on file prior to visit.        Review of patient's allergies indicates:  No Known Allergies    Past Surgical History:   Procedure Laterality Date    APPENDECTOMY      GALLBLADDER SURGERY      TUMOR REMOVAL Left     foot       History reviewed. No pertinent family history.    Social History     Socioeconomic History    Marital status:      Spouse name: Not on file    Number of children: Not on file    Years of education: Not on file    Highest education level: Not on file   Occupational History    Not on file   Social Needs    Financial resource strain: Not on file    Food insecurity     Worry: Not on file     Inability: Not on file    Transportation needs     Medical: Not on file     Non-medical: Not on file   Tobacco Use    Smoking status: Former Smoker    Smokeless tobacco: Former User   Substance and Sexual Activity    Alcohol use: Not on file    Drug use: Not on file    Sexual activity: Not on file   Lifestyle    Physical activity     Days per week: Not on file     Minutes per session: Not on file    Stress: Not on file   Relationships    Social connections     Talks " "on phone: Not on file     Gets together: Not on file     Attends Rastafarian service: Not on file     Active member of club or organization: Not on file     Attends meetings of clubs or organizations: Not on file     Relationship status: Not on file   Other Topics Concern    Not on file   Social History Narrative    Not on file         Review of Systems   Constitution: Negative for chills and fever.   Cardiovascular: Negative for claudication and leg swelling.   Respiratory: Negative for cough and shortness of breath.    Skin: Positive for dry skin, nail changes and poor wound healing (hx of left foot ulcer). Negative for itching and rash.   Musculoskeletal: Positive for joint pain. Negative for falls, joint swelling and muscle weakness.   Gastrointestinal: Negative for diarrhea, nausea and vomiting.   Neurological: Positive for numbness and paresthesias. Negative for tremors and weakness.   Psychiatric/Behavioral: Negative for altered mental status and hallucinations.           Objective:       Vitals:    07/07/20 1410   BP: 129/74   Temp: (!) 84 °F (28.9 °C)   Weight: 67.6 kg (149 lb 0.5 oz)   Height: 5' 11" (1.803 m)   PainSc: 0-No pain       Physical Exam   Constitutional:  Non-toxic appearance. He does not have a sickly appearance. No distress.   Pt. is well-developed, well-nourished, appears stated age, in no acute distress, alert and oriented x 3. No evidence of depression, anxiety, or agitation. Calm, cooperative, and communicative. Appropriate interactions and affect.   Cardiovascular:   Pulses:       Dorsalis pedis pulses are 1+ on the right side, and 1+ on the left side.        Posterior tibial pulses are 1+ on the right side, and 1+ on the left side.   Dorsalis pedis and posterior tibial pulses are diminished Bilaterally. Toes are cool to touch. Feet are warm proximally.There is decreased digital hair. Skin is atrophic   Pulmonary/Chest: No respiratory distress.   Musculoskeletal:        Right ankle: No " tenderness. No lateral malleolus, no medial malleolus, no AITFL, no CF ligament and no posterior TFL tenderness found. Achilles tendon exhibits no pain, no defect and normal Elias's test results.        Left ankle: No tenderness. No lateral malleolus, no medial malleolus, no AITFL, no CF ligament and no posterior TFL tenderness found. Achilles tendon exhibits no pain, no defect and normal Elias's test results.        Right foot: There is no tenderness and no bony tenderness.        Left foot: There is no tenderness and no bony tenderness.   Decreased stride, station of gait.  apropulsive toe off.  Increased angle and base of gait.    Patient has hammertoes of digits 2-5 bilateral partially reducible without symptom today.    Visible and palpable bunion without pain at dorsomedial 1st metatarsal head right and left.  Hallux abducted right and left partially reducible, tracks laterally without being track bound.  No ecchymosis, erythema, edema, or cardinal signs infection or signs of trauma same foot.    Visible and palpable tailors bunion at dorsolateral 5th metatarsal head right and left.  5th digit is varus right and left partially reducible. No ecchymosis, erythema, edema, or cardinal signs infection.    Fat pad atrophy to heels and met heads bilateral     Lymphadenopathy:   No lymphatic streaking    Negative lymphadenopathy bilateral popliteal fossa and tarsal tunnel.     Neurological:   Melba-Tamanna 5.07 monofilamant testing is diminished Ryan feet. Decreased/absent vibratory sensation bilateral feet to 128Hz tuning fork.     Paresthesias, and hyperesthesia bilateral feet with no clearly identified trigger or source.           Skin: Skin is warm and dry. No abrasion, no burn, no ecchymosis, no laceration, no petechiae and no rash noted. He is not diaphoretic. No cyanosis. No pallor. Nails show no clubbing.   Skin is thin, atrophic, xerotic    Toenails 1-5 bilaterally are elongated by 2-3 mm, thickened  by 2-3 mm, discolored/yellowed, dystrophic, brittle with subungual debris.    Focal hyperkeratotic lesion consisting entirely of hyperkeratotic tissue without open skin, drainage, pus, fluctuance, malodor, or signs of infection: sub 1st and 5th MTPJ luann   Psychiatric: His mood appears not anxious. His affect is not inappropriate. His speech is not slurred. He is not combative. He is communicative. He is attentive.   Nursing note reviewed.        Assessment:       Encounter Diagnoses   Name Primary?    Type II diabetes mellitus with neurological manifestations Yes    Hammer toes of both feet     Hallux abducto valgus, left     Hallux abducto valgus, right     Plantar fat pad atrophy     Corn or callus     Onychomycosis due to dermatophyte          Plan:       Avni was seen today for diabetes mellitus and nail care.    Diagnoses and all orders for this visit:    Type II diabetes mellitus with neurological manifestations  -     DIABETIC SHOES FOR HOME USE    Hammer toes of both feet  -     DIABETIC SHOES FOR HOME USE    Hallux abducto valgus, left  -     DIABETIC SHOES FOR HOME USE    Hallux abducto valgus, right  -     DIABETIC SHOES FOR HOME USE    Plantar fat pad atrophy  -     DIABETIC SHOES FOR HOME USE    Corn or callus  -     DIABETIC SHOES FOR HOME USE    Onychomycosis due to dermatophyte      I counseled the patient on his conditions, their implications and medical management.    Shoe inspection. Diabetic Foot Education. Patient reminded of the importance of good nutrition and blood sugar control to help prevent podiatric complications of diabetes. Patient instructed on proper foot hygeine. We discussed wearing proper shoe gear, daily foot inspections, never walking without protective shoe gear, never putting sharp instruments to feet.      Rx diabetic shoes for protection and support    With patient's permission, nails were aggressively reduced and debrided x 10 to their soft tissue attachment  mechanically and with electric , removing all offending nail and debris. Patient relates relief following the procedure.     After cleansing the  area w/ alcohol prep pad the above mentioned hyperkeratosis was trimmed utilizing No 15 scapel, to a smooth base with out incident. Patient tolerated this  well and reported comfort to the area of sub 1st and 5th MTPJ bilaterally    He will continue to monitor the areas daily, inspect her feet, wear protective shoe gear when ambulatory, moisturizer to maintain skin integrity and follow in this office in approximately  2-3 months, sooner p.r.n.

## 2020-07-07 NOTE — PATIENT INSTRUCTIONS
Recommend lotions: eucerin, eucerin for diabetics, aquaphor, A&D ointment, gold bond for diabetics, sween, [place]'s Bees all purpose baby ointment,  urea 40 with aloe (found on amazon.com)    Shoe recommendations: (try 6pm.com, zappos.com , nordstromrack.Hyperpot, or shoes.Hyperpot for discounted prices) you can visit [employer] shoes in [place]  or [employer]DLT.com in the [facility] (there are also several shoe brand outlets in the [facility])    Asics (GT 2000 or gel foundations), new balance stability type shoes, saucony (stabil c3),  Drew (GTS or Beast or transcend), propet (tennis shoe)    Sofft Brand or axxiom (women) Donna&Mich (men), clarks, crocs, aerosoles, naturalizers, SAS, ecco, born, atn good, rockports (dress shoes)    Vionic, burkenstocks, fitflops, propet (sandals)  Nike comfort thong sandals, crocs, propet (house shoes)    Nail Home remedy:  Vicks Vapor rub to nails for easier manageability    Diabetes: Inspecting Your Feet  Diabetes increases your chances of developing foot problems. So inspect your feet every day. This helps you find small skin irritations before they become serious infections. If you have trouble seeing the bottoms of your feet, use a mirror or ask a family member or friend to help.     Pressure spots on the bottom of the foot are common areas where problems develop.   How to check your feet  Below are tips to help you look for foot problems. Try to check your feet at the same time each day, such as when you get out of bed in the morning:  · Check the top of each foot. The tops of toes, back of the heel, and outer edge of the foot can get a lot of rubbing from poor-fitting shoes.  · Check the bottom of each foot. Daily wear and tear often leads to problems at pressure spots.  · Check the toes and nails. Fungal infections often occur between toes. Toenail problems can also be a sign of fungal infections or lead to breaks in the skin.  · Check your shoes, too. Loose objects inside a shoe can  injure the foot. Use your hand to feel inside your shoes for things like robert, loose stitching, or rough areas that could irritate your skin.  Warning signs  Look for any color changes in the foot. Redness with streaks can signal a severe infection, which needs immediate medical attention. Tell your doctor right away if you have any of these problems:  · Swelling, sometimes with color changes, may be a sign of poor blood flow or infection. Symptoms include tenderness and an increase in the size of your foot.  · Warm or hot areas on your feet may be signs of infection. A foot that is cold may not be getting enough blood.  · Sensations such as burning, tingling, or pins and needles can be signs of a problem. Also check for areas that may be numb.  · Hot spots are caused by friction or pressure. Look for hot spots in areas that get a lot of rubbing. Hot spots can turn into blisters, calluses, or sores.  · Cracks and sores are caused by dry or irritated skin. They are a sign that the skin is breaking down, which can lead to infection.  · Toenail problems to watch for include nails growing into the skin (ingrown toenail) and causing redness or pain. Thick, yellow, or discolored nails can signal a fungal infection.  · Drainage and odor can develop from untreated sores and ulcers. Call your doctor right away if you notice white or yellow drainage, bleeding, or unpleasant odor.   © 7915-6681 [employer]. [address] [place] [address]. All rights reserved. This information is not intended as a substitute for professional medical care. Always follow your healthcare professional's instructions.        Step-by-Step:  Inspecting Your Feet (Diabetes)    Date Last Reviewed: 10/1/2016  © 3237-6966 [employer]. [address] [place] [id number]. All rights reserved. This information is not intended as a substitute for professional medical care. Always follow your healthcare  professional's instructions.

## 2020-09-24 ENCOUNTER — OFFICE VISIT (OUTPATIENT)
Dept: PODIATRY | Facility: CLINIC | Age: 75
End: 2020-09-24
Payer: MEDICARE

## 2020-09-24 VITALS
DIASTOLIC BLOOD PRESSURE: 68 MMHG | BODY MASS INDEX: 20.86 KG/M2 | WEIGHT: 149 LBS | HEIGHT: 71 IN | SYSTOLIC BLOOD PRESSURE: 128 MMHG

## 2020-09-24 DIAGNOSIS — M20.41 HAMMER TOES OF BOTH FEET: ICD-10-CM

## 2020-09-24 DIAGNOSIS — M20.42 HAMMER TOES OF BOTH FEET: ICD-10-CM

## 2020-09-24 DIAGNOSIS — L90.9 PLANTAR FAT PAD ATROPHY: ICD-10-CM

## 2020-09-24 DIAGNOSIS — M20.11 HALLUX ABDUCTO VALGUS, RIGHT: ICD-10-CM

## 2020-09-24 DIAGNOSIS — B35.1 ONYCHOMYCOSIS DUE TO DERMATOPHYTE: ICD-10-CM

## 2020-09-24 DIAGNOSIS — L84 CORN OR CALLUS: ICD-10-CM

## 2020-09-24 DIAGNOSIS — M20.12 HALLUX ABDUCTO VALGUS, LEFT: ICD-10-CM

## 2020-09-24 DIAGNOSIS — E11.49 TYPE II DIABETES MELLITUS WITH NEUROLOGICAL MANIFESTATIONS: Primary | ICD-10-CM

## 2020-09-24 PROCEDURE — 11721 PR DEBRIDEMENT OF NAILS, 6 OR MORE: ICD-10-PCS | Mod: 59,Q9,S$GLB, | Performed by: PODIATRIST

## 2020-09-24 PROCEDURE — 99499 NO LOS: ICD-10-PCS | Mod: S$GLB,,, | Performed by: PODIATRIST

## 2020-09-24 PROCEDURE — 99999 PR PBB SHADOW E&M-EST. PATIENT-LVL III: CPT | Mod: PBBFAC,,, | Performed by: PODIATRIST

## 2020-09-24 PROCEDURE — 11056 PR TRIM BENIGN HYPERKERATOTIC SKIN LESION,2-4: ICD-10-PCS | Mod: Q9,S$GLB,, | Performed by: PODIATRIST

## 2020-09-24 PROCEDURE — 99499 UNLISTED E&M SERVICE: CPT | Mod: S$GLB,,, | Performed by: PODIATRIST

## 2020-09-24 PROCEDURE — 99999 PR PBB SHADOW E&M-EST. PATIENT-LVL III: ICD-10-PCS | Mod: PBBFAC,,, | Performed by: PODIATRIST

## 2020-09-24 PROCEDURE — 11721 DEBRIDE NAIL 6 OR MORE: CPT | Mod: 59,Q9,S$GLB, | Performed by: PODIATRIST

## 2020-09-24 PROCEDURE — 11056 PARNG/CUTG B9 HYPRKR LES 2-4: CPT | Mod: Q9,S$GLB,, | Performed by: PODIATRIST

## 2020-09-24 NOTE — PROGRESS NOTES
Subjective:      Patient ID: Avni Daniels is a 75 y.o. male.    Chief Complaint: Diabetes Mellitus (PCP  07/07/2020), Nail Care, Callouses, Foot Problem, and Foot Pain    Avni is a 75 y.o. male who presents to the clinic for evaluation and treatment of high risk feet. Avni has a past medical history of Diabetes mellitus, type 2 and Hypertension. The patient's chief complaint is painful left foot callus and elongated, thickened toenails aggravated by increased weight bearing, shoe gear, pressure.  This patient has documented high risk feet requiring routine maintenance secondary to diabetes mellitis and those secondary complications of diabetes, as mentioned..    PCP: Reyes Gusman MD    Date Last Seen by PCP:   Chief Complaint   Patient presents with    Diabetes Mellitus     PCP  07/07/2020    Nail Care    Callouses    Foot Problem    Foot Pain       Current shoe gear:  Boat shoes      Hemoglobin A1c   Date Value Ref Range Status   07/07/2020 8.3 (H) <5.7 % of total Hgb Final     Comment:     For someone without known diabetes, a hemoglobin A1c  value of 6.5% or greater indicates that they may have   diabetes and this should be confirmed with a follow-up   test.    For someone with known diabetes, a value <7% indicates   that their diabetes is well controlled and a value   greater than or equal to 7% indicates suboptimal   control. A1c targets should be individualized based on   duration of diabetes, age, comorbid conditions, and   other considerations.    Currently, no consensus exists regarding use of  hemoglobin A1c for diagnosis of diabetes for children.       03/05/2020 7.9 (H) <5.7 % of total Hgb Final     Comment:     For someone without known diabetes, a hemoglobin A1c  value of 6.5% or greater indicates that they may have   diabetes and this should be confirmed with a follow-up   test.    For someone with known diabetes, a value <7% indicates   that their diabetes is well  "controlled and a value   greater than or equal to 7% indicates suboptimal   control. A1c targets should be individualized based on   duration of diabetes, age, comorbid conditions, and   other considerations.    Currently, no consensus exists regarding use of  hemoglobin A1c for diagnosis of diabetes for children.               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      atorvastatin (LIPITOR) 40 MG tablet       clopidogrel (PLAVIX) 75 mg tablet Take 75 mg by mouth once.       enalapril (VASOTEC) 5 MG tablet Take 5 mg by mouth once daily.       insulin NPH-insulin regular, 70/30, (NOVOLIN 70/30) 100 unit/mL (70-30) injection Inject 25 Units into the skin 2 (two) times daily.       insulin syringe-needle U-100 0.5 mL 31 gauge x 5/16 Syrg       insulin syringe-needle U-100 1/2 mL 30 gauge x 5/16 Syrg       metoprolol succinate (TOPROL-XL) 25 MG 24 hr tablet Take 25 mg by mouth once daily.      metoprolol tartrate (LOPRESSOR) 25 MG tablet Take 25 mg by mouth Daily.      PACERONE 100 mg Tab Take 100 mg by mouth once daily.       THINPRO INSULIN SYRINGE 0.5 mL 31 x 3/8" Syrg       VITAMIN D2 1,250 mcg (50,000 unit) capsule TAKE 1 CAPSULE BY MOUTH ONCE EVERY MONTH       No current facility-administered medications on file prior to visit.        Review of patient's allergies indicates:  No Known Allergies    Past Surgical History:   Procedure Laterality Date    APPENDECTOMY      GALLBLADDER SURGERY      TUMOR REMOVAL Left     foot       History reviewed. No pertinent family history.    Social History     Socioeconomic History    Marital status:      Spouse name: Not on file    Number of children: Not " "on file    Years of education: Not on file    Highest education level: Not on file   Occupational History    Not on file   Social Needs    Financial resource strain: Not on file    Food insecurity     Worry: Not on file     Inability: Not on file    Transportation needs     Medical: Not on file     Non-medical: Not on file   Tobacco Use    Smoking status: Former Smoker    Smokeless tobacco: Former User   Substance and Sexual Activity    Alcohol use: Not on file    Drug use: Not on file    Sexual activity: Not on file   Lifestyle    Physical activity     Days per week: Not on file     Minutes per session: Not on file    Stress: Not on file   Relationships    Social connections     Talks on phone: Not on file     Gets together: Not on file     Attends [employer] service: Not on file     Active member of club or organization: Not on file     Attends meetings of clubs or organizations: Not on file     Relationship status: Not on file   Other Topics Concern    Not on file   Social History Narrative    Not on file         Review of Systems   Constitution: Negative for chills and fever.   Cardiovascular: Negative for claudication and leg swelling.   Respiratory: Negative for cough and shortness of breath.    Skin: Positive for dry skin, nail changes and poor wound healing (hx of left foot ulcer). Negative for itching and rash.   Musculoskeletal: Positive for joint pain. Negative for falls, joint swelling and muscle weakness.   Gastrointestinal: Negative for diarrhea, nausea and vomiting.   Neurological: Positive for numbness and paresthesias. Negative for tremors and weakness.   Psychiatric/Behavioral: Negative for altered mental status and hallucinations.           Objective:       Vitals:    09/24/20 1426   BP: 128/68   Weight: 67.6 kg (149 lb)   Height: 5' 11" (1.803 m)   PainSc:   3   PainLoc: Foot       Physical Exam   Constitutional:  Non-toxic appearance. He does not have a sickly appearance. No " distress.   Pt. is well-developed, well-nourished, appears stated age, in no acute distress, alert and oriented x 3. No evidence of depression, anxiety, or agitation. Calm, cooperative, and communicative. Appropriate interactions and affect.   Cardiovascular:   Pulses:       Dorsalis pedis pulses are 1+ on the right side, and 1+ on the left side.        Posterior tibial pulses are 1+ on the right side, and 1+ on the left side.   Dorsalis pedis and posterior tibial pulses are diminished Bilaterally. Toes are cool to touch. Feet are warm proximally.There is decreased digital hair. Skin is atrophic   Pulmonary/Chest: No respiratory distress.   Musculoskeletal:        Right ankle: No tenderness. No lateral malleolus, no medial malleolus, no AITFL, no CF ligament and no posterior TFL tenderness found. Achilles tendon exhibits no pain, no defect and normal Elias's test results.        Left ankle: No tenderness. No lateral malleolus, no medial malleolus, no AITFL, no CF ligament and no posterior TFL tenderness found. Achilles tendon exhibits no pain, no defect and normal Elias's test results.        Right foot: There is no tenderness and no bony tenderness.        Left foot: There is no tenderness and no bony tenderness.   Decreased stride, station of gait.  apropulsive toe off.  Increased angle and base of gait.    Patient has hammertoes of digits 2-5 bilateral partially reducible without symptom today.    Visible and palpable bunion without pain at dorsomedial 1st metatarsal head right and left.  Hallux abducted right and left partially reducible, tracks laterally without being track bound.  No ecchymosis, erythema, edema, or cardinal signs infection or signs of trauma same foot.    Visible and palpable tailors bunion at dorsolateral 5th metatarsal head right and left.  5th digit is varus right and left partially reducible. No ecchymosis, erythema, edema, or cardinal signs infection.    Fat pad atrophy to heels and  met heads bilateral     Lymphadenopathy:   No lymphatic streaking    Negative lymphadenopathy bilateral popliteal fossa and tarsal tunnel.     Neurological:   [place] 5.07 monofilamant testing is diminished Ryan feet. Decreased/absent vibratory sensation bilateral feet to 128Hz tuning fork.     Paresthesias, and hyperesthesia bilateral feet with no clearly identified trigger or source.           Skin: Skin is warm and dry. No abrasion, no burn, no ecchymosis, no laceration, no petechiae and no rash noted. He is not diaphoretic. No cyanosis. No pallor. Nails show no clubbing.   Skin is thin, atrophic, xerotic    Toenails 1-5 bilaterally are elongated by 2-3 mm, thickened by 2-3 mm, discolored/yellowed, dystrophic, brittle with subungual debris.    Focal hyperkeratotic lesion with painful central core consisting entirely of hyperkeratotic tissue without open skin, drainage, pus, fluctuance, malodor, or signs of infection:  sub 1st and 5th MTPJ ryan     Psychiatric: His mood appears not anxious. His affect is not inappropriate. His speech is not slurred. He is not combative. He is communicative. He is attentive.   Nursing note reviewed.        Assessment:       Encounter Diagnoses   Name Primary?    Type II diabetes mellitus with neurological manifestations Yes    Hammer toes of both feet     Hallux abducto valgus, left     Hallux abducto valgus, right     Plantar fat pad atrophy     Corn or callus     Onychomycosis due to dermatophyte          Plan:       Avni was seen today for diabetes mellitus, nail care, callouses, foot problem and foot pain.    Diagnoses and all orders for this visit:    Type II diabetes mellitus with neurological manifestations    Hammer toes of both feet    Hallux abducto valgus, left    Hallux abducto valgus, right    Plantar fat pad atrophy    Corn or callus    Onychomycosis due to dermatophyte      I counseled the patient on his conditions, their implications and medical  management.    Shoe inspection. Diabetic Foot Education. Patient reminded of the importance of good nutrition and blood sugar control to help prevent podiatric complications of diabetes. Patient instructed on proper foot hygeine. We discussed wearing proper shoe gear, daily foot inspections, never walking without protective shoe gear, never putting sharp instruments to feet.      With patient's permission, nails were aggressively reduced and debrided x 10 to their soft tissue attachment mechanically and with electric , removing all offending nail and debris. Patient relates relief following the procedure.     After cleansing the  area w/ alcohol prep pad the above mentioned hyperkeratosis was trimmed utilizing No 15 scapel, to a smooth base with out incident. Patient tolerated this  well and reported comfort to the area of sub 1st and 5th MTPJ bilaterally    He will continue to monitor the areas daily, inspect her feet, wear protective shoe gear when ambulatory, moisturizer to maintain skin integrity and follow in this office in approximately  2-3 months, sooner p.r.n.

## 2020-09-24 NOTE — PATIENT INSTRUCTIONS
Recommend lotions: eucerin, eucerin for diabetics, aquaphor, A&D ointment, gold bond for diabetics, sween, Coxsackie's Bees all purpose baby ointment,  urea 40 with aloe (found on amazon.com)    Shoe recommendations: (try 6pm.com, zappos.com , nordstromrack.BetterLesson, or shoes.BetterLesson for discounted prices) you can visit [employer] shoes in [place]  or [employer] in the [facility] (there are also several shoe brand outlets in the [facility])    Asics (GT 2000 or gel foundations), new balance stability type shoes, saucony (stabil c3),  Drew (GTS or Beast or transcend), propet (tennis shoe)    Sofft Brand or axxiom (women) Donna&Mich (men), clarks, crocs, aerosoles, naturalizers, SAS, ecco, born, ant good, rockports (dress shoes)    Vionic, burkenstocks, fitflops, propet (sandals)  Nike comfort thong sandals, crocs, propet (house shoes)    Nail Home remedy:  Vicks Vapor rub to nails for easier manageability    Diabetes: Inspecting Your Feet  Diabetes increases your chances of developing foot problems. So inspect your feet every day. This helps you find small skin irritations before they become serious infections. If you have trouble seeing the bottoms of your feet, use a mirror or ask a family member or friend to help.     Pressure spots on the bottom of the foot are common areas where problems develop.   How to check your feet  Below are tips to help you look for foot problems. Try to check your feet at the same time each day, such as when you get out of bed in the morning:  · Check the top of each foot. The tops of toes, back of the heel, and outer edge of the foot can get a lot of rubbing from poor-fitting shoes.  · Check the bottom of each foot. Daily wear and tear often leads to problems at pressure spots.  · Check the toes and nails. Fungal infections often occur between toes. Toenail problems can also be a sign of fungal infections or lead to breaks in the skin.  · Check your shoes, too. Loose objects inside a shoe can  injure the foot. Use your hand to feel inside your shoes for things like robert, loose stitching, or rough areas that could irritate your skin.  Warning signs  Look for any color changes in the foot. Redness with streaks can signal a severe infection, which needs immediate medical attention. Tell your doctor right away if you have any of these problems:  · Swelling, sometimes with color changes, may be a sign of poor blood flow or infection. Symptoms include tenderness and an increase in the size of your foot.  · Warm or hot areas on your feet may be signs of infection. A foot that is cold may not be getting enough blood.  · Sensations such as burning, tingling, or pins and needles can be signs of a problem. Also check for areas that may be numb.  · Hot spots are caused by friction or pressure. Look for hot spots in areas that get a lot of rubbing. Hot spots can turn into blisters, calluses, or sores.  · Cracks and sores are caused by dry or irritated skin. They are a sign that the skin is breaking down, which can lead to infection.  · Toenail problems to watch for include nails growing into the skin (ingrown toenail) and causing redness or pain. Thick, yellow, or discolored nails can signal a fungal infection.  · Drainage and odor can develop from untreated sores and ulcers. Call your doctor right away if you notice white or yellow drainage, bleeding, or unpleasant odor.   © 4778-4119 [employer]. [address] [place] [address]. All rights reserved. This information is not intended as a substitute for professional medical care. Always follow your healthcare professional's instructions.        Step-by-Step:  Inspecting Your Feet (Diabetes)    Date Last Reviewed: 10/1/2016  © [id number] [employer]. [address] [place] [id number]. All rights reserved. This information is not intended as a substitute for professional medical care. Always follow your healthcare  professional's instructions.

## 2021-01-25 ENCOUNTER — OFFICE VISIT (OUTPATIENT)
Dept: PODIATRY | Facility: CLINIC | Age: 76
End: 2021-01-25
Payer: MEDICARE

## 2021-01-25 VITALS — BODY MASS INDEX: 20.86 KG/M2 | HEIGHT: 71 IN | WEIGHT: 149 LBS

## 2021-01-25 DIAGNOSIS — B35.1 ONYCHOMYCOSIS DUE TO DERMATOPHYTE: ICD-10-CM

## 2021-01-25 DIAGNOSIS — M20.41 HAMMER TOES OF BOTH FEET: ICD-10-CM

## 2021-01-25 DIAGNOSIS — L90.9 PLANTAR FAT PAD ATROPHY: ICD-10-CM

## 2021-01-25 DIAGNOSIS — M20.42 HAMMER TOES OF BOTH FEET: ICD-10-CM

## 2021-01-25 DIAGNOSIS — M20.12 HALLUX ABDUCTO VALGUS, LEFT: ICD-10-CM

## 2021-01-25 DIAGNOSIS — E11.49 TYPE II DIABETES MELLITUS WITH NEUROLOGICAL MANIFESTATIONS: Primary | ICD-10-CM

## 2021-01-25 DIAGNOSIS — L84 CORN OR CALLUS: ICD-10-CM

## 2021-01-25 DIAGNOSIS — M20.11 HALLUX ABDUCTO VALGUS, RIGHT: ICD-10-CM

## 2021-01-25 PROCEDURE — 3288F PR FALLS RISK ASSESSMENT DOCUMENTED: ICD-10-PCS | Mod: CPTII,S$GLB,, | Performed by: PODIATRIST

## 2021-01-25 PROCEDURE — 3288F FALL RISK ASSESSMENT DOCD: CPT | Mod: CPTII,S$GLB,, | Performed by: PODIATRIST

## 2021-01-25 PROCEDURE — 11721 PR DEBRIDEMENT OF NAILS, 6 OR MORE: ICD-10-PCS | Mod: 59,Q9,S$GLB, | Performed by: PODIATRIST

## 2021-01-25 PROCEDURE — 11721 DEBRIDE NAIL 6 OR MORE: CPT | Mod: 59,Q9,S$GLB, | Performed by: PODIATRIST

## 2021-01-25 PROCEDURE — 99999 PR PBB SHADOW E&M-EST. PATIENT-LVL III: CPT | Mod: PBBFAC,,, | Performed by: PODIATRIST

## 2021-01-25 PROCEDURE — 99999 PR PBB SHADOW E&M-EST. PATIENT-LVL III: ICD-10-PCS | Mod: PBBFAC,,, | Performed by: PODIATRIST

## 2021-01-25 PROCEDURE — 1125F AMNT PAIN NOTED PAIN PRSNT: CPT | Mod: S$GLB,,, | Performed by: PODIATRIST

## 2021-01-25 PROCEDURE — 99499 NO LOS: ICD-10-PCS | Mod: S$GLB,,, | Performed by: PODIATRIST

## 2021-01-25 PROCEDURE — 11056 PR TRIM BENIGN HYPERKERATOTIC SKIN LESION,2-4: ICD-10-PCS | Mod: Q9,S$GLB,, | Performed by: PODIATRIST

## 2021-01-25 PROCEDURE — 99499 UNLISTED E&M SERVICE: CPT | Mod: S$GLB,,, | Performed by: PODIATRIST

## 2021-01-25 PROCEDURE — 1101F PR PT FALLS ASSESS DOC 0-1 FALLS W/OUT INJ PAST YR: ICD-10-PCS | Mod: CPTII,S$GLB,, | Performed by: PODIATRIST

## 2021-01-25 PROCEDURE — 11056 PARNG/CUTG B9 HYPRKR LES 2-4: CPT | Mod: Q9,S$GLB,, | Performed by: PODIATRIST

## 2021-01-25 PROCEDURE — 1125F PR PAIN SEVERITY QUANTIFIED, PAIN PRESENT: ICD-10-PCS | Mod: S$GLB,,, | Performed by: PODIATRIST

## 2021-01-25 PROCEDURE — 1101F PT FALLS ASSESS-DOCD LE1/YR: CPT | Mod: CPTII,S$GLB,, | Performed by: PODIATRIST

## 2021-04-26 ENCOUNTER — OFFICE VISIT (OUTPATIENT)
Dept: PODIATRY | Facility: CLINIC | Age: 76
End: 2021-04-26
Payer: MEDICARE

## 2021-04-26 VITALS — HEIGHT: 71 IN | WEIGHT: 149.06 LBS | BODY MASS INDEX: 20.87 KG/M2

## 2021-04-26 DIAGNOSIS — M20.12 HALLUX ABDUCTO VALGUS, LEFT: ICD-10-CM

## 2021-04-26 DIAGNOSIS — L90.9 PLANTAR FAT PAD ATROPHY: ICD-10-CM

## 2021-04-26 DIAGNOSIS — E11.49 TYPE II DIABETES MELLITUS WITH NEUROLOGICAL MANIFESTATIONS: Primary | ICD-10-CM

## 2021-04-26 DIAGNOSIS — L84 CORN OR CALLUS: ICD-10-CM

## 2021-04-26 DIAGNOSIS — M20.11 HALLUX ABDUCTO VALGUS, RIGHT: ICD-10-CM

## 2021-04-26 DIAGNOSIS — B35.1 ONYCHOMYCOSIS DUE TO DERMATOPHYTE: ICD-10-CM

## 2021-04-26 DIAGNOSIS — M20.41 HAMMER TOES OF BOTH FEET: ICD-10-CM

## 2021-04-26 DIAGNOSIS — M20.42 HAMMER TOES OF BOTH FEET: ICD-10-CM

## 2021-04-26 PROCEDURE — 11721 PR DEBRIDEMENT OF NAILS, 6 OR MORE: ICD-10-PCS | Mod: 59,Q9,S$GLB, | Performed by: PODIATRIST

## 2021-04-26 PROCEDURE — 3288F PR FALLS RISK ASSESSMENT DOCUMENTED: ICD-10-PCS | Mod: CPTII,S$GLB,, | Performed by: PODIATRIST

## 2021-04-26 PROCEDURE — 11721 DEBRIDE NAIL 6 OR MORE: CPT | Mod: 59,Q9,S$GLB, | Performed by: PODIATRIST

## 2021-04-26 PROCEDURE — 99499 UNLISTED E&M SERVICE: CPT | Mod: S$GLB,,, | Performed by: PODIATRIST

## 2021-04-26 PROCEDURE — 3288F FALL RISK ASSESSMENT DOCD: CPT | Mod: CPTII,S$GLB,, | Performed by: PODIATRIST

## 2021-04-26 PROCEDURE — 11056 PARNG/CUTG B9 HYPRKR LES 2-4: CPT | Mod: Q9,S$GLB,, | Performed by: PODIATRIST

## 2021-04-26 PROCEDURE — 11056 PR TRIM BENIGN HYPERKERATOTIC SKIN LESION,2-4: ICD-10-PCS | Mod: Q9,S$GLB,, | Performed by: PODIATRIST

## 2021-04-26 PROCEDURE — 99999 PR PBB SHADOW E&M-EST. PATIENT-LVL III: ICD-10-PCS | Mod: PBBFAC,,, | Performed by: PODIATRIST

## 2021-04-26 PROCEDURE — 1101F PR PT FALLS ASSESS DOC 0-1 FALLS W/OUT INJ PAST YR: ICD-10-PCS | Mod: CPTII,S$GLB,, | Performed by: PODIATRIST

## 2021-04-26 PROCEDURE — 99499 NO LOS: ICD-10-PCS | Mod: S$GLB,,, | Performed by: PODIATRIST

## 2021-04-26 PROCEDURE — 99999 PR PBB SHADOW E&M-EST. PATIENT-LVL III: CPT | Mod: PBBFAC,,, | Performed by: PODIATRIST

## 2021-04-26 PROCEDURE — 1101F PT FALLS ASSESS-DOCD LE1/YR: CPT | Mod: CPTII,S$GLB,, | Performed by: PODIATRIST

## 2021-09-16 ENCOUNTER — OFFICE VISIT (OUTPATIENT)
Dept: PODIATRY | Facility: CLINIC | Age: 76
End: 2021-09-16
Payer: MEDICARE

## 2021-09-16 VITALS — HEIGHT: 71 IN | WEIGHT: 149 LBS | BODY MASS INDEX: 20.86 KG/M2

## 2021-09-16 DIAGNOSIS — M20.11 HALLUX ABDUCTO VALGUS, RIGHT: ICD-10-CM

## 2021-09-16 DIAGNOSIS — E11.49 TYPE II DIABETES MELLITUS WITH NEUROLOGICAL MANIFESTATIONS: Primary | ICD-10-CM

## 2021-09-16 DIAGNOSIS — M20.41 HAMMER TOES OF BOTH FEET: ICD-10-CM

## 2021-09-16 DIAGNOSIS — L84 CORN OR CALLUS: ICD-10-CM

## 2021-09-16 DIAGNOSIS — M20.42 HAMMER TOES OF BOTH FEET: ICD-10-CM

## 2021-09-16 DIAGNOSIS — L90.9 PLANTAR FAT PAD ATROPHY: ICD-10-CM

## 2021-09-16 DIAGNOSIS — M20.12 HALLUX ABDUCTO VALGUS, LEFT: ICD-10-CM

## 2021-09-16 DIAGNOSIS — B35.1 ONYCHOMYCOSIS DUE TO DERMATOPHYTE: ICD-10-CM

## 2021-09-16 PROCEDURE — 1126F PR PAIN SEVERITY QUANTIFIED, NO PAIN PRESENT: ICD-10-PCS | Mod: CPTII,S$GLB,, | Performed by: PODIATRIST

## 2021-09-16 PROCEDURE — 3288F PR FALLS RISK ASSESSMENT DOCUMENTED: ICD-10-PCS | Mod: CPTII,S$GLB,, | Performed by: PODIATRIST

## 2021-09-16 PROCEDURE — 11056 PARNG/CUTG B9 HYPRKR LES 2-4: CPT | Mod: Q9,S$GLB,, | Performed by: PODIATRIST

## 2021-09-16 PROCEDURE — 1101F PT FALLS ASSESS-DOCD LE1/YR: CPT | Mod: CPTII,S$GLB,, | Performed by: PODIATRIST

## 2021-09-16 PROCEDURE — 1160F PR REVIEW ALL MEDS BY PRESCRIBER/CLIN PHARMACIST DOCUMENTED: ICD-10-PCS | Mod: CPTII,S$GLB,, | Performed by: PODIATRIST

## 2021-09-16 PROCEDURE — 1159F PR MEDICATION LIST DOCUMENTED IN MEDICAL RECORD: ICD-10-PCS | Mod: CPTII,S$GLB,, | Performed by: PODIATRIST

## 2021-09-16 PROCEDURE — 1101F PR PT FALLS ASSESS DOC 0-1 FALLS W/OUT INJ PAST YR: ICD-10-PCS | Mod: CPTII,S$GLB,, | Performed by: PODIATRIST

## 2021-09-16 PROCEDURE — 1160F RVW MEDS BY RX/DR IN RCRD: CPT | Mod: CPTII,S$GLB,, | Performed by: PODIATRIST

## 2021-09-16 PROCEDURE — 99999 PR PBB SHADOW E&M-EST. PATIENT-LVL III: ICD-10-PCS | Mod: PBBFAC,,, | Performed by: PODIATRIST

## 2021-09-16 PROCEDURE — 1126F AMNT PAIN NOTED NONE PRSNT: CPT | Mod: CPTII,S$GLB,, | Performed by: PODIATRIST

## 2021-09-16 PROCEDURE — 11721 PR DEBRIDEMENT OF NAILS, 6 OR MORE: ICD-10-PCS | Mod: 59,Q9,S$GLB, | Performed by: PODIATRIST

## 2021-09-16 PROCEDURE — 99499 NO LOS: ICD-10-PCS | Mod: S$GLB,,, | Performed by: PODIATRIST

## 2021-09-16 PROCEDURE — 99499 UNLISTED E&M SERVICE: CPT | Mod: S$GLB,,, | Performed by: PODIATRIST

## 2021-09-16 PROCEDURE — 3288F FALL RISK ASSESSMENT DOCD: CPT | Mod: CPTII,S$GLB,, | Performed by: PODIATRIST

## 2021-09-16 PROCEDURE — 11721 DEBRIDE NAIL 6 OR MORE: CPT | Mod: 59,Q9,S$GLB, | Performed by: PODIATRIST

## 2021-09-16 PROCEDURE — 11056 PR TRIM BENIGN HYPERKERATOTIC SKIN LESION,2-4: ICD-10-PCS | Mod: Q9,S$GLB,, | Performed by: PODIATRIST

## 2021-09-16 PROCEDURE — 1159F MED LIST DOCD IN RCRD: CPT | Mod: CPTII,S$GLB,, | Performed by: PODIATRIST

## 2021-09-16 PROCEDURE — 99999 PR PBB SHADOW E&M-EST. PATIENT-LVL III: CPT | Mod: PBBFAC,,, | Performed by: PODIATRIST

## 2021-12-16 ENCOUNTER — OFFICE VISIT (OUTPATIENT)
Dept: PODIATRY | Facility: CLINIC | Age: 76
End: 2021-12-16
Payer: MEDICARE

## 2021-12-16 VITALS — WEIGHT: 149 LBS | BODY MASS INDEX: 20.86 KG/M2 | HEIGHT: 71 IN

## 2021-12-16 DIAGNOSIS — M20.41 HAMMER TOES OF BOTH FEET: ICD-10-CM

## 2021-12-16 DIAGNOSIS — M20.12 HALLUX ABDUCTO VALGUS, LEFT: ICD-10-CM

## 2021-12-16 DIAGNOSIS — B35.1 ONYCHOMYCOSIS DUE TO DERMATOPHYTE: ICD-10-CM

## 2021-12-16 DIAGNOSIS — L84 CORN OR CALLUS: ICD-10-CM

## 2021-12-16 DIAGNOSIS — M20.42 HAMMER TOES OF BOTH FEET: ICD-10-CM

## 2021-12-16 DIAGNOSIS — E11.49 TYPE II DIABETES MELLITUS WITH NEUROLOGICAL MANIFESTATIONS: Primary | ICD-10-CM

## 2021-12-16 DIAGNOSIS — L90.9 PLANTAR FAT PAD ATROPHY: ICD-10-CM

## 2021-12-16 DIAGNOSIS — M20.11 HALLUX ABDUCTO VALGUS, RIGHT: ICD-10-CM

## 2021-12-16 PROCEDURE — 11721 DEBRIDE NAIL 6 OR MORE: CPT | Mod: 59,Q9,S$GLB, | Performed by: PODIATRIST

## 2021-12-16 PROCEDURE — 11721 PR DEBRIDEMENT OF NAILS, 6 OR MORE: ICD-10-PCS | Mod: 59,Q9,S$GLB, | Performed by: PODIATRIST

## 2021-12-16 PROCEDURE — 99999 PR PBB SHADOW E&M-EST. PATIENT-LVL III: ICD-10-PCS | Mod: PBBFAC,,, | Performed by: PODIATRIST

## 2021-12-16 PROCEDURE — 11056 PARNG/CUTG B9 HYPRKR LES 2-4: CPT | Mod: Q9,S$GLB,, | Performed by: PODIATRIST

## 2021-12-16 PROCEDURE — 99499 UNLISTED E&M SERVICE: CPT | Mod: S$GLB,,, | Performed by: PODIATRIST

## 2021-12-16 PROCEDURE — 11056 PR TRIM BENIGN HYPERKERATOTIC SKIN LESION,2-4: ICD-10-PCS | Mod: Q9,S$GLB,, | Performed by: PODIATRIST

## 2021-12-16 PROCEDURE — 99999 PR PBB SHADOW E&M-EST. PATIENT-LVL III: CPT | Mod: PBBFAC,,, | Performed by: PODIATRIST

## 2021-12-16 PROCEDURE — 99499 NO LOS: ICD-10-PCS | Mod: S$GLB,,, | Performed by: PODIATRIST

## 2022-03-16 ENCOUNTER — OFFICE VISIT (OUTPATIENT)
Dept: PODIATRY | Facility: CLINIC | Age: 77
End: 2022-03-16
Payer: MEDICARE

## 2022-03-16 VITALS — HEIGHT: 71 IN | WEIGHT: 149 LBS | BODY MASS INDEX: 20.86 KG/M2

## 2022-03-16 DIAGNOSIS — M20.42 HAMMER TOES OF BOTH FEET: ICD-10-CM

## 2022-03-16 DIAGNOSIS — M72.2 PLANTAR FASCIITIS: ICD-10-CM

## 2022-03-16 DIAGNOSIS — M20.41 HAMMER TOES OF BOTH FEET: ICD-10-CM

## 2022-03-16 DIAGNOSIS — B35.1 ONYCHOMYCOSIS DUE TO DERMATOPHYTE: ICD-10-CM

## 2022-03-16 DIAGNOSIS — M20.12 HALLUX ABDUCTO VALGUS, LEFT: ICD-10-CM

## 2022-03-16 DIAGNOSIS — E11.49 TYPE II DIABETES MELLITUS WITH NEUROLOGICAL MANIFESTATIONS: Primary | ICD-10-CM

## 2022-03-16 DIAGNOSIS — L90.9 PLANTAR FAT PAD ATROPHY: ICD-10-CM

## 2022-03-16 DIAGNOSIS — M20.11 HALLUX ABDUCTO VALGUS, RIGHT: ICD-10-CM

## 2022-03-16 DIAGNOSIS — M79.672 PAIN OF LEFT HEEL: ICD-10-CM

## 2022-03-16 DIAGNOSIS — L84 CORN OR CALLUS: ICD-10-CM

## 2022-03-16 PROCEDURE — 99214 OFFICE O/P EST MOD 30 MIN: CPT | Mod: 25,S$GLB,, | Performed by: PODIATRIST

## 2022-03-16 PROCEDURE — 11721 PR DEBRIDEMENT OF NAILS, 6 OR MORE: ICD-10-PCS | Mod: Q9,59,S$GLB, | Performed by: PODIATRIST

## 2022-03-16 PROCEDURE — 11056 PR TRIM BENIGN HYPERKERATOTIC SKIN LESION,2-4: ICD-10-PCS | Mod: Q9,S$GLB,, | Performed by: PODIATRIST

## 2022-03-16 PROCEDURE — 1101F PT FALLS ASSESS-DOCD LE1/YR: CPT | Mod: CPTII,S$GLB,, | Performed by: PODIATRIST

## 2022-03-16 PROCEDURE — 11056 PARNG/CUTG B9 HYPRKR LES 2-4: CPT | Mod: Q9,S$GLB,, | Performed by: PODIATRIST

## 2022-03-16 PROCEDURE — 99999 PR PBB SHADOW E&M-EST. PATIENT-LVL III: CPT | Mod: PBBFAC,,, | Performed by: PODIATRIST

## 2022-03-16 PROCEDURE — 1159F MED LIST DOCD IN RCRD: CPT | Mod: CPTII,S$GLB,, | Performed by: PODIATRIST

## 2022-03-16 PROCEDURE — 1159F PR MEDICATION LIST DOCUMENTED IN MEDICAL RECORD: ICD-10-PCS | Mod: CPTII,S$GLB,, | Performed by: PODIATRIST

## 2022-03-16 PROCEDURE — 99999 PR PBB SHADOW E&M-EST. PATIENT-LVL III: ICD-10-PCS | Mod: PBBFAC,,, | Performed by: PODIATRIST

## 2022-03-16 PROCEDURE — 1125F PR PAIN SEVERITY QUANTIFIED, PAIN PRESENT: ICD-10-PCS | Mod: CPTII,S$GLB,, | Performed by: PODIATRIST

## 2022-03-16 PROCEDURE — 11721 DEBRIDE NAIL 6 OR MORE: CPT | Mod: Q9,59,S$GLB, | Performed by: PODIATRIST

## 2022-03-16 PROCEDURE — 1101F PR PT FALLS ASSESS DOC 0-1 FALLS W/OUT INJ PAST YR: ICD-10-PCS | Mod: CPTII,S$GLB,, | Performed by: PODIATRIST

## 2022-03-16 PROCEDURE — 99214 PR OFFICE/OUTPT VISIT, EST, LEVL IV, 30-39 MIN: ICD-10-PCS | Mod: 25,S$GLB,, | Performed by: PODIATRIST

## 2022-03-16 PROCEDURE — 3288F PR FALLS RISK ASSESSMENT DOCUMENTED: ICD-10-PCS | Mod: CPTII,S$GLB,, | Performed by: PODIATRIST

## 2022-03-16 PROCEDURE — 1125F AMNT PAIN NOTED PAIN PRSNT: CPT | Mod: CPTII,S$GLB,, | Performed by: PODIATRIST

## 2022-03-16 PROCEDURE — 1160F PR REVIEW ALL MEDS BY PRESCRIBER/CLIN PHARMACIST DOCUMENTED: ICD-10-PCS | Mod: CPTII,S$GLB,, | Performed by: PODIATRIST

## 2022-03-16 PROCEDURE — 3288F FALL RISK ASSESSMENT DOCD: CPT | Mod: CPTII,S$GLB,, | Performed by: PODIATRIST

## 2022-03-16 PROCEDURE — 1160F RVW MEDS BY RX/DR IN RCRD: CPT | Mod: CPTII,S$GLB,, | Performed by: PODIATRIST

## 2022-03-16 RX ORDER — MELOXICAM 15 MG/1
15 TABLET ORAL DAILY
Qty: 30 TABLET | Refills: 1 | Status: SHIPPED | OUTPATIENT
Start: 2022-03-16 | End: 2023-03-16

## 2022-03-16 NOTE — PATIENT INSTRUCTIONS
Supplements for inflammation: Arnica Tabs, bromelain with tumeric, alpha lipoic acid, garlic     Over the counter pain creams: Biofreeze, Bengay, tiger balm, two old goat, lidocaine gel,  Absorbine Veterinary Liniment Gel Topical Analgesic Sore Muscle and Joint Pain Relief    Recommend lotions: eucerin, eucerin for diabetics, aquaphor, A&D ointment, gold bond for diabetics, sween, Aaron's Bees all purpose baby ointment,  urea 40 with aloe (found on amazon.com)    Shoe recommendations: (try 6pm.com, zappos.Sportmaniacs , nordstromrack.Sportmaniacs, or shoes.Sportmaniacs for discounted prices) you can visit [employer] shoes in [place]  or [employer] in the [facility] (there are also several shoe brand outlets in the [facility])    Asics (GT 2000 or gel foundations), new balance stability type shoes (such as the 940 series), gino (stabil c3),  Drew (GTS or Beast or transcend), propet, Andre (tennis shoe)    Sofft Brand, baretraps (women) Thompson&Mich (men), clarks, crocs, aerosoles, naturalizers, SAS, ecco, born, ant latisha, jacob (dress shoes)    Vionic, burkenstocks, fitflops, propet, baretraps (sandals)    Nike comfort thong sandals, crocs, propet (house shoes)    Nail Home remedy:  Vicks Vapor rub or Emuaid to nails for easier manageability    Occasional soaks for 15-20 mins in luke warm water with 1/2 cup of listerine and 1 cup of apple cider vinegar are ok You may add several drops of oil of oregano or tea tree oil as well    Understanding Plantar Fasciitis    Plantar fasciitis is a condition that causes foot and heel pain. The plantar fascia is a tough band of tissue that runs across the bottom of the foot from the heel to the toes. This tissue pulls on the heel bone. It supports the arch of the foot as it pushes off the ground. If the tissue becomes irritated or red and swollen (inflamed), it is called plantar fasciitis.  How to say it  PLAN-tuhr fa-see-IY-tis   What causes plantar fasciitis?  Plantar fasciitis most often occurs  from overusing the plantar fascia. The tissue may become damaged from activities that put repeated stress on the heel and foot. Or it may wear down over time with age and ankle stiffness. You are more likely to have plantar fasciitis if you:  Do activities that require a lot of running, jumping, or dancing  Have a job that requires being on your feet for long periods  Are overweight or obese  Have certain foot problems, such as a tight Achilles tendon, flat feet, or high arches  Often wear poorly fitting shoes  Symptoms of plantar fasciitis  The condition most often causes pain in the heel and the bottom of the foot. The pain may occur when you take your first steps in the morning. It may get better as you walk throughout the day. But as you continue to put weight on the foot, the pain often returns. Pain may also occur after standing or sitting for long periods.  Treating plantar fasciitis  Treatments for plantar fasciitis include:  Resting the foot. This involves limiting movements that make your foot hurt. You may also need to avoid certain sports and types of work for a time.  Using cold packs. Put an ice pack on the heel and foot to help reduce pain and swelling.  Taking pain medicines. Prescription and over-the-counter pain medicines can help relieve pain and swelling.  Using heel cups or foot inserts (orthotics). These are placed in the shoes to help support the heel or arch and cushion the heel. You may also be told to buy proper-fitting shoes with good arch support and cushioned soles.  Taping the foot. This supports the arch and limits the movement of the plantar fascia to help relieve symptoms.  Wearing a night splint. This stretches the plantar fascia and leg muscles while you sleep. This may help relieve pain.  Doing exercises and physical therapy. These stretch and strengthen the plantar fascia and the muscles in the leg that support the heel and foot.  Getting shots of medicine into the foot. These  may help relieve symptoms for a time.  Having surgery. This may be needed if other treatments fail to relieve symptoms. During surgery, the surgeon may partially cut the plantar fascia to release tension.  Possible complications of plantar fasciitis  Without proper care and treatment, healing may take longer than normal. Also, symptoms may continue or get worse. Over time, the plantar fascia may be damaged. This can make it hard to walk or even stand without pain.  When to call your healthcare provider  Call your healthcare provider right away if you have any of these:  Fever of 100.4°F (38°C) or higher, or as directed  Symptoms that dont get better with treatment, or get worse  New symptoms, such as numbness, tingling, or weakness in the foot   © 9164-0103 [employer]. [address] [place] [address], [place] [address]. All rights reserved. This information is not intended as a substitute for professional medical care. Always follow your healthcare professional's instructions.        Treating Plantar Fasciitis  First, your healthcare provider tries to determine the cause of your problem in order to suggest ways to relieve pain. If your pain is due to poor foot mechanics, custom-made shoe inserts (orthoses) may help.    Reduce symptoms  To relieve mild symptoms, try aspirin, ibuprofen, or other medicines as directed. Rubbing ice on the affected area may also help.  To reduce severe pain and swelling, your healthcare provider may prescribe pills or injections or a walking cast in some instances. Physical therapy, such as ultrasound or a daily stretching program, may also be recommended. Surgery is rarely required.  To reduce symptoms caused by poor foot mechanics, your foot may be taped. This supports the arch and temporarily controls movement. Night splints may also help by stretching the fascia.  Control movement  If taping helps, your healthcare provider may prescribe orthoses. Built from plaster casts of  your feet, these inserts control the way your foot moves. As a result, your symptoms should go away.  Reduce overuse  Every time your foot strikes the ground, the plantar fascia is stretched. You can reduce the strain on the plantar fascia and the possibility of overuse by following these suggestions:  Lose any excess weight.  Avoid running on hard or uneven ground.  Use orthoses at all times in your shoes and house slippers.  If surgery is needed  Your healthcare provider may consider surgery if other types of treatment don't control your pain. During surgery, the plantar fascia is partially cut to release tension. As you heal, fibrous tissue fills the space between the heel bone and the plantar fascia.   © 0984-2795 [employer]. [address] [place] [address], [place] [address]. All rights reserved. This information is not intended as a substitute for professional medical care. Always follow your healthcare professional's instructions.        Wearing Proper Shoes                    You walk on your feet every day, forcing them to support the weight of your body. Repeated stress on your feet can cause damage over time. The right shoes can help protect your feet. The wrong shoes can cause more foot problems. Read the information below to help you find a shoe that fits your foot needs.     A good shoe fit will cover your foot outline.       A shoe that doesnt cover the outline is a bad fit.      Whats Your Foot Shape?  To get a good fit, you need to know the shape of your foot. Do this simple test: While standing, place your foot on a piece of paper and trace around it. Is your foot straight or curved? Do you have a foot problem, such as a bunion, that causes your foot outline to show a bulge on the side of your big toe?  Finding Your Fit  Bring your foot outline to the [employer] store to help you find the right shoe. Place a shoe you like on top of the outline to see if it matches the shape. The shoe should  cover the outline. (If you have a bunion, the shoe may not cover the bulge on the outline. Look for soft leather shoes to stretch over the bunion.) Once youve found a pair of proper shoes, put them on. Walk around. Be sure the shoes dont rub or pinch. If the shoes feel good, youve found your fit!  The Right Shoe for You  A good shoe has features that provide comfort and support. It must also be the right size and shape for your feet. Look for a shoe made of breathable fabric and lining, such as leather or canvas. Be sure that shoes have enough tread to prevent slipping. Go to a good shoe store for help finding the right shoe.  Good Shoe Features  An ideal shoe has the following:  Laces for support. If tying laces is a problem for you, try shoes with Velcro fasteners or araceli.  A front of the shoe (toe box) with ½ inch space in front of your longest toes.  An arch shape that supports your foot.  No more than 1½ inches of heel.  A stiff, snug back of the shoe to keep your foot from sliding around.  A smooth lining with no rough seams.  Shoe Shopping Tips  Below are some dos and donts for when you go to the shoe store.  Do:  Select the shoes that feel right. Wear them around the house. Then bring them to your foot doctor to check for fit. If they dont fit well, return them.  Shop late in the day, when your feet will be slightly bigger.  Each time you buy shoes, have both your feet measured while you are standing. Foot size changes with time.  Pick shoes to suit their purpose. High heels are okay for an occasional night on the town. But for everyday wear, choose a more sensible shoe.  Try on shoes while wearing any inserts specially made for your feet (orthoses).  Try on both the right and left shoes. If your feet are different sizes, pick a pair that fits the larger foot.  Dont:  Dont buy shoes based on shoe size alone. Always try on shoes, as sizes differ from brand to brand and within brands.  Dont expect  shoes to break in. If they dont fit at the store, dont buy them.  Dont buy a shoe that doesnt match your foot shape.  What About Socks?  Always wear socks with shoes. Socks help absorb sweat and reduce friction and blistering. When shopping for shoes, choose soft, padded socks with seams that dont irritate your feet.  If You Have Foot Problems  Some foot problems cause deformities. This can make it hard to find a good fit. Look for shoes made of soft leather to stretch over the deformity. If you have bunions, buy shoes with a wider toe box. To fit hammertoes, look for shoes with a tall toe box. If you have arch problems, you may need inserts. In some cases, youll need to have custom footwear or orthoses made for your feet.  Suggested Footwear  Ask your healthcare provider what kind of footwear you need. He or she may recommend a certain brand or shoe store.  © 0278-7042 [employer]. [address] [place] [address]. All rights reserved. This information is not intended as a substitute for professional medical care. Always follow your healthcare professional's instructions.        Toe Extension (Flexibility)    These instructions are for your right foot. Switch sides for your left foot.  Sit in a chair. Rest your right ankle on your left knee.  Hold your toes with your right hand. Gently bend the toes backward. Feel a stretch in the undersides of the toes and ball of the foot. Hold for 30 to 60 seconds.  Then gently bend the toes in the other direction. Gently press on them until your foot is pointed. Hold for 30 to 60 seconds.  Repeat 5 times, or as instructed.  © 2000-2016 [employer]. [address] [place] [address]. All rights reserved. This information is not intended as a substitute for professional medical care. Always follow your healthcare professional's instructions.

## 2022-03-16 NOTE — PROGRESS NOTES
Subjective:      Patient ID: Avni Daniels is a 77 y.o. male.    Chief Complaint: Diabetes Mellitus (PCP  (PARVEEN Ng 01/31/2022)), Nail Care, Foot Problem (Left foot ), and Foot Pain    Avni is a 77 y.o. male who presents to the clinic for evaluation and treatment of high risk feet. Avni has a past medical history of Diabetes mellitus, type 2 and Hypertension.  Presents to the clinic complaining of left plantar medial heel pain, especially with the first step in the morning. The pain is described as Aching, Throbbing and Sharp. The onset of the pain was gradual and has worsened over the past several months. Avni Daniels rates the pain as 7/10. he denies a history of trauma. he relates pain began without known inciting event, perhaps secondary to increased ambulation and stair climbing at work. Prior treatments include none.  Also complains of painful left foot callus and elongated, thickened toenails aggravated by increased weight bearing, shoe gear, pressure.  This patient has documented high risk feet requiring routine maintenance secondary to diabetes mellitis and those secondary complications of diabetes, as mentioned..    PCP: Reyes Gusman MD    Date Last Seen by PCP:   Chief Complaint   Patient presents with    Diabetes Mellitus     PCP  (PARVEEN ArmasHernandez 01/31/2022)    Nail Care    Foot Problem     Left foot     Foot Pain     Current shoe gear:  Casual rx shoes, new rx shoes for 3 months      Hemoglobin A1c   Date Value Ref Range Status   07/07/2020 8.3 (H) <5.7 % of total Hgb Final     Comment:     For someone without known diabetes, a hemoglobin A1c  value of 6.5% or greater indicates that they may have   diabetes and this should be confirmed with a follow-up   test.    For someone with known diabetes, a value <7% indicates   that their diabetes is well controlled and a value   greater than or equal to 7% indicates suboptimal   control. A1c targets should be individualized  "based on   duration of diabetes, age, comorbid conditions, and   other considerations.    Currently, no consensus exists regarding use of  hemoglobin A1c for diagnosis of diabetes for children.       03/05/2020 7.9 (H) <5.7 % of total Hgb Final     Comment:     For someone without known diabetes, a hemoglobin A1c  value of 6.5% or greater indicates that they may have   diabetes and this should be confirmed with a follow-up   test.    For someone with known diabetes, a value <7% indicates   that their diabetes is well controlled and a value   greater than or equal to 7% indicates suboptimal   control. A1c targets should be individualized based on   duration of diabetes, age, comorbid conditions, and   other considerations.    Currently, no consensus exists regarding use of  hemoglobin A1c for diagnosis of diabetes for children.               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      atorvastatin (LIPITOR) 40 MG tablet       clopidogrel (PLAVIX) 75 mg tablet Take 75 mg by mouth once.       enalapril (VASOTEC) 5 MG tablet Take 5 mg by mouth once daily.       insulin NPH-insulin regular, 70/30, (NOVOLIN 70/30) 100 unit/mL (70-30) injection Inject 25 Units into the skin 2 (two) times daily.       insulin syringe-needle U-100 0.5 mL 31 gauge x 5/16 Syrg       insulin syringe-needle U-100 1/2 mL 30 gauge x 5/16 Syrg       metoprolol succinate (TOPROL-XL) 25 MG 24 hr tablet Take 25 mg by mouth once daily.      metoprolol tartrate (LOPRESSOR) 25 MG tablet Take 25 mg by mouth Daily.      PACERONE 100 mg Tab Take 100 mg by mouth once daily.       THINPRO INSULIN SYRINGE 0.5 mL 31 x 3/8" Syrg       VITAMIN D2 1,250 " "mcg (50,000 unit) capsule TAKE 1 CAPSULE BY MOUTH ONCE EVERY MONTH       No current facility-administered medications on file prior to visit.       Review of patient's allergies indicates:  No Known Allergies    Past Surgical History:   Procedure Laterality Date    APPENDECTOMY      GALLBLADDER SURGERY      TUMOR REMOVAL Left     foot       History reviewed. No pertinent family history.    Social History     Socioeconomic History    Marital status:    Tobacco Use    Smoking status: Former Smoker    Smokeless tobacco: Former User         Review of Systems   Constitution: Negative for chills and fever.   Cardiovascular: Negative for claudication and leg swelling.   Respiratory: Negative for cough and shortness of breath.    Skin: Positive for dry skin, nail changes and poor wound healing (hx of left foot ulcer). Negative for itching and rash.   Musculoskeletal: Positive for joint pain. Negative for falls, joint swelling and muscle weakness.   Gastrointestinal: Negative for diarrhea, nausea and vomiting.   Neurological: Positive for numbness and paresthesias. Negative for tremors and weakness.   Psychiatric/Behavioral: Negative for altered mental status and hallucinations.           Objective:       Vitals:    03/16/22 1109   Weight: 67.6 kg (149 lb)   Height: 5' 11" (1.803 m)   PainSc:   7   PainLoc: Foot       Physical Exam   Constitutional:  Non-toxic appearance. He does not have a sickly appearance. No distress.   Pt. is well-developed, well-nourished, appears stated age, in no acute distress, alert and oriented x 3. No evidence of depression, anxiety, or agitation. Calm, cooperative, and communicative. Appropriate interactions and affect.   Cardiovascular:   Pulses:       Dorsalis pedis pulses are 1+ on the right side, and 1+ on the left side.        Posterior tibial pulses are 1+ on the right side, and 1+ on the left side.   Dorsalis pedis and posterior tibial pulses are diminished Bilaterally. Toes " are cool to touch. Feet are warm proximally.There is decreased digital hair. Skin is atrophic   Pulmonary/Chest: No respiratory distress.   Musculoskeletal:        Right ankle: No tenderness. No lateral malleolus, no medial malleolus, no AITFL, no CF ligament and no posterior TFL tenderness found. Achilles tendon exhibits no pain, no defect and normal Elias's test results.        Left ankle: No tenderness. No lateral malleolus, no medial malleolus, no AITFL, no CF ligament and no posterior TFL tenderness found. Achilles tendon exhibits no pain, no defect and normal Elias's test results.        Right foot: There is no tenderness and no bony tenderness.        Left foot: There is pain on palpation medial calcaneal tubercle at origin of plantar fascia. No tenderness with compression of heel. Negative tinels sign.   Decreased stride, station of gait.  apropulsive toe off.  Increased angle and base of gait.    Patient has hammertoes of digits 2-5 bilateral partially reducible without symptom today.    Visible and palpable bunion without pain at dorsomedial 1st metatarsal head right and left.  Hallux abducted right and left partially reducible, tracks laterally without being track bound.  No ecchymosis, erythema, edema, or cardinal signs infection or signs of trauma same foot.    Visible and palpable tailors bunion at dorsolateral 5th metatarsal head right and left.  5th digit is varus right and left partially reducible. No ecchymosis, erythema, edema, or cardinal signs infection.    Fat pad atrophy to heels and met heads bilateral     Lymphadenopathy:   No lymphatic streaking    Negative lymphadenopathy bilateral popliteal fossa and tarsal tunnel.     Neurological:   Astoria-Tamanna 5.07 monofilamant testing is diminished Ryan feet. Decreased/absent vibratory sensation bilateral feet to 128Hz tuning fork.     Paresthesias, and hyperesthesia bilateral feet with no clearly identified trigger or source.            Skin: Skin is warm and dry. No abrasion, no burn, no ecchymosis, no laceration, no petechiae and no rash noted. He is not diaphoretic. No cyanosis. No pallor. Nails show no clubbing.   Skin is thin, atrophic, xerotic    Toenails 1-5 bilaterally are elongated by 2-3 mm, thickened by 2-3 mm, discolored/yellowed, dystrophic, brittle with subungual debris.    Focal hyperkeratotic lesion with painful central core consisting entirely of hyperkeratotic tissue without open skin, drainage, pus, fluctuance, malodor, or signs of infection:  sub 1st and 5th MTPJ luann     Psychiatric: His mood appears not anxious. His affect is not inappropriate. His speech is not slurred. He is not combative. He is communicative. He is attentive.   Nursing note reviewed.        Assessment:       Encounter Diagnoses   Name Primary?    Type II diabetes mellitus with neurological manifestations Yes    Hammer toes of both feet     Hallux abducto valgus, left     Hallux abducto valgus, right     Plantar fat pad atrophy     Corn or callus     Onychomycosis due to dermatophyte     Pain of left heel     Plantar fasciitis          Plan:       Avni was seen today for diabetes mellitus, nail care, foot problem and foot pain.    Diagnoses and all orders for this visit:    Type II diabetes mellitus with neurological manifestations    Hammer toes of both feet    Hallux abducto valgus, left    Hallux abducto valgus, right    Plantar fat pad atrophy    Corn or callus    Onychomycosis due to dermatophyte    Pain of left heel    Plantar fasciitis    Other orders  -     meloxicam (MOBIC) 15 MG tablet; Take 1 tablet (15 mg total) by mouth once daily. 1 pill per day everyday for the next 3 weeks with food      I counseled the patient on his conditions, their implications and medical management.    Shoe inspection. Diabetic Foot Education. Patient reminded of the importance of good nutrition and blood sugar control to help prevent podiatric complications  of diabetes. Patient instructed on proper foot hygeine. We discussed wearing proper shoe gear, daily foot inspections, never walking without protective shoe gear, never putting sharp instruments to feet.      Discussed different treatment options for heel pain. I gave written and verbal instructions on stretching exercises. Patient expressed understanding. Discussed icing the affected area as needed and also wearing appropriate shoe gear and avoiding flats, slippers, sandals, and going barefoot. My recommendation for OTC supports is [employer]oticArch. Patient instructed on adequate icing techniques. Patient should ice the affected area at least once per day x 10 minutes for 10 days . I advised the patient that extra icing would also be beneficial to ensure adequate anti inflammatory effect. We also discussed cortisone injections and NSAID therapy. Mobic prescribed. Patient was instructed on dosing information. Discontinue if adverse effects occur     With patient's permission, nails were aggressively reduced and debrided x 10 to their soft tissue attachment mechanically and with electric , removing all offending nail and debris. Patient relates relief following the procedure.     After cleansing the  area w/ alcohol prep pad the above mentioned hyperkeratosis was trimmed utilizing No 15 scapel, to a smooth base with out incident. Patient tolerated this  well and reported comfort to the area of sub 1st and 5th MTPJ bilaterally    He will continue to monitor the areas daily, inspect her feet, wear protective shoe gear when ambulatory, moisturizer to maintain skin integrity and follow in this office in approximately  2-3 months, sooner p.r.n.

## 2022-05-25 ENCOUNTER — OFFICE VISIT (OUTPATIENT)
Dept: PODIATRY | Facility: CLINIC | Age: 77
End: 2022-05-25
Payer: MEDICARE

## 2022-05-25 VITALS — BODY MASS INDEX: 20.86 KG/M2 | WEIGHT: 149 LBS | HEIGHT: 71 IN

## 2022-05-25 DIAGNOSIS — M20.11 HALLUX ABDUCTO VALGUS, RIGHT: ICD-10-CM

## 2022-05-25 DIAGNOSIS — L90.9 PLANTAR FAT PAD ATROPHY: ICD-10-CM

## 2022-05-25 DIAGNOSIS — E11.49 TYPE II DIABETES MELLITUS WITH NEUROLOGICAL MANIFESTATIONS: Primary | ICD-10-CM

## 2022-05-25 DIAGNOSIS — M20.42 HAMMER TOES OF BOTH FEET: ICD-10-CM

## 2022-05-25 DIAGNOSIS — L84 CORN OR CALLUS: ICD-10-CM

## 2022-05-25 DIAGNOSIS — B35.1 ONYCHOMYCOSIS DUE TO DERMATOPHYTE: ICD-10-CM

## 2022-05-25 DIAGNOSIS — M20.12 HALLUX ABDUCTO VALGUS, LEFT: ICD-10-CM

## 2022-05-25 DIAGNOSIS — M20.41 HAMMER TOES OF BOTH FEET: ICD-10-CM

## 2022-05-25 PROCEDURE — 1126F AMNT PAIN NOTED NONE PRSNT: CPT | Mod: CPTII,S$GLB,, | Performed by: PODIATRIST

## 2022-05-25 PROCEDURE — 3288F PR FALLS RISK ASSESSMENT DOCUMENTED: ICD-10-PCS | Mod: CPTII,S$GLB,, | Performed by: PODIATRIST

## 2022-05-25 PROCEDURE — 1159F PR MEDICATION LIST DOCUMENTED IN MEDICAL RECORD: ICD-10-PCS | Mod: CPTII,S$GLB,, | Performed by: PODIATRIST

## 2022-05-25 PROCEDURE — 11056 PR TRIM BENIGN HYPERKERATOTIC SKIN LESION,2-4: ICD-10-PCS | Mod: Q9,S$GLB,, | Performed by: PODIATRIST

## 2022-05-25 PROCEDURE — 11056 PARNG/CUTG B9 HYPRKR LES 2-4: CPT | Mod: Q9,S$GLB,, | Performed by: PODIATRIST

## 2022-05-25 PROCEDURE — 1160F PR REVIEW ALL MEDS BY PRESCRIBER/CLIN PHARMACIST DOCUMENTED: ICD-10-PCS | Mod: CPTII,S$GLB,, | Performed by: PODIATRIST

## 2022-05-25 PROCEDURE — 11721 PR DEBRIDEMENT OF NAILS, 6 OR MORE: ICD-10-PCS | Mod: 59,Q9,S$GLB, | Performed by: PODIATRIST

## 2022-05-25 PROCEDURE — 11721 DEBRIDE NAIL 6 OR MORE: CPT | Mod: 59,Q9,S$GLB, | Performed by: PODIATRIST

## 2022-05-25 PROCEDURE — 1160F RVW MEDS BY RX/DR IN RCRD: CPT | Mod: CPTII,S$GLB,, | Performed by: PODIATRIST

## 2022-05-25 PROCEDURE — 99999 PR PBB SHADOW E&M-EST. PATIENT-LVL III: ICD-10-PCS | Mod: PBBFAC,,, | Performed by: PODIATRIST

## 2022-05-25 PROCEDURE — 1126F PR PAIN SEVERITY QUANTIFIED, NO PAIN PRESENT: ICD-10-PCS | Mod: CPTII,S$GLB,, | Performed by: PODIATRIST

## 2022-05-25 PROCEDURE — 1101F PR PT FALLS ASSESS DOC 0-1 FALLS W/OUT INJ PAST YR: ICD-10-PCS | Mod: CPTII,S$GLB,, | Performed by: PODIATRIST

## 2022-05-25 PROCEDURE — 99999 PR PBB SHADOW E&M-EST. PATIENT-LVL III: CPT | Mod: PBBFAC,,, | Performed by: PODIATRIST

## 2022-05-25 PROCEDURE — 99499 NO LOS: ICD-10-PCS | Mod: S$GLB,,, | Performed by: PODIATRIST

## 2022-05-25 PROCEDURE — 99499 UNLISTED E&M SERVICE: CPT | Mod: S$GLB,,, | Performed by: PODIATRIST

## 2022-05-25 PROCEDURE — 1101F PT FALLS ASSESS-DOCD LE1/YR: CPT | Mod: CPTII,S$GLB,, | Performed by: PODIATRIST

## 2022-05-25 PROCEDURE — 3288F FALL RISK ASSESSMENT DOCD: CPT | Mod: CPTII,S$GLB,, | Performed by: PODIATRIST

## 2022-05-25 PROCEDURE — 1159F MED LIST DOCD IN RCRD: CPT | Mod: CPTII,S$GLB,, | Performed by: PODIATRIST

## 2022-05-25 NOTE — PROGRESS NOTES
Subjective:      Patient ID: Avni Daniels is a 77 y.o. male.    Chief Complaint: Diabetes Mellitus (PCP  03/21/2022), Foot Problem, and Follow-up    Avni is a 77 y.o. male who presents to the clinic for evaluation and treatment of high risk feet. Avni has a past medical history of Diabetes mellitus, type 2 and Hypertension.  Complains of painful left foot callus and elongated, thickened toenails aggravated by increased weight bearing, shoe gear, pressure. Relates improvement to plantar fasciitis   This patient has documented high risk feet requiring routine maintenance secondary to diabetes mellitis and those secondary complications of diabetes, as mentioned..    PCP: Reyes Gusman MD    Date Last Seen by PCP:   Chief Complaint   Patient presents with    Diabetes Mellitus     PCP  03/21/2022    Foot Problem    Follow-up     Current shoe gear:  Casual rx shoes, new rx shoes for 3 months that he feels are uncomfortable      Hemoglobin A1c   Date Value Ref Range Status   07/07/2020 8.3 (H) <5.7 % of total Hgb Final     Comment:     For someone without known diabetes, a hemoglobin A1c  value of 6.5% or greater indicates that they may have   diabetes and this should be confirmed with a follow-up   test.    For someone with known diabetes, a value <7% indicates   that their diabetes is well controlled and a value   greater than or equal to 7% indicates suboptimal   control. A1c targets should be individualized based on   duration of diabetes, age, comorbid conditions, and   other considerations.    Currently, no consensus exists regarding use of  hemoglobin A1c for diagnosis of diabetes for children.       03/05/2020 7.9 (H) <5.7 % of total Hgb Final     Comment:     For someone without known diabetes, a hemoglobin A1c  value of 6.5% or greater indicates that they may have   diabetes and this should be confirmed with a follow-up   test.    For someone with known diabetes, a value <7% indicates  "  that their diabetes is well controlled and a value   greater than or equal to 7% indicates suboptimal   control. A1c targets should be individualized based on   duration of diabetes, age, comorbid conditions, and   other considerations.    Currently, no consensus exists regarding use of  hemoglobin A1c for diagnosis of diabetes for children.               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      atorvastatin (LIPITOR) 40 MG tablet       clopidogrel (PLAVIX) 75 mg tablet Take 75 mg by mouth once.       enalapril (VASOTEC) 5 MG tablet Take 5 mg by mouth once daily.       insulin NPH-insulin regular, 70/30, (NOVOLIN 70/30) 100 unit/mL (70-30) injection Inject 25 Units into the skin 2 (two) times daily.       insulin syringe-needle U-100 0.5 mL 31 gauge x 5/16 Syrg       insulin syringe-needle U-100 1/2 mL 30 gauge x 5/16 Syrg       meloxicam (MOBIC) 15 MG tablet Take 1 tablet (15 mg total) by mouth once daily. 1 pill per day everyday for the next 3 weeks with food 30 tablet 1    metoprolol succinate (TOPROL-XL) 25 MG 24 hr tablet Take 25 mg by mouth once daily.      metoprolol tartrate (LOPRESSOR) 25 MG tablet Take 25 mg by mouth Daily.      PACERONE 100 mg Tab Take 100 mg by mouth once daily.       THINPRO INSULIN SYRINGE 0.5 mL 31 x 3/8" Syrg       VITAMIN D2 1,250 mcg (50,000 unit) capsule TAKE 1 CAPSULE BY MOUTH ONCE EVERY MONTH       No current facility-administered medications on file prior to visit.       Review of patient's allergies indicates:  No Known Allergies    Past Surgical History:   Procedure Laterality Date    APPENDECTOMY      GALLBLADDER SURGERY      TUMOR REMOVAL Left     foot " "      No family history on file.    Social History     Socioeconomic History    Marital status:    Tobacco Use    Smoking status: Former Smoker    Smokeless tobacco: Former User         Review of Systems   Constitution: Negative for chills and fever.   Cardiovascular: Negative for claudication and leg swelling.   Respiratory: Negative for cough and shortness of breath.    Skin: Positive for dry skin, nail changes and poor wound healing (hx of left foot ulcer). Negative for itching and rash.   Musculoskeletal: Positive for joint pain. Negative for falls, joint swelling and muscle weakness.   Gastrointestinal: Negative for diarrhea, nausea and vomiting.   Neurological: Positive for numbness and paresthesias. Negative for tremors and weakness.   Psychiatric/Behavioral: Negative for altered mental status and hallucinations.           Objective:       Vitals:    05/25/22 0939   Weight: 67.6 kg (149 lb)   Height: 5' 11" (1.803 m)   PainSc: 0-No pain       Physical Exam   Constitutional:  Non-toxic appearance. He does not have a sickly appearance. No distress.   Pt. is well-developed, well-nourished, appears stated age, in no acute distress, alert and oriented x 3. No evidence of depression, anxiety, or agitation. Calm, cooperative, and communicative. Appropriate interactions and affect.   Cardiovascular:   Pulses:       Dorsalis pedis pulses are 1+ on the right side, and 1+ on the left side.        Posterior tibial pulses are 1+ on the right side, and 1+ on the left side.   Dorsalis pedis and posterior tibial pulses are diminished Bilaterally. Toes are cool to touch. Feet are warm proximally.There is decreased digital hair. Skin is atrophic   Pulmonary/Chest: No respiratory distress.   Musculoskeletal:        Right ankle: No tenderness. No lateral malleolus, no medial malleolus, no AITFL, no CF ligament and no posterior TFL tenderness found. Achilles tendon exhibits no pain, no defect and normal Elias's test " results.        Left ankle: No tenderness. No lateral malleolus, no medial malleolus, no AITFL, no CF ligament and no posterior TFL tenderness found. Achilles tendon exhibits no pain, no defect and normal Elias's test results.        Right foot: There is no tenderness and no bony tenderness.        Left foot: There is pain on palpation medial calcaneal tubercle at origin of plantar fascia. No tenderness with compression of heel. Negative tinels sign.   Decreased stride, station of gait.  apropulsive toe off.  Increased angle and base of gait.    Patient has hammertoes of digits 2-5 bilateral partially reducible without symptom today.    Visible and palpable bunion without pain at dorsomedial 1st metatarsal head right and left.  Hallux abducted right and left partially reducible, tracks laterally without being track bound.  No ecchymosis, erythema, edema, or cardinal signs infection or signs of trauma same foot.    Visible and palpable tailors bunion at dorsolateral 5th metatarsal head right and left.  5th digit is varus right and left partially reducible. No ecchymosis, erythema, edema, or cardinal signs infection.    Fat pad atrophy to heels and met heads bilateral     Lymphadenopathy:   No lymphatic streaking    Negative lymphadenopathy bilateral popliteal fossa and tarsal tunnel.     Neurological:   Nazareth-Tamanna 5.07 monofilamant testing is diminished Ryan feet. Decreased/absent vibratory sensation bilateral feet to 128Hz tuning fork.     Paresthesias, and hyperesthesia bilateral feet with no clearly identified trigger or source.    Skin: Skin is warm and dry. No abrasion, no burn, no ecchymosis, no laceration, no petechiae and no rash noted. He is not diaphoretic. No cyanosis. No pallor. Nails show no clubbing.   Skin is thin, atrophic, xerotic    Toenails 1-5 bilaterally are elongated by 2-3 mm, thickened by 2-3 mm, discolored/yellowed, dystrophic, brittle with subungual debris.    Focal hyperkeratotic  lesion with painful central core consisting entirely of hyperkeratotic tissue without open skin, drainage, pus, fluctuance, malodor, or signs of infection:  sub 1st and 5th MTPJ luann     Psychiatric: His mood appears not anxious. His affect is not inappropriate. His speech is not slurred. He is not combative. He is communicative. He is attentive.   Nursing note reviewed.        Assessment:       Encounter Diagnoses   Name Primary?    Type II diabetes mellitus with neurological manifestations Yes    Hammer toes of both feet     Hallux abducto valgus, left     Hallux abducto valgus, right     Plantar fat pad atrophy     Corn or callus     Onychomycosis due to dermatophyte          Plan:       Avni was seen today for diabetes mellitus, foot problem and follow-up.    Diagnoses and all orders for this visit:    Type II diabetes mellitus with neurological manifestations    Hammer toes of both feet    Hallux abducto valgus, left    Hallux abducto valgus, right    Plantar fat pad atrophy    Corn or callus    Onychomycosis due to dermatophyte      I counseled the patient on his conditions, their implications and medical management.    Shoe inspection. Diabetic Foot Education. Patient reminded of the importance of good nutrition and blood sugar control to help prevent podiatric complications of diabetes. Patient instructed on proper foot hygeine. We discussed wearing proper shoe gear, daily foot inspections, never walking without protective shoe gear, never putting sharp instruments to feet.      Significant difference in pain reaction on physical exam today than on previous encounter.    Encouraged patient to stretch aggressively. Continue to ice as needed    With patient's permission, nails were aggressively reduced and debrided x 10 to their soft tissue attachment mechanically and with electric , removing all offending nail and debris. Patient relates relief following the procedure.     After cleansing the   area w/ alcohol prep pad the above mentioned hyperkeratosis was trimmed utilizing No 15 scapel, to a smooth base with out incident. Patient tolerated this  well and reported comfort to the area of sub 1st and 5th MTPJ bilaterally    He will continue to monitor the areas daily, inspect her feet, wear protective shoe gear when ambulatory, moisturizer to maintain skin integrity and follow in this office in approximately  2-3 months, sooner p.r.n.

## 2022-08-31 ENCOUNTER — OFFICE VISIT (OUTPATIENT)
Dept: PODIATRY | Facility: CLINIC | Age: 77
End: 2022-08-31
Payer: MEDICARE

## 2022-08-31 ENCOUNTER — HOSPITAL ENCOUNTER (OUTPATIENT)
Dept: RADIOLOGY | Facility: HOSPITAL | Age: 77
Discharge: HOME | End: 2022-08-31
Attending: PODIATRIST
Payer: MEDICARE

## 2022-08-31 VITALS — BODY MASS INDEX: 20.86 KG/M2 | HEIGHT: 71 IN | WEIGHT: 149 LBS

## 2022-08-31 DIAGNOSIS — L90.9 PLANTAR FAT PAD ATROPHY: ICD-10-CM

## 2022-08-31 DIAGNOSIS — M20.11 HALLUX ABDUCTO VALGUS, RIGHT: ICD-10-CM

## 2022-08-31 DIAGNOSIS — B35.1 ONYCHOMYCOSIS DUE TO DERMATOPHYTE: ICD-10-CM

## 2022-08-31 DIAGNOSIS — E11.49 TYPE II DIABETES MELLITUS WITH NEUROLOGICAL MANIFESTATIONS: Primary | ICD-10-CM

## 2022-08-31 DIAGNOSIS — M25.472 LEFT ANKLE SWELLING: ICD-10-CM

## 2022-08-31 DIAGNOSIS — L84 CORN OR CALLUS: ICD-10-CM

## 2022-08-31 DIAGNOSIS — M20.12 HALLUX ABDUCTO VALGUS, LEFT: ICD-10-CM

## 2022-08-31 DIAGNOSIS — M20.41 HAMMER TOES OF BOTH FEET: ICD-10-CM

## 2022-08-31 DIAGNOSIS — M20.42 HAMMER TOES OF BOTH FEET: ICD-10-CM

## 2022-08-31 PROCEDURE — 73610 XR ANKLE COMPLETE 3 VIEW LEFT: ICD-10-PCS | Mod: 26,LT,, | Performed by: INTERNAL MEDICINE

## 2022-08-31 PROCEDURE — 1159F PR MEDICATION LIST DOCUMENTED IN MEDICAL RECORD: ICD-10-PCS | Mod: CPTII,S$GLB,, | Performed by: PODIATRIST

## 2022-08-31 PROCEDURE — 1160F RVW MEDS BY RX/DR IN RCRD: CPT | Mod: CPTII,S$GLB,, | Performed by: PODIATRIST

## 2022-08-31 PROCEDURE — 99999 PR PBB SHADOW E&M-EST. PATIENT-LVL III: ICD-10-PCS | Mod: PBBFAC,,, | Performed by: PODIATRIST

## 2022-08-31 PROCEDURE — 11721 PR DEBRIDEMENT OF NAILS, 6 OR MORE: ICD-10-PCS | Mod: Q9,59,S$GLB, | Performed by: PODIATRIST

## 2022-08-31 PROCEDURE — 99214 PR OFFICE/OUTPT VISIT, EST, LEVL IV, 30-39 MIN: ICD-10-PCS | Mod: 25,S$GLB,, | Performed by: PODIATRIST

## 2022-08-31 PROCEDURE — 1160F PR REVIEW ALL MEDS BY PRESCRIBER/CLIN PHARMACIST DOCUMENTED: ICD-10-PCS | Mod: CPTII,S$GLB,, | Performed by: PODIATRIST

## 2022-08-31 PROCEDURE — 1101F PT FALLS ASSESS-DOCD LE1/YR: CPT | Mod: CPTII,S$GLB,, | Performed by: PODIATRIST

## 2022-08-31 PROCEDURE — 1126F PR PAIN SEVERITY QUANTIFIED, NO PAIN PRESENT: ICD-10-PCS | Mod: CPTII,S$GLB,, | Performed by: PODIATRIST

## 2022-08-31 PROCEDURE — 11721 DEBRIDE NAIL 6 OR MORE: CPT | Mod: Q9,59,S$GLB, | Performed by: PODIATRIST

## 2022-08-31 PROCEDURE — 11056 PR TRIM BENIGN HYPERKERATOTIC SKIN LESION,2-4: ICD-10-PCS | Mod: Q9,S$GLB,, | Performed by: PODIATRIST

## 2022-08-31 PROCEDURE — 1126F AMNT PAIN NOTED NONE PRSNT: CPT | Mod: CPTII,S$GLB,, | Performed by: PODIATRIST

## 2022-08-31 PROCEDURE — 1101F PR PT FALLS ASSESS DOC 0-1 FALLS W/OUT INJ PAST YR: ICD-10-PCS | Mod: CPTII,S$GLB,, | Performed by: PODIATRIST

## 2022-08-31 PROCEDURE — 73610 X-RAY EXAM OF ANKLE: CPT | Mod: 26,LT,, | Performed by: INTERNAL MEDICINE

## 2022-08-31 PROCEDURE — 99999 PR PBB SHADOW E&M-EST. PATIENT-LVL III: CPT | Mod: PBBFAC,,, | Performed by: PODIATRIST

## 2022-08-31 PROCEDURE — 73610 X-RAY EXAM OF ANKLE: CPT | Mod: TC,FY,PO,LT

## 2022-08-31 PROCEDURE — 99214 OFFICE O/P EST MOD 30 MIN: CPT | Mod: 25,S$GLB,, | Performed by: PODIATRIST

## 2022-08-31 PROCEDURE — 3288F PR FALLS RISK ASSESSMENT DOCUMENTED: ICD-10-PCS | Mod: CPTII,S$GLB,, | Performed by: PODIATRIST

## 2022-08-31 PROCEDURE — 1159F MED LIST DOCD IN RCRD: CPT | Mod: CPTII,S$GLB,, | Performed by: PODIATRIST

## 2022-08-31 PROCEDURE — 11056 PARNG/CUTG B9 HYPRKR LES 2-4: CPT | Mod: Q9,S$GLB,, | Performed by: PODIATRIST

## 2022-08-31 PROCEDURE — 3288F FALL RISK ASSESSMENT DOCD: CPT | Mod: CPTII,S$GLB,, | Performed by: PODIATRIST

## 2022-08-31 NOTE — PATIENT INSTRUCTIONS
Over the counter pain creams: Voltaren Gel, Biofreeze, Bengay, tiger balm, two old goat, lidocaine gel,  Absorbine Veterinary Liniment Gel Topical Analgesic Sore Muscle and Joint Pain Relief    Recommend lotions: eucerin, eucerin for diabetics, aquaphor, A&D ointment, gold bond for diabetics, sween, Nitro's Bees all purpose baby ointment,  urea 40 with aloe (found on amazon.com)    Shoe recommendations: (try 6pm.com, zappos.com , nordstromrack.iBoxPay, or shoes.com for discounted prices) you can visit [employer] shoes in [place]  or [employer] [facility] in the [facility] (there are also several shoe brand outlets in the [facility])    Asics (GT 2000 or gel foundations), new balance stability type shoes (such as the 940 series), saucony (stabil c3),  Drew (GTS or Beast or transcend), propet (tennis shoe)    Irvin Knox (women) Donna&Mich (men), clarks, crocs, aerosoles, naturalizers, SAS, ecco, born, ant good, rockports (dress shoes)    Vionic, burkenstocks, fitflops, propet (sandals)  Nike comfort thong sandals, crocs, propet (house shoes)    Nail Home remedy:  Vicks Vapor rub or Emuaid to nails for easier manageability    Diabetes: Inspecting Your Feet  Diabetes increases your chances of developing foot problems. So inspect your feet every day. This helps you find small skin irritations before they become serious infections. If you have trouble seeing the bottoms of your feet, use a mirror or ask a family member or friend to help.     Pressure spots on the bottom of the foot are common areas where problems develop.   How to check your feet  Below are tips to help you look for foot problems. Try to check your feet at the same time each day, such as when you get out of bed in the morning:  Check the top of each foot. The tops of toes, back of the heel, and outer edge of the foot can get a lot of rubbing from poor-fitting shoes.  Check the bottom of each foot. Daily wear and tear often leads to problems at pressure spots.  Check  the toes and nails. Fungal infections often occur between toes. Toenail problems can also be a sign of fungal infections or lead to breaks in the skin.  Check your shoes, too. Loose objects inside a shoe can injure the foot. Use your hand to feel inside your shoes for things like robert, loose stitching, or rough areas that could irritate your skin.  Warning signs  Look for any color changes in the foot. Redness with streaks can signal a severe infection, which needs immediate medical attention. Tell your doctor right away if you have any of these problems:  Swelling, sometimes with color changes, may be a sign of poor blood flow or infection. Symptoms include tenderness and an increase in the size of your foot.  Warm or hot areas on your feet may be signs of infection. A foot that is cold may not be getting enough blood.  Sensations such as burning, tingling, or pins and needles can be signs of a problem. Also check for areas that may be numb.  Hot spots are caused by friction or pressure. Look for hot spots in areas that get a lot of rubbing. Hot spots can turn into blisters, calluses, or sores.  Cracks and sores are caused by dry or irritated skin. They are a sign that the skin is breaking down, which can lead to infection.  Toenail problems to watch for include nails growing into the skin (ingrown toenail) and causing redness or pain. Thick, yellow, or discolored nails can signal a fungal infection.  Drainage and odor can develop from untreated sores and ulcers. Call your doctor right away if you notice white or yellow drainage, bleeding, or unpleasant odor.   © 5459-6798 [employer]. [address] [place] [address]. All rights reserved. This information is not intended as a substitute for professional medical care. Always follow your healthcare professional's instructions.        Step-by-Step:  Inspecting Your Feet (Diabetes)    Date Last Reviewed: 10/1/2016  © 7516-5285 [employer]. [address] [place] [address], [place] [address]. All rights reserved. This information is not intended as a substitute for professional medical care. Always follow your healthcare professional's instructions.

## 2022-08-31 NOTE — PROGRESS NOTES
Subjective:      Patient ID: Avni Daniels is a 77 y.o. male.    Chief Complaint: Diabetes Mellitus (PCP  03/21/2022) and Nail Care    Avni is a 77 y.o. male who presents to the clinic for evaluation and treatment of high risk feet. Avni has a past medical history of Diabetes mellitus, type 2 and Hypertension.  Complains of painful left foot callus and elongated, thickened toenails aggravated by increased weight bearing, shoe gear, pressure. Relates improvement to plantar fasciitis   This patient has documented high risk feet requiring routine maintenance secondary to diabetes mellitis and those secondary complications of diabetes, as mentioned..      8/31/2022 presents to clinic for high risk foot exam complaining left ankle swelling.  Nonpainful secondary to neuropathy but making it difficult to don his compression sock.  Denies trauma, denies increased sodium intact, denies elevated BP.    PCP: Reyes Gusman MD    Date Last Seen by PCP:   Chief Complaint   Patient presents with    Diabetes Mellitus     PCP  03/21/2022    Nail Care     Current shoe gear:  Casual rx shoes    Hemoglobin A1c   Date Value Ref Range Status   07/07/2020 8.3 (H) <5.7 % of total Hgb Final     Comment:     For someone without known diabetes, a hemoglobin A1c  value of 6.5% or greater indicates that they may have   diabetes and this should be confirmed with a follow-up   test.    For someone with known diabetes, a value <7% indicates   that their diabetes is well controlled and a value   greater than or equal to 7% indicates suboptimal   control. A1c targets should be individualized based on   duration of diabetes, age, comorbid conditions, and   other considerations.    Currently, no consensus exists regarding use of  hemoglobin A1c for diagnosis of diabetes for children.       03/05/2020 7.9 (H) <5.7 % of total Hgb Final     Comment:     For someone without known diabetes, a hemoglobin A1c  value of 6.5% or greater  "indicates that they may have   diabetes and this should be confirmed with a follow-up   test.    For someone with known diabetes, a value <7% indicates   that their diabetes is well controlled and a value   greater than or equal to 7% indicates suboptimal   control. A1c targets should be individualized based on   duration of diabetes, age, comorbid conditions, and   other considerations.    Currently, no consensus exists regarding use of  hemoglobin A1c for diagnosis of diabetes for children.               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      atorvastatin (LIPITOR) 40 MG tablet       clopidogrel (PLAVIX) 75 mg tablet Take 75 mg by mouth once.       enalapril (VASOTEC) 5 MG tablet Take 5 mg by mouth once daily.       insulin NPH-insulin regular, 70/30, (NOVOLIN 70/30) 100 unit/mL (70-30) injection Inject 25 Units into the skin 2 (two) times daily.       insulin syringe-needle U-100 0.5 mL 31 gauge x 5/16 Syrg       insulin syringe-needle U-100 1/2 mL 30 gauge x 5/16 Syrg       meloxicam (MOBIC) 15 MG tablet Take 1 tablet (15 mg total) by mouth once daily. 1 pill per day everyday for the next 3 weeks with food 30 tablet 1    metoprolol succinate (TOPROL-XL) 25 MG 24 hr tablet Take 25 mg by mouth once daily.      metoprolol tartrate (LOPRESSOR) 25 MG tablet Take 25 mg by mouth Daily.      PACERONE 100 mg Tab Take 100 mg by mouth once daily.       THINPRO INSULIN SYRINGE 0.5 mL 31 x 3/8" Syrg       VITAMIN D2 1,250 mcg (50,000 unit) capsule TAKE 1 CAPSULE BY MOUTH ONCE EVERY MONTH       No current facility-administered medications on file prior to visit.       Review of patient's allergies indicates:  No Known " "Allergies    Past Surgical History:   Procedure Laterality Date    APPENDECTOMY      GALLBLADDER SURGERY      TUMOR REMOVAL Left     foot       History reviewed. No pertinent family history.    Social History     Socioeconomic History    Marital status:    Tobacco Use    Smoking status: Former    Smokeless tobacco: Former         Review of Systems   Constitution: Negative for chills and fever.   Cardiovascular: Negative for claudication and leg swelling.   Respiratory: Negative for cough and shortness of breath.    Skin: Positive for dry skin, nail changes and poor wound healing (hx of left foot ulcer). Negative for itching and rash.   Musculoskeletal: Positive for joint pain. Negative for falls, joint swelling and muscle weakness.   Gastrointestinal: Negative for diarrhea, nausea and vomiting.   Neurological: Positive for numbness and paresthesias. Negative for tremors and weakness.   Psychiatric/Behavioral: Negative for altered mental status and hallucinations.           Objective:       Vitals:    08/31/22 0942   Weight: 67.6 kg (149 lb)   Height: 5' 11" (1.803 m)   PainSc: 0-No pain       Physical Exam   Constitutional:  Non-toxic appearance. He does not have a sickly appearance. No distress.   Pt. is well-developed, well-nourished, appears stated age, in no acute distress, alert and oriented x 3. No evidence of depression, anxiety, or agitation. Calm, cooperative, and communicative. Appropriate interactions and affect.   Cardiovascular:   Pulses:       Dorsalis pedis pulses are 1+ on the right side, and 1+ on the left side.        Posterior tibial pulses are 1+ on the right side, and 1+ on the left side.   Dorsalis pedis and posterior tibial pulses are diminished Bilaterally. Toes are cool to touch. Feet are warm proximally.There is decreased digital hair. Skin is atrophic   Pulmonary/Chest: No respiratory distress.   Musculoskeletal:        Right ankle: No tenderness. No lateral malleolus, no medial " malleolus, no AITFL, no CF ligament and no posterior TFL tenderness found. Achilles tendon exhibits no pain, no defect and normal Elias's test results.        Left ankle: edema and tenderness to palpation to lateral and anterior ankle. Achilles tendon exhibits no pain, no defect and normal Elias's test results.        Right foot: There is no tenderness and no bony tenderness.        Left foot: There is no tenderness and no bony tenderness.   Decreased stride, station of gait.  apropulsive toe off.  Increased angle and base of gait.    Patient has hammertoes of digits 2-5 bilateral partially reducible without symptom today.    Visible and palpable bunion without pain at dorsomedial 1st metatarsal head right and left.  Hallux abducted right and left partially reducible, tracks laterally without being track bound.  No ecchymosis, erythema, edema, or cardinal signs infection or signs of trauma same foot.    Visible and palpable tailors bunion at dorsolateral 5th metatarsal head right and left.  5th digit is varus right and left partially reducible. No ecchymosis, erythema, edema, or cardinal signs infection.    Fat pad atrophy to heels and met heads bilateral     Lymphadenopathy:   No lymphatic streaking    Negative lymphadenopathy bilateral popliteal fossa and tarsal tunnel.     Neurological:   Huntley-Tamanna 5.07 monofilamant testing is diminished Ryan feet. Decreased/absent vibratory sensation bilateral feet to 128Hz tuning fork.     Paresthesias, and hyperesthesia bilateral feet with no clearly identified trigger or source.    Skin: Skin is warm and dry. No abrasion, no burn, no ecchymosis, no laceration, no petechiae and no rash noted. He is not diaphoretic. No cyanosis. No pallor. Nails show no clubbing.   Skin is thin, atrophic, xerotic    Toenails 1-5 bilaterally are elongated by 2-3 mm, thickened by 2-3 mm, discolored/yellowed, dystrophic, brittle with subungual debris.    Focal hyperkeratotic lesion  with painful central core consisting entirely of hyperkeratotic tissue without open skin, drainage, pus, fluctuance, malodor, or signs of infection:  sub 1st and 5th MTPJ luann     Psychiatric: His mood appears not anxious. His affect is not inappropriate. His speech is not slurred. He is not combative. He is communicative. He is attentive.   Nursing note reviewed.        Assessment:       Encounter Diagnoses   Name Primary?    Type II diabetes mellitus with neurological manifestations Yes    Hammer toes of both feet     Hallux abducto valgus, left     Hallux abducto valgus, right     Plantar fat pad atrophy     Corn or callus     Onychomycosis due to dermatophyte     Left ankle swelling          Plan:       Avni was seen today for diabetes mellitus and nail care.    Diagnoses and all orders for this visit:    Type II diabetes mellitus with neurological manifestations  -     DIABETIC SHOES FOR HOME USE    Hammer toes of both feet  -     DIABETIC SHOES FOR HOME USE    Hallux abducto valgus, left  -     DIABETIC SHOES FOR HOME USE    Hallux abducto valgus, right  -     DIABETIC SHOES FOR HOME USE    Plantar fat pad atrophy  -     DIABETIC SHOES FOR HOME USE    Corn or callus  -     DIABETIC SHOES FOR HOME USE    Onychomycosis due to dermatophyte    Left ankle swelling  -     X-Ray Ankle Complete 3 View Left; Future    I counseled the patient on his conditions, their implications and medical management.    Shoe inspection. Diabetic Foot Education. Patient reminded of the importance of good nutrition and blood sugar control to help prevent podiatric complications of diabetes. Patient instructed on proper foot hygeine. We discussed wearing proper shoe gear, daily foot inspections, never walking without protective shoe gear, never putting sharp instruments to feet.      I am concerned about sudden swelling and slight discomfort to the LLE.  Xray ordered to rule out fracture.  On review there are jd abnormalities noted at  fibula but will await radiologist read.  Informed patient he may need to return for a Boot.  In the meantime he is to RICE the limb.     With patient's permission, nails were aggressively reduced and debrided x 10 to their soft tissue attachment mechanically and with electric , removing all offending nail and debris. Patient relates relief following the procedure.     After cleansing the  area w/ alcohol prep pad the above mentioned hyperkeratosis was trimmed utilizing No 15 scapel, to a smooth base with out incident. Patient tolerated this  well and reported comfort to the area of sub 1st and 5th MTPJ bilaterally    He will continue to monitor the areas daily, inspect her feet, wear protective shoe gear when ambulatory, moisturizer to maintain skin integrity and follow in this office in approximately  2-3 months, sooner p.r.n.

## 2022-12-15 ENCOUNTER — OFFICE VISIT (OUTPATIENT)
Dept: PODIATRY | Facility: CLINIC | Age: 77
End: 2022-12-15
Payer: MEDICARE

## 2022-12-15 VITALS — WEIGHT: 149 LBS | BODY MASS INDEX: 20.86 KG/M2 | HEIGHT: 71 IN

## 2022-12-15 DIAGNOSIS — B35.1 ONYCHOMYCOSIS DUE TO DERMATOPHYTE: ICD-10-CM

## 2022-12-15 DIAGNOSIS — L84 CORN OR CALLUS: ICD-10-CM

## 2022-12-15 DIAGNOSIS — E11.49 TYPE II DIABETES MELLITUS WITH NEUROLOGICAL MANIFESTATIONS: Primary | ICD-10-CM

## 2022-12-15 PROCEDURE — 99999 PR PBB SHADOW E&M-EST. PATIENT-LVL IV: CPT | Mod: PBBFAC,,, | Performed by: PODIATRIST

## 2022-12-15 PROCEDURE — 1159F MED LIST DOCD IN RCRD: CPT | Mod: CPTII,S$GLB,, | Performed by: PODIATRIST

## 2022-12-15 PROCEDURE — 1101F PR PT FALLS ASSESS DOC 0-1 FALLS W/OUT INJ PAST YR: ICD-10-PCS | Mod: CPTII,S$GLB,, | Performed by: PODIATRIST

## 2022-12-15 PROCEDURE — 1160F RVW MEDS BY RX/DR IN RCRD: CPT | Mod: CPTII,S$GLB,, | Performed by: PODIATRIST

## 2022-12-15 PROCEDURE — 3288F PR FALLS RISK ASSESSMENT DOCUMENTED: ICD-10-PCS | Mod: CPTII,S$GLB,, | Performed by: PODIATRIST

## 2022-12-15 PROCEDURE — 1125F AMNT PAIN NOTED PAIN PRSNT: CPT | Mod: CPTII,S$GLB,, | Performed by: PODIATRIST

## 2022-12-15 PROCEDURE — 99999 PR PBB SHADOW E&M-EST. PATIENT-LVL IV: ICD-10-PCS | Mod: PBBFAC,,, | Performed by: PODIATRIST

## 2022-12-15 PROCEDURE — 11721 PR DEBRIDEMENT OF NAILS, 6 OR MORE: ICD-10-PCS | Mod: 59,Q9,S$GLB, | Performed by: PODIATRIST

## 2022-12-15 PROCEDURE — 1160F PR REVIEW ALL MEDS BY PRESCRIBER/CLIN PHARMACIST DOCUMENTED: ICD-10-PCS | Mod: CPTII,S$GLB,, | Performed by: PODIATRIST

## 2022-12-15 PROCEDURE — 11056 PARNG/CUTG B9 HYPRKR LES 2-4: CPT | Mod: Q9,S$GLB,, | Performed by: PODIATRIST

## 2022-12-15 PROCEDURE — 11721 DEBRIDE NAIL 6 OR MORE: CPT | Mod: 59,Q9,S$GLB, | Performed by: PODIATRIST

## 2022-12-15 PROCEDURE — 99499 UNLISTED E&M SERVICE: CPT | Mod: S$GLB,,, | Performed by: PODIATRIST

## 2022-12-15 PROCEDURE — 1101F PT FALLS ASSESS-DOCD LE1/YR: CPT | Mod: CPTII,S$GLB,, | Performed by: PODIATRIST

## 2022-12-15 PROCEDURE — 11056 PR TRIM BENIGN HYPERKERATOTIC SKIN LESION,2-4: ICD-10-PCS | Mod: Q9,S$GLB,, | Performed by: PODIATRIST

## 2022-12-15 PROCEDURE — 3288F FALL RISK ASSESSMENT DOCD: CPT | Mod: CPTII,S$GLB,, | Performed by: PODIATRIST

## 2022-12-15 PROCEDURE — 1159F PR MEDICATION LIST DOCUMENTED IN MEDICAL RECORD: ICD-10-PCS | Mod: CPTII,S$GLB,, | Performed by: PODIATRIST

## 2022-12-15 PROCEDURE — 99499 NO LOS: ICD-10-PCS | Mod: S$GLB,,, | Performed by: PODIATRIST

## 2022-12-15 PROCEDURE — 1125F PR PAIN SEVERITY QUANTIFIED, PAIN PRESENT: ICD-10-PCS | Mod: CPTII,S$GLB,, | Performed by: PODIATRIST

## 2022-12-15 NOTE — PATIENT INSTRUCTIONS
..Over the counter pain creams: Voltaren Gel, Biofreeze, Bengay, tiger balm, two old goat, lidocaine gel,  Absorbine Veterinary Liniment Gel Topical Analgesic Sore Muscle and Joint Pain Relief    Recommend lotions: eucerin, eucerin for diabetics, aquaphor, A&D ointment, gold bond for diabetics, sween, Wilson's Bees all purpose baby ointment,  urea 40 with aloe (found on amazon.com)    Shoe recommendations: (try 6pm.com, zappos.com , nordstromrack.FanDuel, or shoes.com for discounted prices) you can visit [employer] in [place]  or [facility] in the [facility] (there are also several shoe brand outlets in the [facility])    Asics (GT 2000 or gel foundations), new balance stability type shoes (such as the 940 series), saucony (stabil c3),  Drew (GTS or Beast or transcend), propet (tennis shoe)    Irvin Knox (women) Donna&Mich (men), clarks, crocs, aerosoles, naturalizers, SAS, ecco, born, ant good, rockports (dress shoes)    Vionic, burkenstocks, fitflops, propet (sandals)  Nike comfort thong sandals, crocs, propet (house shoes)    Nail Home remedy:  Vicks Vapor rub or Emuaid to nails for easier manageability    Diabetes: Inspecting Your Feet  Diabetes increases your chances of developing foot problems. So inspect your feet every day. This helps you find small skin irritations before they become serious infections. If you have trouble seeing the bottoms of your feet, use a mirror or ask a family member or friend to help.     Pressure spots on the bottom of the foot are common areas where problems develop.   How to check your feet  Below are tips to help you look for foot problems. Try to check your feet at the same time each day, such as when you get out of bed in the morning:  Check the top of each foot. The tops of toes, back of the heel, and outer edge of the foot can get a lot of rubbing from poor-fitting shoes.  Check the bottom of each foot. Daily wear and tear often leads to problems at pressure  spots.  Check the toes and nails. Fungal infections often occur between toes. Toenail problems can also be a sign of fungal infections or lead to breaks in the skin.  Check your shoes, too. Loose objects inside a shoe can injure the foot. Use your hand to feel inside your shoes for things like robert, loose stitching, or rough areas that could irritate your skin.  Warning signs  Look for any color changes in the foot. Redness with streaks can signal a severe infection, which needs immediate medical attention. Tell your doctor right away if you have any of these problems:  Swelling, sometimes with color changes, may be a sign of poor blood flow or infection. Symptoms include tenderness and an increase in the size of your foot.  Warm or hot areas on your feet may be signs of infection. A foot that is cold may not be getting enough blood.  Sensations such as burning, tingling, or pins and needles can be signs of a problem. Also check for areas that may be numb.  Hot spots are caused by friction or pressure. Look for hot spots in areas that get a lot of rubbing. Hot spots can turn into blisters, calluses, or sores.  Cracks and sores are caused by dry or irritated skin. They are a sign that the skin is breaking down, which can lead to infection.  Toenail problems to watch for include nails growing into the skin (ingrown toenail) and causing redness or pain. Thick, yellow, or discolored nails can signal a fungal infection.  Drainage and odor can develop from untreated sores and ulcers. Call your doctor right away if you notice white or yellow drainage, bleeding, or unpleasant odor.   © 7934-8525 [employer]. [address] [place] [id number]. All rights reserved. This information is not intended as a substitute for professional medical care. Always follow your healthcare professional's instructions.        Step-by-Step:  Inspecting Your Feet (Diabetes)    Date Last Reviewed: 10/1/2016  © 3636-4787  [employer]. [address] [place] [address], [place] [address]. All rights reserved. This information is not intended as a substitute for professional medical care. Always follow your healthcare professional's instructions.

## 2022-12-27 NOTE — PROGRESS NOTES
Subjective:      Patient ID: Avni Daniels is a 77 y.o. male.    Chief Complaint: Diabetes Mellitus (PCP  09/27/2022), Nail Care, and Foot Problem (Bilateral foot pain )    Avni is a 77 y.o. male who presents to the clinic for evaluation and treatment of high risk feet. Avni has a past medical history of Diabetes mellitus, type 2 and Hypertension.  Complains of painful left foot callus and elongated, thickened toenails aggravated by increased weight bearing, shoe gear, pressure. Relates improvement to plantar fasciitis   This patient has documented high risk feet requiring routine maintenance secondary to diabetes mellitis and those secondary complications of diabetes, as mentioned..    PCP: Reyes Gusman MD    Date Last Seen by PCP:   Chief Complaint   Patient presents with    Diabetes Mellitus     PCP  09/27/2022    Nail Care    Foot Problem     Bilateral foot pain      Current shoe gear:  Casual rx shoes, new rx shoes for 3 months that he feels are uncomfortable      Hemoglobin A1c   Date Value Ref Range Status   07/07/2020 8.3 (H) <5.7 % of total Hgb Final     Comment:     For someone without known diabetes, a hemoglobin A1c  value of 6.5% or greater indicates that they may have   diabetes and this should be confirmed with a follow-up   test.    For someone with known diabetes, a value <7% indicates   that their diabetes is well controlled and a value   greater than or equal to 7% indicates suboptimal   control. A1c targets should be individualized based on   duration of diabetes, age, comorbid conditions, and   other considerations.    Currently, no consensus exists regarding use of  hemoglobin A1c for diagnosis of diabetes for children.       03/05/2020 7.9 (H) <5.7 % of total Hgb Final     Comment:     For someone without known diabetes, a hemoglobin A1c  value of 6.5% or greater indicates that they may have   diabetes and this should be confirmed with a follow-up   test.    For someone  "with known diabetes, a value <7% indicates   that their diabetes is well controlled and a value   greater than or equal to 7% indicates suboptimal   control. A1c targets should be individualized based on   duration of diabetes, age, comorbid conditions, and   other considerations.    Currently, no consensus exists regarding use of  hemoglobin A1c for diagnosis of diabetes for children.               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      atorvastatin (LIPITOR) 40 MG tablet       clopidogrel (PLAVIX) 75 mg tablet Take 75 mg by mouth once.       enalapril (VASOTEC) 5 MG tablet Take 5 mg by mouth once daily.       insulin NPH-insulin regular, 70/30, (NOVOLIN 70/30) 100 unit/mL (70-30) injection Inject 25 Units into the skin 2 (two) times daily.       insulin syringe-needle U-100 0.5 mL 31 gauge x 5/16 Syrg       insulin syringe-needle U-100 1/2 mL 30 gauge x 5/16 Syrg       meloxicam (MOBIC) 15 MG tablet Take 1 tablet (15 mg total) by mouth once daily. 1 pill per day everyday for the next 3 weeks with food 30 tablet 1    metoprolol succinate (TOPROL-XL) 25 MG 24 hr tablet Take 25 mg by mouth once daily.      metoprolol tartrate (LOPRESSOR) 25 MG tablet Take 25 mg by mouth Daily.      PACERONE 100 mg Tab Take 100 mg by mouth once daily.       THINPRO INSULIN SYRINGE 0.5 mL 31 x 3/8" Syrg       VITAMIN D2 1,250 mcg (50,000 unit) capsule TAKE 1 CAPSULE BY MOUTH ONCE EVERY MONTH       No current facility-administered medications on file prior to visit.       Review of patient's allergies indicates:  No Known Allergies    Past Surgical History:   Procedure Laterality Date    APPENDECTOMY      GALLBLADDER SURGERY      TUMOR " "REMOVAL Left     foot       History reviewed. No pertinent family history.    Social History     Socioeconomic History    Marital status:    Tobacco Use    Smoking status: Former    Smokeless tobacco: Former         Review of Systems   Constitution: Negative for chills and fever.   Cardiovascular: Negative for claudication and leg swelling.   Respiratory: Negative for cough and shortness of breath.    Skin: Positive for dry skin, nail changes and poor wound healing (hx of left foot ulcer). Negative for itching and rash.   Musculoskeletal: Positive for joint pain. Negative for falls, joint swelling and muscle weakness.   Gastrointestinal: Negative for diarrhea, nausea and vomiting.   Neurological: Positive for numbness and paresthesias. Negative for tremors and weakness.   Psychiatric/Behavioral: Negative for altered mental status and hallucinations.           Objective:       Vitals:    12/15/22 1038   Weight: 67.6 kg (149 lb)   Height: 5' 11" (1.803 m)   PainSc:   3   PainLoc: Foot       Physical Exam   Constitutional:  Non-toxic appearance. He does not have a sickly appearance. No distress.   Pt. is well-developed, well-nourished, appears stated age, in no acute distress, alert and oriented x 3. No evidence of depression, anxiety, or agitation. Calm, cooperative, and communicative. Appropriate interactions and affect.   Cardiovascular:   Pulses:       Dorsalis pedis pulses are 1+ on the right side, and 1+ on the left side.        Posterior tibial pulses are 1+ on the right side, and 1+ on the left side.   Dorsalis pedis and posterior tibial pulses are diminished Bilaterally. Toes are cool to touch. Feet are warm proximally.There is decreased digital hair. Skin is atrophic   Pulmonary/Chest: No respiratory distress.   Musculoskeletal:        Right ankle: No tenderness. No lateral malleolus, no medial malleolus, no AITFL, no CF ligament and no posterior TFL tenderness found. Achilles tendon exhibits no pain, " no defect and normal Elias's test results.        Left ankle: No tenderness. No lateral malleolus, no medial malleolus, no AITFL, no CF ligament and no posterior TFL tenderness found. Achilles tendon exhibits no pain, no defect and normal Elias's test results.        Right foot: There is no tenderness and no bony tenderness.        Left foot: There is pain on palpation medial calcaneal tubercle at origin of plantar fascia. No tenderness with compression of heel. Negative tinels sign.   Decreased stride, station of gait.  apropulsive toe off.  Increased angle and base of gait.    Patient has hammertoes of digits 2-5 bilateral partially reducible without symptom today.    Visible and palpable bunion without pain at dorsomedial 1st metatarsal head right and left.  Hallux abducted right and left partially reducible, tracks laterally without being track bound.  No ecchymosis, erythema, edema, or cardinal signs infection or signs of trauma same foot.    Visible and palpable tailors bunion at dorsolateral 5th metatarsal head right and left.  5th digit is varus right and left partially reducible. No ecchymosis, erythema, edema, or cardinal signs infection.    Fat pad atrophy to heels and met heads bilateral     Lymphadenopathy:   No lymphatic streaking    Negative lymphadenopathy bilateral popliteal fossa and tarsal tunnel.     Neurological:   [place]am[place] 5.07 monofilamant testing is diminished Ryan feet. Decreased/absent vibratory sensation bilateral feet to 128Hz tuning fork.     Paresthesias, and hyperesthesia bilateral feet with no clearly identified trigger or source.    Skin: Skin is warm and dry. No abrasion, no burn, no ecchymosis, no laceration, no petechiae and no rash noted. He is not diaphoretic. No cyanosis. No pallor. Nails show no clubbing.   Skin is thin, atrophic, xerotic    Toenails 1-5 bilaterally are elongated by 2-3 mm, thickened by 2-3 mm, discolored/yellowed, dystrophic, brittle with  subungual debris.    Focal hyperkeratotic lesion with painful central core consisting entirely of hyperkeratotic tissue without open skin, drainage, pus, fluctuance, malodor, or signs of infection:  sub 1st and 5th MTPJ luann     Psychiatric: His mood appears not anxious. His affect is not inappropriate. His speech is not slurred. He is not combative. He is communicative. He is attentive.   Nursing note reviewed.        Assessment:       Encounter Diagnoses   Name Primary?    Type II diabetes mellitus with neurological manifestations Yes    Corn or callus     Onychomycosis due to dermatophyte          Plan:       Avni was seen today for diabetes mellitus, nail care and foot problem.    Diagnoses and all orders for this visit:    Type II diabetes mellitus with neurological manifestations    Corn or callus    Onychomycosis due to dermatophyte      I counseled the patient on his conditions, their implications and medical management.    Shoe inspection. Diabetic Foot Education. Patient reminded of the importance of good nutrition and blood sugar control to help prevent podiatric complications of diabetes. Patient instructed on proper foot hygeine. We discussed wearing proper shoe gear, daily foot inspections, never walking without protective shoe gear, never putting sharp instruments to feet.      Significant difference in pain reaction on physical exam today than on previous encounter.    Encouraged patient to stretch aggressively. Continue to ice as needed    With patient's permission, nails were aggressively reduced and debrided x 10 to their soft tissue attachment mechanically and with electric , removing all offending nail and debris. Patient relates relief following the procedure.     After cleansing the  area w/ alcohol prep pad the above mentioned hyperkeratosis was trimmed utilizing No 15 scapel, to a smooth base with out incident. Patient tolerated this  well and reported comfort to the area of sub 1st  and 5th MTPJ bilaterally    He will continue to monitor the areas daily, inspect her feet, wear protective shoe gear when ambulatory, moisturizer to maintain skin integrity and follow in this office in approximately  2-3 months, sooner p.r.n.

## 2023-01-10 ENCOUNTER — OFFICE VISIT (OUTPATIENT)
Dept: PODIATRY | Facility: CLINIC | Age: 78
End: 2023-01-10
Payer: MEDICARE

## 2023-01-10 VITALS — HEIGHT: 71 IN | WEIGHT: 149 LBS | BODY MASS INDEX: 20.86 KG/M2

## 2023-01-10 DIAGNOSIS — M20.41 HAMMER TOES OF BOTH FEET: ICD-10-CM

## 2023-01-10 DIAGNOSIS — M20.42 HAMMER TOES OF BOTH FEET: ICD-10-CM

## 2023-01-10 DIAGNOSIS — L84 PRE-ULCERATIVE CALLUSES: ICD-10-CM

## 2023-01-10 DIAGNOSIS — M20.11 HALLUX ABDUCTO VALGUS, RIGHT: ICD-10-CM

## 2023-01-10 DIAGNOSIS — L90.9 PLANTAR FAT PAD ATROPHY: ICD-10-CM

## 2023-01-10 DIAGNOSIS — M20.12 HALLUX ABDUCTO VALGUS, LEFT: ICD-10-CM

## 2023-01-10 DIAGNOSIS — E11.49 TYPE II DIABETES MELLITUS WITH NEUROLOGICAL MANIFESTATIONS: Primary | ICD-10-CM

## 2023-01-10 PROCEDURE — 99999 PR PBB SHADOW E&M-EST. PATIENT-LVL IV: ICD-10-PCS | Mod: PBBFAC,,, | Performed by: PODIATRIST

## 2023-01-10 PROCEDURE — 99999 PR PBB SHADOW E&M-EST. PATIENT-LVL IV: CPT | Mod: PBBFAC,,, | Performed by: PODIATRIST

## 2023-01-10 PROCEDURE — 1159F PR MEDICATION LIST DOCUMENTED IN MEDICAL RECORD: ICD-10-PCS | Mod: CPTII,S$GLB,, | Performed by: PODIATRIST

## 2023-01-10 PROCEDURE — 1159F MED LIST DOCD IN RCRD: CPT | Mod: CPTII,S$GLB,, | Performed by: PODIATRIST

## 2023-01-10 PROCEDURE — 99213 PR OFFICE/OUTPT VISIT, EST, LEVL III, 20-29 MIN: ICD-10-PCS | Mod: S$GLB,,, | Performed by: PODIATRIST

## 2023-01-10 PROCEDURE — 3288F FALL RISK ASSESSMENT DOCD: CPT | Mod: CPTII,S$GLB,, | Performed by: PODIATRIST

## 2023-01-10 PROCEDURE — 1160F PR REVIEW ALL MEDS BY PRESCRIBER/CLIN PHARMACIST DOCUMENTED: ICD-10-PCS | Mod: CPTII,S$GLB,, | Performed by: PODIATRIST

## 2023-01-10 PROCEDURE — 99213 OFFICE O/P EST LOW 20 MIN: CPT | Mod: S$GLB,,, | Performed by: PODIATRIST

## 2023-01-10 PROCEDURE — 1160F RVW MEDS BY RX/DR IN RCRD: CPT | Mod: CPTII,S$GLB,, | Performed by: PODIATRIST

## 2023-01-10 PROCEDURE — 1101F PR PT FALLS ASSESS DOC 0-1 FALLS W/OUT INJ PAST YR: ICD-10-PCS | Mod: CPTII,S$GLB,, | Performed by: PODIATRIST

## 2023-01-10 PROCEDURE — 3288F PR FALLS RISK ASSESSMENT DOCUMENTED: ICD-10-PCS | Mod: CPTII,S$GLB,, | Performed by: PODIATRIST

## 2023-01-10 PROCEDURE — 1126F AMNT PAIN NOTED NONE PRSNT: CPT | Mod: CPTII,S$GLB,, | Performed by: PODIATRIST

## 2023-01-10 PROCEDURE — 1126F PR PAIN SEVERITY QUANTIFIED, NO PAIN PRESENT: ICD-10-PCS | Mod: CPTII,S$GLB,, | Performed by: PODIATRIST

## 2023-01-10 PROCEDURE — 1101F PT FALLS ASSESS-DOCD LE1/YR: CPT | Mod: CPTII,S$GLB,, | Performed by: PODIATRIST

## 2023-01-31 ENCOUNTER — OFFICE VISIT (OUTPATIENT)
Dept: PODIATRY | Facility: CLINIC | Age: 78
End: 2023-01-31
Payer: MEDICARE

## 2023-01-31 VITALS — HEIGHT: 71 IN | WEIGHT: 149 LBS | BODY MASS INDEX: 20.86 KG/M2

## 2023-01-31 DIAGNOSIS — M20.42 HAMMER TOES OF BOTH FEET: ICD-10-CM

## 2023-01-31 DIAGNOSIS — M20.41 HAMMER TOES OF BOTH FEET: ICD-10-CM

## 2023-01-31 DIAGNOSIS — L90.9 PLANTAR FAT PAD ATROPHY: ICD-10-CM

## 2023-01-31 DIAGNOSIS — L84 PRE-ULCERATIVE CALLUSES: ICD-10-CM

## 2023-01-31 DIAGNOSIS — M20.12 HALLUX ABDUCTO VALGUS, LEFT: ICD-10-CM

## 2023-01-31 DIAGNOSIS — E11.49 TYPE II DIABETES MELLITUS WITH NEUROLOGICAL MANIFESTATIONS: Primary | ICD-10-CM

## 2023-01-31 DIAGNOSIS — M20.11 HALLUX ABDUCTO VALGUS, RIGHT: ICD-10-CM

## 2023-01-31 DIAGNOSIS — L84 CORN OR CALLUS: ICD-10-CM

## 2023-01-31 DIAGNOSIS — B35.1 ONYCHOMYCOSIS DUE TO DERMATOPHYTE: ICD-10-CM

## 2023-01-31 PROCEDURE — 3288F FALL RISK ASSESSMENT DOCD: CPT | Mod: CPTII,S$GLB,, | Performed by: PODIATRIST

## 2023-01-31 PROCEDURE — 1160F RVW MEDS BY RX/DR IN RCRD: CPT | Mod: CPTII,S$GLB,, | Performed by: PODIATRIST

## 2023-01-31 PROCEDURE — 99499 UNLISTED E&M SERVICE: CPT | Mod: S$GLB,,, | Performed by: PODIATRIST

## 2023-01-31 PROCEDURE — 1101F PT FALLS ASSESS-DOCD LE1/YR: CPT | Mod: CPTII,S$GLB,, | Performed by: PODIATRIST

## 2023-01-31 PROCEDURE — 1126F AMNT PAIN NOTED NONE PRSNT: CPT | Mod: CPTII,S$GLB,, | Performed by: PODIATRIST

## 2023-01-31 PROCEDURE — 1159F MED LIST DOCD IN RCRD: CPT | Mod: CPTII,S$GLB,, | Performed by: PODIATRIST

## 2023-01-31 PROCEDURE — 1160F PR REVIEW ALL MEDS BY PRESCRIBER/CLIN PHARMACIST DOCUMENTED: ICD-10-PCS | Mod: CPTII,S$GLB,, | Performed by: PODIATRIST

## 2023-01-31 PROCEDURE — 11056 PARNG/CUTG B9 HYPRKR LES 2-4: CPT | Mod: Q9,S$GLB,, | Performed by: PODIATRIST

## 2023-01-31 PROCEDURE — 11056 PR TRIM BENIGN HYPERKERATOTIC SKIN LESION,2-4: ICD-10-PCS | Mod: Q9,S$GLB,, | Performed by: PODIATRIST

## 2023-01-31 PROCEDURE — 1126F PR PAIN SEVERITY QUANTIFIED, NO PAIN PRESENT: ICD-10-PCS | Mod: CPTII,S$GLB,, | Performed by: PODIATRIST

## 2023-01-31 PROCEDURE — 99999 PR PBB SHADOW E&M-EST. PATIENT-LVL III: CPT | Mod: PBBFAC,,, | Performed by: PODIATRIST

## 2023-01-31 PROCEDURE — 99499 NO LOS: ICD-10-PCS | Mod: S$GLB,,, | Performed by: PODIATRIST

## 2023-01-31 PROCEDURE — 1159F PR MEDICATION LIST DOCUMENTED IN MEDICAL RECORD: ICD-10-PCS | Mod: CPTII,S$GLB,, | Performed by: PODIATRIST

## 2023-01-31 PROCEDURE — 1101F PR PT FALLS ASSESS DOC 0-1 FALLS W/OUT INJ PAST YR: ICD-10-PCS | Mod: CPTII,S$GLB,, | Performed by: PODIATRIST

## 2023-01-31 PROCEDURE — 11721 DEBRIDE NAIL 6 OR MORE: CPT | Mod: 59,Q9,S$GLB, | Performed by: PODIATRIST

## 2023-01-31 PROCEDURE — 3288F PR FALLS RISK ASSESSMENT DOCUMENTED: ICD-10-PCS | Mod: CPTII,S$GLB,, | Performed by: PODIATRIST

## 2023-01-31 PROCEDURE — 99999 PR PBB SHADOW E&M-EST. PATIENT-LVL III: ICD-10-PCS | Mod: PBBFAC,,, | Performed by: PODIATRIST

## 2023-01-31 PROCEDURE — 11721 PR DEBRIDEMENT OF NAILS, 6 OR MORE: ICD-10-PCS | Mod: 59,Q9,S$GLB, | Performed by: PODIATRIST

## 2023-01-31 NOTE — PROGRESS NOTES
Subjective:      Patient ID: Avni Daniels is a 78 y.o. male.    Chief Complaint: Diabetes Mellitus, Wound Check, and Follow-up    Avni is a 78 y.o. male who presents to the clinic for evaluation and treatment of high risk feet. Avni has a past medical history of Diabetes mellitus, type 2 and Hypertension.  Complains of painful left foot callus and elongated, thickened toenails aggravated by increased weight bearing, shoe gear, pressure.   This patient has documented high risk feet requiring routine maintenance secondary to diabetes mellitis and those secondary complications of diabetes, as mentioned..    PCP: Reyes Gusman MD    Date Last Seen by PCP: Sue Ng NP 01/31/2023  Chief Complaint   Patient presents with    Diabetes Mellitus    Wound Check    Follow-up     Current shoe gear:  Casual shoes,    Hemoglobin A1c   Date Value Ref Range Status   07/07/2020 8.3 (H) <5.7 % of total Hgb Final     Comment:     For someone without known diabetes, a hemoglobin A1c  value of 6.5% or greater indicates that they may have   diabetes and this should be confirmed with a follow-up   test.    For someone with known diabetes, a value <7% indicates   that their diabetes is well controlled and a value   greater than or equal to 7% indicates suboptimal   control. A1c targets should be individualized based on   duration of diabetes, age, comorbid conditions, and   other considerations.    Currently, no consensus exists regarding use of  hemoglobin A1c for diagnosis of diabetes for children.       03/05/2020 7.9 (H) <5.7 % of total Hgb Final     Comment:     For someone without known diabetes, a hemoglobin A1c  value of 6.5% or greater indicates that they may have   diabetes and this should be confirmed with a follow-up   test.    For someone with known diabetes, a value <7% indicates   that their diabetes is well controlled and a value   greater than or equal to 7% indicates suboptimal   control. A1c targets should  "be individualized based on   duration of diabetes, age, comorbid conditions, and   other considerations.    Currently, no consensus exists regarding use of  hemoglobin A1c for diagnosis of diabetes for children.               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      atorvastatin (LIPITOR) 40 MG tablet       clopidogrel (PLAVIX) 75 mg tablet Take 75 mg by mouth once.       enalapril (VASOTEC) 5 MG tablet Take 5 mg by mouth once daily.       insulin NPH-insulin regular, 70/30, (NOVOLIN 70/30) 100 unit/mL (70-30) injection Inject 25 Units into the skin 2 (two) times daily.       insulin syringe-needle U-100 0.5 mL 31 gauge x 5/16 Syrg       insulin syringe-needle U-100 1/2 mL 30 gauge x 5/16 Syrg       meloxicam (MOBIC) 15 MG tablet Take 1 tablet (15 mg total) by mouth once daily. 1 pill per day everyday for the next 3 weeks with food 30 tablet 1    metoprolol succinate (TOPROL-XL) 25 MG 24 hr tablet Take 25 mg by mouth once daily.      metoprolol tartrate (LOPRESSOR) 25 MG tablet Take 25 mg by mouth Daily.      PACERONE 100 mg Tab Take 100 mg by mouth once daily.       THINPRO INSULIN SYRINGE 0.5 mL 31 x 3/8" Syrg       VITAMIN D2 1,250 mcg (50,000 unit) capsule TAKE 1 CAPSULE BY MOUTH ONCE EVERY MONTH       No current facility-administered medications on file prior to visit.       Review of patient's allergies indicates:  No Known Allergies    Past Surgical History:   Procedure Laterality Date    APPENDECTOMY      GALLBLADDER SURGERY      TUMOR REMOVAL Left     foot       History reviewed. No pertinent family history.    Social History     Socioeconomic History    Marital status:    Tobacco Use    Smoking " "status: Former    Smokeless tobacco: Former         Review of Systems   Constitution: Negative for chills and fever.   Cardiovascular: Negative for claudication and leg swelling.   Respiratory: Negative for cough and shortness of breath.    Skin: Positive for dry skin, nail changes and poor wound healing (hx of left foot ulcer). Negative for itching and rash.   Musculoskeletal: Positive for joint pain. Negative for falls, joint swelling and muscle weakness.   Gastrointestinal: Negative for diarrhea, nausea and vomiting.   Neurological: Positive for numbness and paresthesias. Negative for tremors and weakness.   Psychiatric/Behavioral: Negative for altered mental status and hallucinations.           Objective:       Vitals:    01/31/23 1121   Weight: 67.6 kg (149 lb)   Height: 5' 11" (1.803 m)   PainSc: 0-No pain       Physical Exam   Constitutional:  Non-toxic appearance. He does not have a sickly appearance. No distress.   Pt. is well-developed, well-nourished, appears stated age, in no acute distress, alert and oriented x 3. No evidence of depression, anxiety, or agitation. Calm, cooperative, and communicative. Appropriate interactions and affect.   Cardiovascular:   Pulses:       Dorsalis pedis pulses are 1+ on the right side, and 1+ on the left side.        Posterior tibial pulses are 1+ on the right side, and 1+ on the left side.   Dorsalis pedis and posterior tibial pulses are diminished Bilaterally. Toes are cool to touch. Feet are warm proximally.There is decreased digital hair. Skin is atrophic   Pulmonary/Chest: No respiratory distress.   Musculoskeletal:        Right ankle: No tenderness. No lateral malleolus, no medial malleolus, no AITFL, no CF ligament and no posterior TFL tenderness found. Achilles tendon exhibits no pain, no defect and normal Elias's test results.        Left ankle: No tenderness. No lateral malleolus, no medial malleolus, no AITFL, no CF ligament and no posterior TFL tenderness " found. Achilles tendon exhibits no pain, no defect and normal Elias's test results.        Right foot: There is no tenderness and no bony tenderness.        Left foot: There is no tenderness and no bony tenderness.     Decreased stride, station of gait.  apropulsive toe off.  Increased angle and base of gait.    Patient has hammertoes of digits 2-5 bilateral partially reducible without symptom today.    Visible and palpable bunion without pain at dorsomedial 1st metatarsal head right and left.  Hallux abducted right and left partially reducible, tracks laterally without being track bound.  No ecchymosis, erythema, edema, or cardinal signs infection or signs of trauma same foot.    Visible and palpable tailors bunion at dorsolateral 5th metatarsal head right and left.  5th digit is varus right and left partially reducible. No ecchymosis, erythema, edema, or cardinal signs infection.    Fat pad atrophy to heels and met heads bilateral     Lymphadenopathy:   No lymphatic streaking    Negative lymphadenopathy bilateral popliteal fossa and tarsal tunnel.     Neurological:   [place]amanna 5.07 monofilamant testing is diminished Ryan feet. Decreased/absent vibratory sensation bilateral feet to 128Hz tuning fork.     Paresthesias, and hyperesthesia bilateral feet with no clearly identified trigger or source.    Skin: Skin is warm and dry. No abrasion, no burn, no ecchymosis, no laceration, no petechiae and no rash noted. He is not diaphoretic. No cyanosis. No pallor. Nails show no clubbing.   Skin is thin, atrophic, xerotic    Toenails 1-5 bilaterally are elongated by 2-3 mm, thickened by 2-3 mm, discolored/yellowed, dystrophic, brittle with subungual debris.    Focal hyperkeratotic lesion with painful central core consisting entirely of hyperkeratotic tissue without open skin, drainage, pus, fluctuance, malodor, or signs of infection:  sub 1st and 5th MTPJ ryan     Psychiatric: His mood appears not anxious. His affect  is not inappropriate. His speech is not slurred. He is not combative. He is communicative. He is attentive.   Nursing note reviewed.        Assessment:       Encounter Diagnoses   Name Primary?    Type II diabetes mellitus with neurological manifestations Yes    Pre-ulcerative calluses     Corn or callus     Onychomycosis due to dermatophyte     Plantar fat pad atrophy     Hammer toes of both feet     Hallux abducto valgus, left     Hallux abducto valgus, right            Plan:       Avni was seen today for diabetes mellitus, wound check and follow-up.    Diagnoses and all orders for this visit:    Type II diabetes mellitus with neurological manifestations    Pre-ulcerative calluses    Corn or callus    Onychomycosis due to dermatophyte    Plantar fat pad atrophy    Hammer toes of both feet    Hallux abducto valgus, left    Hallux abducto valgus, right        I counseled the patient on his conditions, their implications and medical management.    Shoe inspection. Diabetic Foot Education. Patient reminded of the importance of good nutrition and blood sugar control to help prevent podiatric complications of diabetes. Patient instructed on proper foot hygeine. We discussed wearing proper shoe gear, daily foot inspections, never walking without protective shoe gear, never putting sharp instruments to feet.      Encouraged patient to stretch aggressively. Continue to ice as needed    With patient's permission, nails were aggressively reduced and debrided x 10 to their soft tissue attachment mechanically and with electric , removing all offending nail and debris. Patient relates relief following the procedure.     After cleansing the  area w/ alcohol prep pad the above mentioned hyperkeratosis was trimmed utilizing No 15 scapel, to a smooth base with out incident. Patient tolerated this  well and reported comfort to the area of sub 1st and 5th MTPJ bilaterally    He will continue to monitor the areas daily, inspect  her feet, wear protective shoe gear when ambulatory, moisturizer to maintain skin integrity and follow in this office in approximately  2-3 months, sooner p.r.n.

## 2023-02-01 NOTE — PROGRESS NOTES
Subjective:      Patient ID: Avni Daniels is a 78 y.o. male.    Chief Complaint: Diabetes Mellitus, Follow-up, and Wound Check    Avni is a 78 y.o. male who presents to the clinic for evaluation and treatment of high risk feet. Avni has a past medical history of Diabetes mellitus, type 2 and Hypertension.  Complains of painful left foot callus with discoloration.  Has not seen active drainage but pain has increased.    This patient has documented high risk feet requiring routine maintenance secondary to diabetes mellitis and those secondary complications of diabetes, as mentioned..    PCP: Reyes Gusman MD    Date Last Seen by PCP:   Chief Complaint   Patient presents with    Diabetes Mellitus    Follow-up    Wound Check     Current shoe gear:  Casual     Hemoglobin A1c   Date Value Ref Range Status   07/07/2020 8.3 (H) <5.7 % of total Hgb Final     Comment:     For someone without known diabetes, a hemoglobin A1c  value of 6.5% or greater indicates that they may have   diabetes and this should be confirmed with a follow-up   test.    For someone with known diabetes, a value <7% indicates   that their diabetes is well controlled and a value   greater than or equal to 7% indicates suboptimal   control. A1c targets should be individualized based on   duration of diabetes, age, comorbid conditions, and   other considerations.    Currently, no consensus exists regarding use of  hemoglobin A1c for diagnosis of diabetes for children.       03/05/2020 7.9 (H) <5.7 % of total Hgb Final     Comment:     For someone without known diabetes, a hemoglobin A1c  value of 6.5% or greater indicates that they may have   diabetes and this should be confirmed with a follow-up   test.    For someone with known diabetes, a value <7% indicates   that their diabetes is well controlled and a value   greater than or equal to 7% indicates suboptimal   control. A1c targets should be individualized based on   duration of diabetes, age,  "comorbid conditions, and   other considerations.    Currently, no consensus exists regarding use of  hemoglobin A1c for diagnosis of diabetes for children.               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      atorvastatin (LIPITOR) 40 MG tablet       clopidogrel (PLAVIX) 75 mg tablet Take 75 mg by mouth once.       enalapril (VASOTEC) 5 MG tablet Take 5 mg by mouth once daily.       insulin NPH-insulin regular, 70/30, (NOVOLIN 70/30) 100 unit/mL (70-30) injection Inject 25 Units into the skin 2 (two) times daily.       insulin syringe-needle U-100 0.5 mL 31 gauge x 5/16 Syrg       insulin syringe-needle U-100 1/2 mL 30 gauge x 5/16 Syrg       meloxicam (MOBIC) 15 MG tablet Take 1 tablet (15 mg total) by mouth once daily. 1 pill per day everyday for the next 3 weeks with food 30 tablet 1    metoprolol succinate (TOPROL-XL) 25 MG 24 hr tablet Take 25 mg by mouth once daily.      metoprolol tartrate (LOPRESSOR) 25 MG tablet Take 25 mg by mouth Daily.      PACERONE 100 mg Tab Take 100 mg by mouth once daily.       THINPRO INSULIN SYRINGE 0.5 mL 31 x 3/8" Syrg       VITAMIN D2 1,250 mcg (50,000 unit) capsule TAKE 1 CAPSULE BY MOUTH ONCE EVERY MONTH       No current facility-administered medications on file prior to visit.       Review of patient's allergies indicates:  No Known Allergies    Past Surgical History:   Procedure Laterality Date    APPENDECTOMY      GALLBLADDER SURGERY      TUMOR REMOVAL Left     foot       History reviewed. No pertinent family history.    Social History     Socioeconomic History    Marital status:    Tobacco Use    Smoking status: Former    Smokeless tobacco: Former         Review of " "Systems   Constitution: Negative for chills and fever.   Cardiovascular: Negative for claudication and leg swelling.   Respiratory: Negative for cough and shortness of breath.    Skin: Positive for dry skin, nail changes and poor wound healing (hx of left foot ulcer). Negative for itching and rash.   Musculoskeletal: Positive for joint pain. Negative for falls, joint swelling and muscle weakness.   Gastrointestinal: Negative for diarrhea, nausea and vomiting.   Neurological: Positive for numbness and paresthesias. Negative for tremors and weakness.   Psychiatric/Behavioral: Negative for altered mental status and hallucinations.           Objective:       Vitals:    01/10/23 1425   Weight: 67.6 kg (149 lb)   Height: 5' 11" (1.803 m)   PainSc: 0-No pain       Physical Exam   Constitutional:  Non-toxic appearance. He does not have a sickly appearance. No distress.   Pt. is well-developed, well-nourished, appears stated age, in no acute distress, alert and oriented x 3. No evidence of depression, anxiety, or agitation. Calm, cooperative, and communicative. Appropriate interactions and affect.   Cardiovascular:   Pulses:       Dorsalis pedis pulses are 1+ on the right side, and 1+ on the left side.        Posterior tibial pulses are 1+ on the right side, and 1+ on the left side.   Dorsalis pedis and posterior tibial pulses are diminished Bilaterally. Toes are cool to touch. Feet are warm proximally.There is decreased digital hair. Skin is atrophic   Pulmonary/Chest: No respiratory distress.   Musculoskeletal:        Right ankle: No tenderness. No lateral malleolus, no medial malleolus, no AITFL, no CF ligament and no posterior TFL tenderness found. Achilles tendon exhibits no pain, no defect and normal Elias's test results.        Left ankle: No tenderness. No lateral malleolus, no medial malleolus, no AITFL, no CF ligament and no posterior TFL tenderness found. Achilles tendon exhibits no pain, no defect and normal " Elias's test results.       Decreased stride, station of gait.  apropulsive toe off.  Increased angle and base of gait.    Patient has hammertoes of digits 2-5 bilateral partially reducible without symptom today.    Visible and palpable bunion without pain at dorsomedial 1st metatarsal head right and left.  Hallux abducted right and left partially reducible, tracks laterally without being track bound.  No ecchymosis, erythema, edema, or cardinal signs infection or signs of trauma same foot.    Visible and palpable tailors bunion at dorsolateral 5th metatarsal head right and left.  5th digit is varus right and left partially reducible. No ecchymosis, erythema, edema, or cardinal signs infection.    Fat pad atrophy to heels and met heads bilateral     Lymphadenopathy:   No lymphatic streaking    Negative lymphadenopathy bilateral popliteal fossa and tarsal tunnel.     Neurological:   [place]amanna 5.07 monofilamant testing is diminished Ryan feet. Decreased/absent vibratory sensation bilateral feet to 128Hz tuning fork.     Paresthesias, and hyperesthesia bilateral feet with no clearly identified trigger or source.    Skin: Skin is warm and dry. No abrasion, no burn, no ecchymosis, no laceration, no petechiae and no rash noted. He is not diaphoretic. No cyanosis. No pallor. Nails show no clubbing.   Skin is thin, atrophic, xerotic    Toenails 1-5 bilaterally are elongated by 2-3 mm, thickened by 2-3 mm, discolored/yellowed, dystrophic, brittle with subungual debris.    Focal hyperkeratotic lesion with painful central core consisting entirely of hyperkeratotic tissue without open skin, drainage, pus, fluctuance, malodor, or signs of infection:  sub 1st and 5th MTPJ ryan     Psychiatric: His mood appears not anxious. His affect is not inappropriate. His speech is not slurred. He is not combative. He is communicative. He is attentive.   Nursing note reviewed.      1/10/2023  Sub 1st MTPJ of the left foot               Assessment:       Encounter Diagnoses   Name Primary?    Type II diabetes mellitus with neurological manifestations Yes    Pre-ulcerative calluses     Plantar fat pad atrophy     Hammer toes of both feet     Hallux abducto valgus, left     Hallux abducto valgus, right            Plan:       Avni was seen today for diabetes mellitus, follow-up and wound check.    Diagnoses and all orders for this visit:    Type II diabetes mellitus with neurological manifestations    Pre-ulcerative calluses    Plantar fat pad atrophy    Hammer toes of both feet    Hallux abducto valgus, left    Hallux abducto valgus, right        Education about the prevention of limb loss.    Discussed wound healing cycle, skin integrity, ways to care for skin.Counseled patient on the effects of blood glucose on healing. He verbalizes understanding that it can increase the chances of delayed healing and this prolonged exposure leads to infection or progression of infection which subsequently can result in loss of limb.    Adequate vitamin supplementation, protein intake, and hydration - discussed with patient    The site is cleansed of foreign material as much as possible and the base inspected for bone or abscess. Thin epithelium and abrasion with periwound maceration without bone nor joint exposure    Detailed home care instructions given    Follow-up: 2-3 weeks but should call [facility] immediately if any signs of infection, such as fever, chills, sweats, increased redness or pain    Short-term goals include maintaining good offloading and minimizing bioburden, promoting granulation and epithelialization to healing.  Long-term goals include keeping the wound healed by good offloading and medical management under the direction of internist.    shoe inspection. Diabetic Foot Education. Patient reminded of the importance of good nutrition and blood sugar control to help prevent podiatric complications of diabetes. Patient instructed on proper  foot hygeine. We discussed wearing proper shoe gear, daily foot inspections, never walking without protective shoe gear, never putting sharp instruments to feet.

## 2023-02-28 ENCOUNTER — OFFICE VISIT (OUTPATIENT)
Dept: PODIATRY | Facility: CLINIC | Age: 78
End: 2023-02-28
Payer: MEDICARE

## 2023-02-28 VITALS — BODY MASS INDEX: 20.87 KG/M2 | WEIGHT: 149.06 LBS | HEIGHT: 71 IN

## 2023-02-28 DIAGNOSIS — E10.621 TYPE 1 DIABETES MELLITUS WITH LEFT DIABETIC FOOT ULCER: Primary | ICD-10-CM

## 2023-02-28 DIAGNOSIS — L97.529 TYPE 1 DIABETES MELLITUS WITH LEFT DIABETIC FOOT ULCER: Primary | ICD-10-CM

## 2023-02-28 PROCEDURE — 3288F PR FALLS RISK ASSESSMENT DOCUMENTED: ICD-10-PCS | Mod: CPTII,S$GLB,, | Performed by: PODIATRIST

## 2023-02-28 PROCEDURE — 99999 PR PBB SHADOW E&M-EST. PATIENT-LVL III: ICD-10-PCS | Mod: PBBFAC,,, | Performed by: PODIATRIST

## 2023-02-28 PROCEDURE — 87077 CULTURE AEROBIC IDENTIFY: CPT | Performed by: PODIATRIST

## 2023-02-28 PROCEDURE — 99999 PR PBB SHADOW E&M-EST. PATIENT-LVL III: CPT | Mod: PBBFAC,,, | Performed by: PODIATRIST

## 2023-02-28 PROCEDURE — 99214 PR OFFICE/OUTPT VISIT, EST, LEVL IV, 30-39 MIN: ICD-10-PCS | Mod: S$GLB,,, | Performed by: PODIATRIST

## 2023-02-28 PROCEDURE — 1101F PR PT FALLS ASSESS DOC 0-1 FALLS W/OUT INJ PAST YR: ICD-10-PCS | Mod: CPTII,S$GLB,, | Performed by: PODIATRIST

## 2023-02-28 PROCEDURE — 87070 CULTURE OTHR SPECIMN AEROBIC: CPT | Performed by: PODIATRIST

## 2023-02-28 PROCEDURE — 87075 CULTR BACTERIA EXCEPT BLOOD: CPT | Performed by: PODIATRIST

## 2023-02-28 PROCEDURE — 99214 OFFICE O/P EST MOD 30 MIN: CPT | Mod: S$GLB,,, | Performed by: PODIATRIST

## 2023-02-28 PROCEDURE — 87186 SC STD MICRODIL/AGAR DIL: CPT | Performed by: PODIATRIST

## 2023-02-28 PROCEDURE — 1126F PR PAIN SEVERITY QUANTIFIED, NO PAIN PRESENT: ICD-10-PCS | Mod: CPTII,S$GLB,, | Performed by: PODIATRIST

## 2023-02-28 PROCEDURE — 87076 CULTURE ANAEROBE IDENT EACH: CPT | Performed by: PODIATRIST

## 2023-02-28 PROCEDURE — 3288F FALL RISK ASSESSMENT DOCD: CPT | Mod: CPTII,S$GLB,, | Performed by: PODIATRIST

## 2023-02-28 PROCEDURE — 1159F PR MEDICATION LIST DOCUMENTED IN MEDICAL RECORD: ICD-10-PCS | Mod: CPTII,S$GLB,, | Performed by: PODIATRIST

## 2023-02-28 PROCEDURE — 1126F AMNT PAIN NOTED NONE PRSNT: CPT | Mod: CPTII,S$GLB,, | Performed by: PODIATRIST

## 2023-02-28 PROCEDURE — 1101F PT FALLS ASSESS-DOCD LE1/YR: CPT | Mod: CPTII,S$GLB,, | Performed by: PODIATRIST

## 2023-02-28 PROCEDURE — 1159F MED LIST DOCD IN RCRD: CPT | Mod: CPTII,S$GLB,, | Performed by: PODIATRIST

## 2023-02-28 RX ORDER — DOXYCYCLINE 100 MG/1
100 CAPSULE ORAL EVERY 12 HOURS
Qty: 20 CAPSULE | Refills: 0 | Status: SHIPPED | OUTPATIENT
Start: 2023-02-28 | End: 2023-12-05

## 2023-02-28 NOTE — PROGRESS NOTES
Subjective:      Patient ID: Avni Daniels is a 78 y.o. male.    Chief Complaint: Wound Check (Left foot) and Diabetes Mellitus    Avni is a 78 y.o. male who presents to the clinic for evaluation and treatment of high risk feet. Avni has a past medical history of Diabetes mellitus, type 2 and Hypertension.  Complains of painful left foot callus and elongated, thickened toenails aggravated by increased weight bearing, shoe gear, pressure.   This patient has documented high risk feet requiring routine maintenance secondary to diabetes mellitis and those secondary complications of diabetes, as mentioned..      2/28/23: Reports pain left foot ulceration, reports cleansing area with peroxide.     PCP: Reyes Gusman MD    Date Last Seen by PCP: Sue Ng NP 01/31/2023  Chief Complaint   Patient presents with    Wound Check     Left foot    Diabetes Mellitus     Current shoe gear:  Casual shoes,    Hemoglobin A1c   Date Value Ref Range Status   07/07/2020 8.3 (H) <5.7 % of total Hgb Final     Comment:     For someone without known diabetes, a hemoglobin A1c  value of 6.5% or greater indicates that they may have   diabetes and this should be confirmed with a follow-up   test.    For someone with known diabetes, a value <7% indicates   that their diabetes is well controlled and a value   greater than or equal to 7% indicates suboptimal   control. A1c targets should be individualized based on   duration of diabetes, age, comorbid conditions, and   other considerations.    Currently, no consensus exists regarding use of  hemoglobin A1c for diagnosis of diabetes for children.       03/05/2020 7.9 (H) <5.7 % of total Hgb Final     Comment:     For someone without known diabetes, a hemoglobin A1c  value of 6.5% or greater indicates that they may have   diabetes and this should be confirmed with a follow-up   test.    For someone with known diabetes, a value <7% indicates   that their diabetes is well controlled and a  "value   greater than or equal to 7% indicates suboptimal   control. A1c targets should be individualized based on   duration of diabetes, age, comorbid conditions, and   other considerations.    Currently, no consensus exists regarding use of  hemoglobin A1c for diagnosis of diabetes for children.               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      atorvastatin (LIPITOR) 40 MG tablet       clopidogrel (PLAVIX) 75 mg tablet Take 75 mg by mouth once.       enalapril (VASOTEC) 5 MG tablet Take 5 mg by mouth once daily.       insulin NPH-insulin regular, 70/30, (NOVOLIN 70/30) 100 unit/mL (70-30) injection Inject 25 Units into the skin 2 (two) times daily.       insulin syringe-needle U-100 0.5 mL 31 gauge x 5/16 Syrg       insulin syringe-needle U-100 1/2 mL 30 gauge x 5/16 Syrg       meloxicam (MOBIC) 15 MG tablet Take 1 tablet (15 mg total) by mouth once daily. 1 pill per day everyday for the next 3 weeks with food 30 tablet 1    metoprolol succinate (TOPROL-XL) 25 MG 24 hr tablet Take 25 mg by mouth once daily.      metoprolol tartrate (LOPRESSOR) 25 MG tablet Take 25 mg by mouth Daily.      PACERONE 100 mg Tab Take 100 mg by mouth once daily.       THINPRO INSULIN SYRINGE 0.5 mL 31 x 3/8" Syrg       VITAMIN D2 1,250 mcg (50,000 unit) capsule TAKE 1 CAPSULE BY MOUTH ONCE EVERY MONTH       No current facility-administered medications on file prior to visit.       Review of patient's allergies indicates:  No Known Allergies    Past Surgical History:   Procedure Laterality Date    APPENDECTOMY      GALLBLADDER SURGERY      TUMOR REMOVAL Left     foot       No family history on file.    Social History     Socioeconomic " "History    Marital status:    Tobacco Use    Smoking status: Former    Smokeless tobacco: Former         Review of Systems   Constitution: Negative for chills and fever.   Cardiovascular: Negative for claudication and leg swelling.   Respiratory: Negative for cough and shortness of breath.    Skin: Positive for dry skin, nail changes and poor wound healing (hx of left foot ulcer). Negative for itching and rash.   Musculoskeletal: Positive for joint pain. Negative for falls, joint swelling and muscle weakness.   Gastrointestinal: Negative for diarrhea, nausea and vomiting.   Neurological: Positive for numbness and paresthesias. Negative for tremors and weakness.   Psychiatric/Behavioral: Negative for altered mental status and hallucinations.           Objective:       Vitals:    02/28/23 1410   Weight: 67.6 kg (149 lb 0.5 oz)   Height: 5' 11" (1.803 m)   PainSc: 0-No pain       Physical Exam   Constitutional:  Non-toxic appearance. He does not have a sickly appearance. No distress.   Pt. is well-developed, well-nourished, appears stated age, in no acute distress, alert and oriented x 3. No evidence of depression, anxiety, or agitation. Calm, cooperative, and communicative. Appropriate interactions and affect.   Cardiovascular:   Pulses:       Dorsalis pedis pulses are 1+ on the right side, and 1+ on the left side.        Posterior tibial pulses are 1+ on the right side, and 1+ on the left side.   Dorsalis pedis and posterior tibial pulses are diminished Bilaterally. Toes are cool to touch. Feet are warm proximally.There is decreased digital hair. Skin is atrophic   Pulmonary/Chest: No respiratory distress.   Musculoskeletal:        Right ankle: No tenderness. No lateral malleolus, no medial malleolus, no AITFL, no CF ligament and no posterior TFL tenderness found. Achilles tendon exhibits no pain, no defect and normal Elias's test results.        Left ankle: No tenderness. No lateral malleolus, no medial " malleolus, no AITFL, no CF ligament and no posterior TFL tenderness found. Achilles tendon exhibits no pain, no defect and normal Elias's test results.        Right foot: There is no tenderness and no bony tenderness.        Left foot: There is no tenderness and no bony tenderness.     Decreased stride, station of gait.  apropulsive toe off.  Increased angle and base of gait.    Patient has hammertoes of digits 2-5 bilateral partially reducible without symptom today.    Visible and palpable bunion without pain at dorsomedial 1st metatarsal head right and left.  Hallux abducted right and left partially reducible, tracks laterally without being track bound.  No ecchymosis, erythema, edema, or cardinal signs infection or signs of trauma same foot.    Visible and palpable tailors bunion at dorsolateral 5th metatarsal head right and left.  5th digit is varus right and left partially reducible. No ecchymosis, erythema, edema, or cardinal signs infection.    Fat pad atrophy to heels and met heads bilateral     Lymphadenopathy:   No lymphatic streaking    Negative lymphadenopathy bilateral popliteal fossa and tarsal tunnel.     Neurological:   [place]am[place] 5.07 monofilamant testing is diminished Ryan feet. Decreased/absent vibratory sensation bilateral feet to 128Hz tuning fork.     Paresthesias, and hyperesthesia bilateral feet with no clearly identified trigger or source.    Skin: Skin is warm and dry. No abrasion, no burn, no ecchymosis, no laceration, no petechiae and no rash noted. He is not diaphoretic. No cyanosis. No pallor. Nails show no clubbing.   Skin is thin, atrophic, xerotic    Toenails 1-5 bilaterally are elongated by 2-3 mm, thickened by 2-3 mm, discolored/yellowed, dystrophic, brittle with subungual debris.    Focal hyperkeratotic lesion with painful central core consisting entirely of hyperkeratotic tissue without open skin, drainage, pus, fluctuance, malodor, or signs of infection:  sub 1st and 5th  MTPJ luann     Psychiatric: His mood appears not anxious. His affect is not inappropriate. His speech is not slurred. He is not combative. He is communicative. He is attentive.   Nursing note reviewed.    2/28/23: Post debridement            Assessment:       Encounter Diagnosis   Name Primary?    Type 1 diabetes mellitus with left diabetic foot ulcer Yes           Plan:       Avni was seen today for wound check and diabetes mellitus.    Diagnoses and all orders for this visit:    Type 1 diabetes mellitus with left diabetic foot ulcer  -     Aerobic culture (Specify Source)  -     CULTURE, ANAEROBE  -     Ankle Brachial Indices (SVITLANA); Future    Other orders  -     doxycycline (VIBRAMYCIN) 100 MG Cap; Take 1 capsule (100 mg total) by mouth every 12 (twelve) hours.        I counseled the patient on his conditions, their implications and medical management.    Shoe inspection. Diabetic Foot Education. Patient reminded of the importance of good nutrition and blood sugar control to help prevent podiatric complications of diabetes. Patient instructed on proper foot hygeine. We discussed wearing proper shoe gear, daily foot inspections, never walking without protective shoe gear, never putting sharp instruments to feet.      Culture obtained.     SVITLANA ordered.     Rx. Doxycycline.     Debridement: With verbal consent, nonviable tissues on the left foot were debrided to subq utilizing a  sterile No. 3 scalpel and forceps. Minimal bleeding controlled with direct pressure  The patient tolerated this well.     Dressings: endoform, iodosorb  Offloading:Foam    Follow-up:Patient is to return to the clinic in 1 week  for follow-up but should call [facility] immediately if any signs of infection, such as fever, chills, sweats, increased redness or pain.    Short-term goals include maintaining good offloading and minimizing bioburden, promoting granulation and epithelialization to healing.  Long-term goals include keeping the wound  healed by good offloading and medical management under the direction of internist.

## 2023-03-03 LAB — BACTERIA SPEC AEROBE CULT: ABNORMAL

## 2023-03-07 LAB — BACTERIA SPEC ANAEROBE CULT: ABNORMAL

## 2023-03-08 ENCOUNTER — OFFICE VISIT (OUTPATIENT)
Dept: PODIATRY | Facility: CLINIC | Age: 78
End: 2023-03-08
Payer: MEDICARE

## 2023-03-08 VITALS — BODY MASS INDEX: 20.86 KG/M2 | WEIGHT: 149 LBS | HEIGHT: 71 IN

## 2023-03-08 DIAGNOSIS — E11.49 TYPE II DIABETES MELLITUS WITH NEUROLOGICAL MANIFESTATIONS: ICD-10-CM

## 2023-03-08 DIAGNOSIS — L97.529 TYPE 2 DIABETES MELLITUS WITH LEFT DIABETIC FOOT ULCER: Primary | ICD-10-CM

## 2023-03-08 DIAGNOSIS — E11.621 TYPE 2 DIABETES MELLITUS WITH LEFT DIABETIC FOOT ULCER: Primary | ICD-10-CM

## 2023-03-08 PROCEDURE — 1101F PT FALLS ASSESS-DOCD LE1/YR: CPT | Mod: CPTII,S$GLB,, | Performed by: PODIATRIST

## 2023-03-08 PROCEDURE — 11042 DBRDMT SUBQ TIS 1ST 20SQCM/<: CPT | Mod: S$GLB,,, | Performed by: PODIATRIST

## 2023-03-08 PROCEDURE — 87075 CULTR BACTERIA EXCEPT BLOOD: CPT | Performed by: PODIATRIST

## 2023-03-08 PROCEDURE — 87076 CULTURE ANAEROBE IDENT EACH: CPT | Performed by: PODIATRIST

## 2023-03-08 PROCEDURE — 11042 PR DEBRIDEMENT, SKIN, SUB-Q TISSUE,=<20 SQ CM: ICD-10-PCS | Mod: S$GLB,,, | Performed by: PODIATRIST

## 2023-03-08 PROCEDURE — 1160F PR REVIEW ALL MEDS BY PRESCRIBER/CLIN PHARMACIST DOCUMENTED: ICD-10-PCS | Mod: CPTII,S$GLB,, | Performed by: PODIATRIST

## 2023-03-08 PROCEDURE — 1126F AMNT PAIN NOTED NONE PRSNT: CPT | Mod: CPTII,S$GLB,, | Performed by: PODIATRIST

## 2023-03-08 PROCEDURE — 87077 CULTURE AEROBIC IDENTIFY: CPT | Performed by: PODIATRIST

## 2023-03-08 PROCEDURE — 87186 SC STD MICRODIL/AGAR DIL: CPT | Performed by: PODIATRIST

## 2023-03-08 PROCEDURE — 3288F FALL RISK ASSESSMENT DOCD: CPT | Mod: CPTII,S$GLB,, | Performed by: PODIATRIST

## 2023-03-08 PROCEDURE — 99499 UNLISTED E&M SERVICE: CPT | Mod: S$GLB,,, | Performed by: PODIATRIST

## 2023-03-08 PROCEDURE — 1160F RVW MEDS BY RX/DR IN RCRD: CPT | Mod: CPTII,S$GLB,, | Performed by: PODIATRIST

## 2023-03-08 PROCEDURE — 1101F PR PT FALLS ASSESS DOC 0-1 FALLS W/OUT INJ PAST YR: ICD-10-PCS | Mod: CPTII,S$GLB,, | Performed by: PODIATRIST

## 2023-03-08 PROCEDURE — 99999 PR PBB SHADOW E&M-EST. PATIENT-LVL III: CPT | Mod: PBBFAC,,, | Performed by: PODIATRIST

## 2023-03-08 PROCEDURE — 1159F MED LIST DOCD IN RCRD: CPT | Mod: CPTII,S$GLB,, | Performed by: PODIATRIST

## 2023-03-08 PROCEDURE — 99499 NO LOS: ICD-10-PCS | Mod: S$GLB,,, | Performed by: PODIATRIST

## 2023-03-08 PROCEDURE — 3288F PR FALLS RISK ASSESSMENT DOCUMENTED: ICD-10-PCS | Mod: CPTII,S$GLB,, | Performed by: PODIATRIST

## 2023-03-08 PROCEDURE — 1159F PR MEDICATION LIST DOCUMENTED IN MEDICAL RECORD: ICD-10-PCS | Mod: CPTII,S$GLB,, | Performed by: PODIATRIST

## 2023-03-08 PROCEDURE — 99999 PR PBB SHADOW E&M-EST. PATIENT-LVL III: ICD-10-PCS | Mod: PBBFAC,,, | Performed by: PODIATRIST

## 2023-03-08 PROCEDURE — 87070 CULTURE OTHR SPECIMN AEROBIC: CPT | Performed by: PODIATRIST

## 2023-03-08 PROCEDURE — 1126F PR PAIN SEVERITY QUANTIFIED, NO PAIN PRESENT: ICD-10-PCS | Mod: CPTII,S$GLB,, | Performed by: PODIATRIST

## 2023-03-08 NOTE — PROGRESS NOTES
Subjective:      Patient ID: Avni Daniels is a 78 y.o. male.    Chief Complaint: Diabetes Mellitus, Wound Check, and Follow-up    Avni is a 78 y.o. male who presents to the clinic for evaluation and treatment of high risk feet. Avni has a past medical history of Diabetes mellitus, type 2 and Hypertension.  Complains of painful left foot callus and elongated, thickened toenails aggravated by increased weight bearing, shoe gear, pressure.   This patient has documented high risk feet requiring routine maintenance secondary to diabetes mellitis and those secondary complications of diabetes, as mentioned..      2/28/23: Reports pain left foot ulceration, reports cleansing area with peroxide.     3/8/2023 patient discontinues used of peroxide and has attempted to care for foot as instructed    PCP: Reyes Gusman MD    Date Last Seen by PCP: Sue Ng NP 01/31/2023  Chief Complaint   Patient presents with    Diabetes Mellitus    Wound Check    Follow-up       Hemoglobin A1c   Date Value Ref Range Status   07/07/2020 8.3 (H) <5.7 % of total Hgb Final     Comment:     For someone without known diabetes, a hemoglobin A1c  value of 6.5% or greater indicates that they may have   diabetes and this should be confirmed with a follow-up   test.    For someone with known diabetes, a value <7% indicates   that their diabetes is well controlled and a value   greater than or equal to 7% indicates suboptimal   control. A1c targets should be individualized based on   duration of diabetes, age, comorbid conditions, and   other considerations.    Currently, no consensus exists regarding use of  hemoglobin A1c for diagnosis of diabetes for children.       03/05/2020 7.9 (H) <5.7 % of total Hgb Final     Comment:     For someone without known diabetes, a hemoglobin A1c  value of 6.5% or greater indicates that they may have   diabetes and this should be confirmed with a follow-up   test.    For someone with known diabetes, a  "value <7% indicates   that their diabetes is well controlled and a value   greater than or equal to 7% indicates suboptimal   control. A1c targets should be individualized based on   duration of diabetes, age, comorbid conditions, and   other considerations.    Currently, no consensus exists regarding use of  hemoglobin A1c for diagnosis of diabetes for children.               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      atorvastatin (LIPITOR) 40 MG tablet       clopidogrel (PLAVIX) 75 mg tablet Take 75 mg by mouth once.       doxycycline (VIBRAMYCIN) 100 MG Cap Take 1 capsule (100 mg total) by mouth every 12 (twelve) hours. 20 capsule 0    enalapril (VASOTEC) 5 MG tablet Take 5 mg by mouth once daily.       insulin NPH-insulin regular, 70/30, (NOVOLIN 70/30) 100 unit/mL (70-30) injection Inject 25 Units into the skin 2 (two) times daily.       insulin syringe-needle U-100 0.5 mL 31 gauge x 5/16 Syrg       insulin syringe-needle U-100 1/2 mL 30 gauge x 5/16 Syrg       meloxicam (MOBIC) 15 MG tablet Take 1 tablet (15 mg total) by mouth once daily. 1 pill per day everyday for the next 3 weeks with food 30 tablet 1    metoprolol succinate (TOPROL-XL) 25 MG 24 hr tablet Take 25 mg by mouth once daily.      metoprolol tartrate (LOPRESSOR) 25 MG tablet Take 25 mg by mouth Daily.      PACERONE 100 mg Tab Take 100 mg by mouth once daily.       THINPRO INSULIN SYRINGE 0.5 mL 31 x 3/8" Syrg       VITAMIN D2 1,250 mcg (50,000 unit) capsule TAKE 1 CAPSULE BY MOUTH ONCE EVERY MONTH       No current facility-administered medications on file prior to visit.       Review of patient's allergies indicates:  No Known Allergies    Past " "Surgical History:   Procedure Laterality Date    APPENDECTOMY      GALLBLADDER SURGERY      TUMOR REMOVAL Left     foot       History reviewed. No pertinent family history.    Social History     Socioeconomic History    Marital status:    Tobacco Use    Smoking status: Former    Smokeless tobacco: Former         Review of Systems   Constitution: Negative for chills and fever.   Cardiovascular: Negative for claudication and leg swelling.   Respiratory: Negative for cough and shortness of breath.    Skin: Positive for dry skin, nail changes and poor wound healing (hx of left foot ulcer). Negative for itching and rash.   Musculoskeletal: Positive for joint pain. Negative for falls, joint swelling and muscle weakness.   Gastrointestinal: Negative for diarrhea, nausea and vomiting.   Neurological: Positive for numbness and paresthesias. Negative for tremors and weakness.   Psychiatric/Behavioral: Negative for altered mental status and hallucinations.           Objective:       Vitals:    03/08/23 1048   Weight: 67.6 kg (149 lb)   Height: 5' 11" (1.803 m)   PainSc: 0-No pain       Physical Exam   Constitutional:  Non-toxic appearance. He does not have a sickly appearance. No distress.   Pt. is well-developed, well-nourished, appears stated age, in no acute distress, alert and oriented x 3. No evidence of depression, anxiety, or agitation. Calm, cooperative, and communicative. Appropriate interactions and affect.   Cardiovascular:   Pulses:       Dorsalis pedis pulses are 1+ on the right side, and 1+ on the left side.        Posterior tibial pulses are 1+ on the right side, and 1+ on the left side.   Dorsalis pedis and posterior tibial pulses are diminished Bilaterally. Toes are cool to touch. Feet are warm proximally.There is decreased digital hair. Skin is atrophic   Pulmonary/Chest: No respiratory distress.   Musculoskeletal:        Right ankle: No tenderness. No lateral malleolus, no medial malleolus, no AITFL, " no CF ligament and no posterior TFL tenderness found. Achilles tendon exhibits no pain, no defect and normal Elias's test results.        Left ankle: No tenderness. No lateral malleolus, no medial malleolus, no AITFL, no CF ligament and no posterior TFL tenderness found. Achilles tendon exhibits no pain, no defect and normal Elias's test results.        Right foot: There is no tenderness and no bony tenderness.        Left foot: There is no tenderness and no bony tenderness.     Decreased stride, station of gait.  apropulsive toe off.  Increased angle and base of gait.    Patient has hammertoes of digits 2-5 bilateral partially reducible without symptom today.    Visible and palpable bunion without pain at dorsomedial 1st metatarsal head right and left.  Hallux abducted right and left partially reducible, tracks laterally without being track bound.  No ecchymosis, erythema, edema, or cardinal signs infection or signs of trauma same foot.    Visible and palpable tailors bunion at dorsolateral 5th metatarsal head right and left.  5th digit is varus right and left partially reducible. No ecchymosis, erythema, edema, or cardinal signs infection.    Fat pad atrophy to heels and met heads bilateral     Lymphadenopathy:   No lymphatic streaking    Negative lymphadenopathy bilateral popliteal fossa and tarsal tunnel.     Neurological:   [place] 5.07 monofilamant testing is diminished Ryan feet. Decreased/absent vibratory sensation bilateral feet to 128Hz tuning fork.     Paresthesias, and hyperesthesia bilateral feet with no clearly identified trigger or source.    Skin: Skin is warm and dry. No abrasion, no burn, no ecchymosis, no laceration, no petechiae and no rash noted. He is not diaphoretic. No cyanosis. No pallor. Nails show no clubbing.   Skin is thin, atrophic, xerotic    Toenails 1-5 bilaterally are elongated by 2-3 mm, thickened by 2-3 mm, discolored/yellowed, dystrophic, brittle with subungual  debris.    Focal hyperkeratotic lesion with painful central core consisting entirely of hyperkeratotic tissue without open skin, drainage, pus, fluctuance, malodor, or signs of infection:  sub 1st and 5th MTPJ luann     Psychiatric: His mood appears not anxious. His affect is not inappropriate. His speech is not slurred. He is not combative. He is communicative. He is attentive.   Nursing note reviewed.    3/8/23    Photo did not load  Scant purulence and depth noted    2/28/23: Post debridement            Assessment:       Encounter Diagnoses   Name Primary?    Type 2 diabetes mellitus with left diabetic foot ulcer Yes    Type II diabetes mellitus with neurological manifestations            Plan:       Avni was seen today for diabetes mellitus, wound check and follow-up.    Diagnoses and all orders for this visit:    Type 2 diabetes mellitus with left diabetic foot ulcer  -     Aerobic culture  -     Culture, Anaerobic    Type II diabetes mellitus with neurological manifestations        I counseled the patient on his conditions, their implications and medical management.    Shoe inspection. Diabetic Foot Education. Patient reminded of the importance of good nutrition and blood sugar control to help prevent podiatric complications of diabetes. Patient instructed on proper foot hygeine. We discussed wearing proper shoe gear, daily foot inspections, never walking without protective shoe gear, never putting sharp instruments to feet.      Culture again obtained.     SVITLANA pending.     Debridement: With verbal consent, nonviable tissues on the left foot were debrided to subq utilizing a  sterile No. 3 scalpel and forceps. Minimal bleeding controlled with direct pressure  The patient tolerated this well.     Dressings: endoform, iodosorb  Offloading:Foam    Follow-up:Patient is to return to the clinic in 1 week  for follow-up but should call [facility] immediately if any signs of infection, such as fever, chills, sweats,  increased redness or pain.    Short-term goals include maintaining good offloading and minimizing bioburden, promoting granulation and epithelialization to healing.  Long-term goals include keeping the wound healed by good offloading and medical management under the direction of internist.

## 2023-03-11 LAB — BACTERIA SPEC AEROBE CULT: ABNORMAL

## 2023-03-13 LAB — BACTERIA SPEC ANAEROBE CULT: ABNORMAL

## 2023-03-16 ENCOUNTER — OFFICE VISIT (OUTPATIENT)
Dept: PODIATRY | Facility: CLINIC | Age: 78
End: 2023-03-16
Payer: MEDICARE

## 2023-03-16 VITALS — WEIGHT: 149 LBS | BODY MASS INDEX: 20.86 KG/M2 | HEIGHT: 71 IN

## 2023-03-16 DIAGNOSIS — L97.529 TYPE 2 DIABETES MELLITUS WITH LEFT DIABETIC FOOT ULCER: Primary | ICD-10-CM

## 2023-03-16 DIAGNOSIS — E11.49 TYPE II DIABETES MELLITUS WITH NEUROLOGICAL MANIFESTATIONS: ICD-10-CM

## 2023-03-16 DIAGNOSIS — E11.621 TYPE 2 DIABETES MELLITUS WITH LEFT DIABETIC FOOT ULCER: Primary | ICD-10-CM

## 2023-03-16 PROCEDURE — 3288F PR FALLS RISK ASSESSMENT DOCUMENTED: ICD-10-PCS | Mod: CPTII,S$GLB,, | Performed by: PODIATRIST

## 2023-03-16 PROCEDURE — 1101F PT FALLS ASSESS-DOCD LE1/YR: CPT | Mod: CPTII,S$GLB,, | Performed by: PODIATRIST

## 2023-03-16 PROCEDURE — 1101F PR PT FALLS ASSESS DOC 0-1 FALLS W/OUT INJ PAST YR: ICD-10-PCS | Mod: CPTII,S$GLB,, | Performed by: PODIATRIST

## 2023-03-16 PROCEDURE — 1126F PR PAIN SEVERITY QUANTIFIED, NO PAIN PRESENT: ICD-10-PCS | Mod: CPTII,S$GLB,, | Performed by: PODIATRIST

## 2023-03-16 PROCEDURE — 99214 OFFICE O/P EST MOD 30 MIN: CPT | Mod: S$GLB,,, | Performed by: PODIATRIST

## 2023-03-16 PROCEDURE — 1159F PR MEDICATION LIST DOCUMENTED IN MEDICAL RECORD: ICD-10-PCS | Mod: CPTII,S$GLB,, | Performed by: PODIATRIST

## 2023-03-16 PROCEDURE — 1160F RVW MEDS BY RX/DR IN RCRD: CPT | Mod: CPTII,S$GLB,, | Performed by: PODIATRIST

## 2023-03-16 PROCEDURE — 1160F PR REVIEW ALL MEDS BY PRESCRIBER/CLIN PHARMACIST DOCUMENTED: ICD-10-PCS | Mod: CPTII,S$GLB,, | Performed by: PODIATRIST

## 2023-03-16 PROCEDURE — 99214 PR OFFICE/OUTPT VISIT, EST, LEVL IV, 30-39 MIN: ICD-10-PCS | Mod: S$GLB,,, | Performed by: PODIATRIST

## 2023-03-16 PROCEDURE — 1159F MED LIST DOCD IN RCRD: CPT | Mod: CPTII,S$GLB,, | Performed by: PODIATRIST

## 2023-03-16 PROCEDURE — 99999 PR PBB SHADOW E&M-EST. PATIENT-LVL III: CPT | Mod: PBBFAC,,, | Performed by: PODIATRIST

## 2023-03-16 PROCEDURE — 1126F AMNT PAIN NOTED NONE PRSNT: CPT | Mod: CPTII,S$GLB,, | Performed by: PODIATRIST

## 2023-03-16 PROCEDURE — 99999 PR PBB SHADOW E&M-EST. PATIENT-LVL III: ICD-10-PCS | Mod: PBBFAC,,, | Performed by: PODIATRIST

## 2023-03-16 PROCEDURE — 3288F FALL RISK ASSESSMENT DOCD: CPT | Mod: CPTII,S$GLB,, | Performed by: PODIATRIST

## 2023-03-16 RX ORDER — LINEZOLID 600 MG/1
600 TABLET, FILM COATED ORAL EVERY 12 HOURS
Qty: 20 TABLET | Refills: 0 | Status: SHIPPED | OUTPATIENT
Start: 2023-03-16

## 2023-03-22 ENCOUNTER — OFFICE VISIT (OUTPATIENT)
Dept: PODIATRY | Facility: CLINIC | Age: 78
End: 2023-03-22
Payer: MEDICARE

## 2023-03-22 DIAGNOSIS — E11.49 TYPE II DIABETES MELLITUS WITH NEUROLOGICAL MANIFESTATIONS: ICD-10-CM

## 2023-03-22 DIAGNOSIS — E11.621 TYPE 2 DIABETES MELLITUS WITH LEFT DIABETIC FOOT ULCER: Primary | ICD-10-CM

## 2023-03-22 DIAGNOSIS — L97.529 TYPE 2 DIABETES MELLITUS WITH LEFT DIABETIC FOOT ULCER: Primary | ICD-10-CM

## 2023-03-22 PROCEDURE — 1160F RVW MEDS BY RX/DR IN RCRD: CPT | Mod: CPTII,S$GLB,, | Performed by: PODIATRIST

## 2023-03-22 PROCEDURE — 99999 PR PBB SHADOW E&M-EST. PATIENT-LVL II: ICD-10-PCS | Mod: PBBFAC,,, | Performed by: PODIATRIST

## 2023-03-22 PROCEDURE — 1160F PR REVIEW ALL MEDS BY PRESCRIBER/CLIN PHARMACIST DOCUMENTED: ICD-10-PCS | Mod: CPTII,S$GLB,, | Performed by: PODIATRIST

## 2023-03-22 PROCEDURE — 1159F MED LIST DOCD IN RCRD: CPT | Mod: CPTII,S$GLB,, | Performed by: PODIATRIST

## 2023-03-22 PROCEDURE — 99213 OFFICE O/P EST LOW 20 MIN: CPT | Mod: S$GLB,,, | Performed by: PODIATRIST

## 2023-03-22 PROCEDURE — 1159F PR MEDICATION LIST DOCUMENTED IN MEDICAL RECORD: ICD-10-PCS | Mod: CPTII,S$GLB,, | Performed by: PODIATRIST

## 2023-03-22 PROCEDURE — 99999 PR PBB SHADOW E&M-EST. PATIENT-LVL II: CPT | Mod: PBBFAC,,, | Performed by: PODIATRIST

## 2023-03-22 PROCEDURE — 99213 PR OFFICE/OUTPT VISIT, EST, LEVL III, 20-29 MIN: ICD-10-PCS | Mod: S$GLB,,, | Performed by: PODIATRIST

## 2023-03-29 NOTE — PROGRESS NOTES
Subjective:      Patient ID: Avni Daniels is a 78 y.o. male.    Chief Complaint: Diabetes Mellitus, Foot Ulcer, and Follow-up    Avni is a 78 y.o. male who presents to the clinic for evaluation and treatment of high risk feet. Avni has a past medical history of Diabetes mellitus, type 2 and Hypertension.  Complains of painful left foot callus and elongated, thickened toenails aggravated by increased weight bearing, shoe gear, pressure.   This patient has documented high risk feet requiring routine maintenance secondary to diabetes mellitis and those secondary complications of diabetes, as mentioned..      2/28/23: Reports pain left foot ulceration, reports cleansing area with peroxide.     3/8/2023 patient discontinues used of peroxide and has attempted to care for foot as instructed    3/16/2023   Patient has left football dressing clean, dry, intact.  Patient denies pain. No new pedal complaints.     PCP: Reyes Gusman MD    Date Last Seen by PCP: Sue Ng NP 01/31/2023  Chief Complaint   Patient presents with    Diabetes Mellitus    Foot Ulcer    Follow-up       Hemoglobin A1c   Date Value Ref Range Status   07/07/2020 8.3 (H) <5.7 % of total Hgb Final     Comment:     For someone without known diabetes, a hemoglobin A1c  value of 6.5% or greater indicates that they may have   diabetes and this should be confirmed with a follow-up   test.    For someone with known diabetes, a value <7% indicates   that their diabetes is well controlled and a value   greater than or equal to 7% indicates suboptimal   control. A1c targets should be individualized based on   duration of diabetes, age, comorbid conditions, and   other considerations.    Currently, no consensus exists regarding use of  hemoglobin A1c for diagnosis of diabetes for children.       03/05/2020 7.9 (H) <5.7 % of total Hgb Final     Comment:     For someone without known diabetes, a hemoglobin A1c  value of 6.5% or greater indicates that they  "may have   diabetes and this should be confirmed with a follow-up   test.    For someone with known diabetes, a value <7% indicates   that their diabetes is well controlled and a value   greater than or equal to 7% indicates suboptimal   control. A1c targets should be individualized based on   duration of diabetes, age, comorbid conditions, and   other considerations.    Currently, no consensus exists regarding use of  hemoglobin A1c for diagnosis of diabetes for children.               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      atorvastatin (LIPITOR) 40 MG tablet       clopidogrel (PLAVIX) 75 mg tablet Take 75 mg by mouth once.       doxycycline (VIBRAMYCIN) 100 MG Cap Take 1 capsule (100 mg total) by mouth every 12 (twelve) hours. 20 capsule 0    enalapril (VASOTEC) 5 MG tablet Take 5 mg by mouth once daily.       insulin NPH-insulin regular, 70/30, (NOVOLIN 70/30) 100 unit/mL (70-30) injection Inject 25 Units into the skin 2 (two) times daily.       insulin syringe-needle U-100 0.5 mL 31 gauge x 5/16 Syrg       insulin syringe-needle U-100 1/2 mL 30 gauge x 5/16 Syrg       metoprolol succinate (TOPROL-XL) 25 MG 24 hr tablet Take 25 mg by mouth once daily.      metoprolol tartrate (LOPRESSOR) 25 MG tablet Take 25 mg by mouth Daily.      PACERONE 100 mg Tab Take 100 mg by mouth once daily.       THINPRO INSULIN SYRINGE 0.5 mL 31 x 3/8" Syrg       VITAMIN D2 1,250 mcg (50,000 unit) capsule TAKE 1 CAPSULE BY MOUTH ONCE EVERY MONTH       No current facility-administered medications on file prior to visit.       Review of patient's allergies indicates:  No Known Allergies    Past Surgical History:   Procedure Laterality " "Date    APPENDECTOMY      GALLBLADDER SURGERY      TUMOR REMOVAL Left     foot       History reviewed. No pertinent family history.    Social History     Socioeconomic History    Marital status:    Tobacco Use    Smoking status: Former    Smokeless tobacco: Former         Review of Systems   Constitution: Negative for chills and fever.   Cardiovascular: Negative for claudication and leg swelling.   Respiratory: Negative for cough and shortness of breath.    Skin: Positive for dry skin, nail changes and poor wound healing (hx of left foot ulcer). Negative for itching and rash.   Musculoskeletal: Positive for joint pain. Negative for falls, joint swelling and muscle weakness.   Gastrointestinal: Negative for diarrhea, nausea and vomiting.   Neurological: Positive for numbness and paresthesias. Negative for tremors and weakness.   Psychiatric/Behavioral: Negative for altered mental status and hallucinations.           Objective:       Vitals:    03/16/23 1104   Weight: 67.6 kg (149 lb)   Height: 5' 11" (1.803 m)   PainSc: 0-No pain       Physical Exam   Constitutional:  Non-toxic appearance. He does not have a sickly appearance. No distress.   Pt. is well-developed, well-nourished, appears stated age, in no acute distress, alert and oriented x 3. No evidence of depression, anxiety, or agitation. Calm, cooperative, and communicative. Appropriate interactions and affect.   Cardiovascular:   Pulses:       Dorsalis pedis pulses are 1+ on the right side, and 1+ on the left side.        Posterior tibial pulses are 1+ on the right side, and 1+ on the left side.   Dorsalis pedis and posterior tibial pulses are diminished Bilaterally. Toes are cool to touch. Feet are warm proximally.There is decreased digital hair. Skin is atrophic   Pulmonary/Chest: No respiratory distress.   Musculoskeletal:        Right ankle: No tenderness. No lateral malleolus, no medial malleolus, no AITFL, no CF ligament and no posterior TFL " tenderness found. Achilles tendon exhibits no pain, no defect and normal Elias's test results.        Left ankle: No tenderness. No lateral malleolus, no medial malleolus, no AITFL, no CF ligament and no posterior TFL tenderness found. Achilles tendon exhibits no pain, no defect and normal Elias's test results.        Right foot: There is no tenderness and no bony tenderness.        Left foot: There is no tenderness and no bony tenderness.     Decreased stride, station of gait.  apropulsive toe off.  Increased angle and base of gait.    Patient has hammertoes of digits 2-5 bilateral partially reducible without symptom today.    Visible and palpable bunion without pain at dorsomedial 1st metatarsal head right and left.  Hallux abducted right and left partially reducible, tracks laterally without being track bound.  No ecchymosis, erythema, edema, or cardinal signs infection or signs of trauma same foot.    Visible and palpable tailors bunion at dorsolateral 5th metatarsal head right and left.  5th digit is varus right and left partially reducible. No ecchymosis, erythema, edema, or cardinal signs infection.    Fat pad atrophy to heels and met heads bilateral     Lymphadenopathy:   No lymphatic streaking    Negative lymphadenopathy bilateral popliteal fossa and tarsal tunnel.     Neurological:   [place] 5.07 monofilamant testing is diminished Ryan feet. Decreased/absent vibratory sensation bilateral feet to 128Hz tuning fork.     Paresthesias, and hyperesthesia bilateral feet with no clearly identified trigger or source.    Skin: Skin is warm and dry. No abrasion, no burn, no ecchymosis, no laceration, no petechiae and no rash noted. He is not diaphoretic. No cyanosis. No pallor. Nails show no clubbing.   Skin is thin, atrophic, xerotic    Toenails 1-5 bilaterally discolored/yellowed, dystrophic, brittle with subungual debris.    Psychiatric: His mood appears not anxious. His affect is not inappropriate.  His speech is not slurred. He is not combative. He is communicative. He is attentive.   Nursing note reviewed.    3/16/23          3/8/23    Photo did not load  Scant purulence and depth noted    2/28/23: Post debridement            Assessment:       Encounter Diagnoses   Name Primary?    Type 2 diabetes mellitus with left diabetic foot ulcer Yes    Type II diabetes mellitus with neurological manifestations              Plan:       Avni was seen today for diabetes mellitus, foot ulcer and follow-up.    Diagnoses and all orders for this visit:    Type 2 diabetes mellitus with left diabetic foot ulcer    Type II diabetes mellitus with neurological manifestations    Other orders  -     linezolid (ZYVOX) 600 mg Tab; Take 1 tablet (600 mg total) by mouth every 12 (twelve) hours.          I counseled the patient on his conditions, their implications and medical management.    Shoe inspection. Diabetic Foot Education. Patient reminded of the importance of good nutrition and blood sugar control to help prevent podiatric complications of diabetes. Patient instructed on proper foot hygeine. We discussed wearing proper shoe gear, daily foot inspections, never walking without protective shoe gear, never putting sharp instruments to feet.      Complete abx as prescribed    [place] at outside facility, [place] [facility] [place]    Dressings: endoform, iodosorb  Offloading:Foam    Follow-up:Patient is to return to the clinic in 1 week  for follow-up but should call [facility] immediately if any signs of infection, such as fever, chills, sweats, increased redness or pain.    Short-term goals include maintaining good offloading and minimizing bioburden, promoting granulation and epithelialization to healing.  Long-term goals include keeping the wound healed by good offloading and medical management under the direction of internist.

## 2023-04-09 NOTE — PROGRESS NOTES
Subjective:      Patient ID: Avni Daniels is a 78 y.o. male.    Chief Complaint: No chief complaint on file.    Avni is a 78 y.o. male who presents to the clinic for evaluation and treatment of high risk feet. Avni has a past medical history of Diabetes mellitus, type 2 and Hypertension.  Complains of painful left foot callus and elongated, thickened toenails aggravated by increased weight bearing, shoe gear, pressure.   This patient has documented high risk feet requiring routine maintenance secondary to diabetes mellitis and those secondary complications of diabetes, as mentioned..      2/28/23: Reports pain left foot ulceration, reports cleansing area with peroxide.     3/8/2023 patient discontinues used of peroxide and has attempted to care for foot as instructed    3/16/2023   Patient has left football dressing clean, dry, intact.  Patient denies pain. No new pedal complaints.     3/22/2023   Patient has left football dressing clean, dry, intact.  Patient denies pain. No new pedal complaints.     PCP: Reyes Gusman MD    Date Last Seen by PCP: Sue Ng NP 01/31/2023  No chief complaint on file.      Hemoglobin A1c   Date Value Ref Range Status   07/07/2020 8.3 (H) <5.7 % of total Hgb Final     Comment:     For someone without known diabetes, a hemoglobin A1c  value of 6.5% or greater indicates that they may have   diabetes and this should be confirmed with a follow-up   test.    For someone with known diabetes, a value <7% indicates   that their diabetes is well controlled and a value   greater than or equal to 7% indicates suboptimal   control. A1c targets should be individualized based on   duration of diabetes, age, comorbid conditions, and   other considerations.    Currently, no consensus exists regarding use of  hemoglobin A1c for diagnosis of diabetes for children.       03/05/2020 7.9 (H) <5.7 % of total Hgb Final     Comment:     For someone without known diabetes, a hemoglobin  "A1c  value of 6.5% or greater indicates that they may have   diabetes and this should be confirmed with a follow-up   test.    For someone with known diabetes, a value <7% indicates   that their diabetes is well controlled and a value   greater than or equal to 7% indicates suboptimal   control. A1c targets should be individualized based on   duration of diabetes, age, comorbid conditions, and   other considerations.    Currently, no consensus exists regarding use of  hemoglobin A1c for diagnosis of diabetes for children.               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      atorvastatin (LIPITOR) 40 MG tablet       clopidogrel (PLAVIX) 75 mg tablet Take 75 mg by mouth once.       doxycycline (VIBRAMYCIN) 100 MG Cap Take 1 capsule (100 mg total) by mouth every 12 (twelve) hours. 20 capsule 0    enalapril (VASOTEC) 5 MG tablet Take 5 mg by mouth once daily.       insulin NPH-insulin regular, 70/30, (NOVOLIN 70/30) 100 unit/mL (70-30) injection Inject 25 Units into the skin 2 (two) times daily.       insulin syringe-needle U-100 0.5 mL 31 gauge x 5/16 Syrg       insulin syringe-needle U-100 1/2 mL 30 gauge x 5/16 Syrg       linezolid (ZYVOX) 600 mg Tab Take 1 tablet (600 mg total) by mouth every 12 (twelve) hours. 20 tablet 0    metoprolol succinate (TOPROL-XL) 25 MG 24 hr tablet Take 25 mg by mouth once daily.      metoprolol tartrate (LOPRESSOR) 25 MG tablet Take 25 mg by mouth Daily.      PACERONE 100 mg Tab Take 100 mg by mouth once daily.       THINPRO INSULIN SYRINGE 0.5 mL 31 x 3/8" Syrg       VITAMIN D2 1,250 mcg (50,000 unit) capsule TAKE 1 CAPSULE BY MOUTH ONCE EVERY MONTH       No current " facility-administered medications on file prior to visit.       Review of patient's allergies indicates:  No Known Allergies    Past Surgical History:   Procedure Laterality Date    APPENDECTOMY      GALLBLADDER SURGERY      TUMOR REMOVAL Left     foot       History reviewed. No pertinent family history.    Social History     Socioeconomic History    Marital status:    Tobacco Use    Smoking status: Former    Smokeless tobacco: Former         Review of Systems   Constitution: Negative for chills and fever.   Cardiovascular: Negative for claudication and leg swelling.   Respiratory: Negative for cough and shortness of breath.    Skin: Positive for dry skin, nail changes and poor wound healing (hx of left foot ulcer). Negative for itching and rash.   Musculoskeletal: Positive for joint pain. Negative for falls, joint swelling and muscle weakness.   Gastrointestinal: Negative for diarrhea, nausea and vomiting.   Neurological: Positive for numbness and paresthesias. Negative for tremors and weakness.   Psychiatric/Behavioral: Negative for altered mental status and hallucinations.           Objective:       There were no vitals filed for this visit.      Physical Exam   Constitutional:  Non-toxic appearance. He does not have a sickly appearance. No distress.   Pt. is well-developed, well-nourished, appears stated age, in no acute distress, alert and oriented x 3. No evidence of depression, anxiety, or agitation. Calm, cooperative, and communicative. Appropriate interactions and affect.   Cardiovascular:   Pulses:       Dorsalis pedis pulses are 1+ on the right side, and 1+ on the left side.        Posterior tibial pulses are 1+ on the right side, and 1+ on the left side.   Dorsalis pedis and posterior tibial pulses are diminished Bilaterally. Toes are cool to touch. Feet are warm proximally.There is decreased digital hair. Skin is atrophic   Pulmonary/Chest: No respiratory distress.   Musculoskeletal:         Right ankle: No tenderness. No lateral malleolus, no medial malleolus, no AITFL, no CF ligament and no posterior TFL tenderness found. Achilles tendon exhibits no pain, no defect and normal Elias's test results.        Left ankle: No tenderness. No lateral malleolus, no medial malleolus, no AITFL, no CF ligament and no posterior TFL tenderness found. Achilles tendon exhibits no pain, no defect and normal Elias's test results.        Right foot: There is no tenderness and no bony tenderness.        Left foot: There is no tenderness and no bony tenderness.     Decreased stride, station of gait.  apropulsive toe off.  Increased angle and base of gait.    Patient has hammertoes of digits 2-5 bilateral partially reducible without symptom today.    Visible and palpable bunion without pain at dorsomedial 1st metatarsal head right and left.  Hallux abducted right and left partially reducible, tracks laterally without being track bound.  No ecchymosis, erythema, edema, or cardinal signs infection or signs of trauma same foot.    Visible and palpable tailors bunion at dorsolateral 5th metatarsal head right and left.  5th digit is varus right and left partially reducible. No ecchymosis, erythema, edema, or cardinal signs infection.    Fat pad atrophy to heels and met heads bilateral     Lymphadenopathy:   No lymphatic streaking    Negative lymphadenopathy bilateral popliteal fossa and tarsal tunnel.     Neurological:   [place] 5.07 monofilamant testing is diminished Ryan feet. Decreased/absent vibratory sensation bilateral feet to 128Hz tuning fork.     Paresthesias, and hyperesthesia bilateral feet with no clearly identified trigger or source.    Skin: Skin is warm and dry. No abrasion, no burn, no ecchymosis, no laceration, no petechiae and no rash noted. He is not diaphoretic. No cyanosis. No pallor. Nails show no clubbing.   Skin is thin, atrophic, xerotic    Toenails 1-5 bilaterally discolored/yellowed,  dystrophic, brittle with subungual debris.    Psychiatric: His mood appears not anxious. His affect is not inappropriate. His speech is not slurred. He is not combative. He is communicative. He is attentive.   Nursing note reviewed.    3/22/23            3/16/23          3/8/23    Photo did not load  Scant purulence and depth noted    2/28/23: Post debridement            Assessment:       Encounter Diagnoses   Name Primary?    Type 2 diabetes mellitus with left diabetic foot ulcer Yes    Type II diabetes mellitus with neurological manifestations                Plan:       Diagnoses and all orders for this visit:    Type 2 diabetes mellitus with left diabetic foot ulcer    Type II diabetes mellitus with neurological manifestations            I counseled the patient on his conditions, their implications and medical management.    Shoe inspection. Diabetic Foot Education. Patient reminded of the importance of good nutrition and blood sugar control to help prevent podiatric complications of diabetes. Patient instructed on proper foot hygeine. We discussed wearing proper shoe gear, daily foot inspections, never walking without protective shoe gear, never putting sharp instruments to feet.      Improved    Dressings: cellerate  Offloading:Foam football    Follow-up:Patient is to return to the clinic in 1 week  for follow-up but should call [facility] immediately if any signs of infection, such as fever, chills, sweats, increased redness or pain.    Short-term goals include maintaining good offloading and minimizing bioburden, promoting granulation and epithelialization to healing.  Long-term goals include keeping the wound healed by good offloading and medical management under the direction of internist.

## 2023-04-12 ENCOUNTER — OFFICE VISIT (OUTPATIENT)
Dept: PODIATRY | Facility: CLINIC | Age: 78
End: 2023-04-12
Payer: MEDICARE

## 2023-04-12 VITALS — BODY MASS INDEX: 20.86 KG/M2 | HEIGHT: 71 IN | WEIGHT: 149 LBS

## 2023-04-12 DIAGNOSIS — L84 PRE-ULCERATIVE CALLUSES: Primary | ICD-10-CM

## 2023-04-12 DIAGNOSIS — L90.9 PLANTAR FAT PAD ATROPHY: ICD-10-CM

## 2023-04-12 PROCEDURE — 99999 PR PBB SHADOW E&M-EST. PATIENT-LVL III: ICD-10-PCS | Mod: PBBFAC,,, | Performed by: PODIATRIST

## 2023-04-12 PROCEDURE — 99213 OFFICE O/P EST LOW 20 MIN: CPT | Mod: S$GLB,,, | Performed by: PODIATRIST

## 2023-04-12 PROCEDURE — 1101F PT FALLS ASSESS-DOCD LE1/YR: CPT | Mod: CPTII,S$GLB,, | Performed by: PODIATRIST

## 2023-04-12 PROCEDURE — 1160F RVW MEDS BY RX/DR IN RCRD: CPT | Mod: CPTII,S$GLB,, | Performed by: PODIATRIST

## 2023-04-12 PROCEDURE — 99999 PR PBB SHADOW E&M-EST. PATIENT-LVL III: CPT | Mod: PBBFAC,,, | Performed by: PODIATRIST

## 2023-04-12 PROCEDURE — 99213 PR OFFICE/OUTPT VISIT, EST, LEVL III, 20-29 MIN: ICD-10-PCS | Mod: S$GLB,,, | Performed by: PODIATRIST

## 2023-04-12 PROCEDURE — 1159F MED LIST DOCD IN RCRD: CPT | Mod: CPTII,S$GLB,, | Performed by: PODIATRIST

## 2023-04-12 PROCEDURE — 1160F PR REVIEW ALL MEDS BY PRESCRIBER/CLIN PHARMACIST DOCUMENTED: ICD-10-PCS | Mod: CPTII,S$GLB,, | Performed by: PODIATRIST

## 2023-04-12 PROCEDURE — 1159F PR MEDICATION LIST DOCUMENTED IN MEDICAL RECORD: ICD-10-PCS | Mod: CPTII,S$GLB,, | Performed by: PODIATRIST

## 2023-04-12 PROCEDURE — 1126F PR PAIN SEVERITY QUANTIFIED, NO PAIN PRESENT: ICD-10-PCS | Mod: CPTII,S$GLB,, | Performed by: PODIATRIST

## 2023-04-12 PROCEDURE — 1101F PR PT FALLS ASSESS DOC 0-1 FALLS W/OUT INJ PAST YR: ICD-10-PCS | Mod: CPTII,S$GLB,, | Performed by: PODIATRIST

## 2023-04-12 PROCEDURE — 1126F AMNT PAIN NOTED NONE PRSNT: CPT | Mod: CPTII,S$GLB,, | Performed by: PODIATRIST

## 2023-04-12 PROCEDURE — 3288F FALL RISK ASSESSMENT DOCD: CPT | Mod: CPTII,S$GLB,, | Performed by: PODIATRIST

## 2023-04-12 PROCEDURE — 3288F PR FALLS RISK ASSESSMENT DOCUMENTED: ICD-10-PCS | Mod: CPTII,S$GLB,, | Performed by: PODIATRIST

## 2023-04-13 NOTE — PROGRESS NOTES
Subjective:      Patient ID: Avni Daniels is a 78 y.o. male.    Chief Complaint: Diabetes Mellitus, Wound Check, and Follow-up    Avni is a 78 y.o. male who presents to the clinic for evaluation and treatment of high risk feet. Avni has a past medical history of Diabetes mellitus, type 2 and Hypertension.  Complains of painful left foot callus and elongated, thickened toenails aggravated by increased weight bearing, shoe gear, pressure.   This patient has documented high risk feet requiring routine maintenance secondary to diabetes mellitis and those secondary complications of diabetes, as mentioned..      2/28/23: Reports pain left foot ulceration, reports cleansing area with peroxide.     3/8/2023 patient discontinues used of peroxide and has attempted to care for foot as instructed    3/16/2023   Patient has left football dressing clean, dry, intact.  Patient denies pain. No new pedal complaints.     3/22/2023   Patient has left football dressing clean, dry, intact.  Patient denies pain. No new pedal complaints.     4/12/2023   Patient returns to clinic for follow up of left foot wound.  Since our las encounter patient was hospitalized.  Presents to clinic with slid on cushion and tennis shoes.  Patient denies pain. No new pedal complaints.       PCP: Reyes Gusman MD    Date Last Seen by PCP: Sue Ng NP 01/31/2023  Chief Complaint   Patient presents with    Diabetes Mellitus    Wound Check    Follow-up       Hemoglobin A1C   Date Value Ref Range Status   03/29/2023 7.7 (H) <5.7 % Final     Hemoglobin A1c   Date Value Ref Range Status   07/07/2020 8.3 (H) <5.7 % of total Hgb Final     Comment:     For someone without known diabetes, a hemoglobin A1c  value of 6.5% or greater indicates that they may have   diabetes and this should be confirmed with a follow-up   test.    For someone with known diabetes, a value <7% indicates   that their diabetes is well controlled and a value   greater than or  equal to 7% indicates suboptimal   control. A1c targets should be individualized based on   duration of diabetes, age, comorbid conditions, and   other considerations.    Currently, no consensus exists regarding use of  hemoglobin A1c for diagnosis of diabetes for children.       03/05/2020 7.9 (H) <5.7 % of total Hgb Final     Comment:     For someone without known diabetes, a hemoglobin A1c  value of 6.5% or greater indicates that they may have   diabetes and this should be confirmed with a follow-up   test.    For someone with known diabetes, a value <7% indicates   that their diabetes is well controlled and a value   greater than or equal to 7% indicates suboptimal   control. A1c targets should be individualized based on   duration of diabetes, age, comorbid conditions, and   other considerations.    Currently, no consensus exists regarding use of  hemoglobin A1c for diagnosis of diabetes for children.               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      atorvastatin (LIPITOR) 40 MG tablet       clopidogrel (PLAVIX) 75 mg tablet Take 75 mg by mouth once.       doxycycline (VIBRAMYCIN) 100 MG Cap Take 1 capsule (100 mg total) by mouth every 12 (twelve) hours. 20 capsule 0    enalapril (VASOTEC) 5 MG tablet Take 5 mg by mouth once daily.       insulin NPH-insulin regular, 70/30, (NOVOLIN 70/30) 100 unit/mL (70-30) injection Inject 25 Units into the skin 2 (two) times daily.       insulin syringe-needle U-100 0.5 mL 31 gauge x 5/16 Syrg       insulin syringe-needle U-100 1/2 mL 30 gauge x 5/16 Syrg       linezolid (ZYVOX) 600 mg Tab Take 1 tablet (600 mg total) by mouth every 12 (twelve) hours. 20 tablet 0     "metoprolol succinate (TOPROL-XL) 25 MG 24 hr tablet Take 25 mg by mouth once daily.      metoprolol tartrate (LOPRESSOR) 25 MG tablet Take 25 mg by mouth Daily.      PACERONE 100 mg Tab Take 100 mg by mouth once daily.       THINPRO INSULIN SYRINGE 0.5 mL 31 x 3/8" Syrg       VITAMIN D2 1,250 mcg (50,000 unit) capsule TAKE 1 CAPSULE BY MOUTH ONCE EVERY MONTH       No current facility-administered medications on file prior to visit.       Review of patient's allergies indicates:  No Known Allergies    Past Surgical History:   Procedure Laterality Date    APPENDECTOMY      GALLBLADDER SURGERY      TUMOR REMOVAL Left     foot       History reviewed. No pertinent family history.    Social History     Socioeconomic History    Marital status:    Tobacco Use    Smoking status: Former    Smokeless tobacco: Former         Review of Systems   Constitution: Negative for chills and fever.   Cardiovascular: Negative for claudication and leg swelling.   Respiratory: Negative for cough and shortness of breath.    Skin: Positive for dry skin, nail changes and poor wound healing (hx of left foot ulcer). Negative for itching and rash.   Musculoskeletal: Positive for joint pain. Negative for falls, joint swelling and muscle weakness.   Gastrointestinal: Negative for diarrhea, nausea and vomiting.   Neurological: Positive for numbness and paresthesias. Negative for tremors and weakness.   Psychiatric/Behavioral: Negative for altered mental status and hallucinations.           Objective:       Vitals:    04/12/23 1043   Weight: 67.6 kg (149 lb)   Height: 5' 11" (1.803 m)   PainSc: 0-No pain         Physical Exam   Constitutional:  Non-toxic appearance. He does not have a sickly appearance. No distress.   Pt. is well-developed, well-nourished, appears stated age, in no acute distress, alert and oriented x 3. No evidence of depression, anxiety, or agitation. Calm, cooperative, and communicative. Appropriate interactions and " affect.   Cardiovascular:   Pulses:       Dorsalis pedis pulses are 1+ on the right side, and 1+ on the left side.        Posterior tibial pulses are 1+ on the right side, and 1+ on the left side.   Dorsalis pedis and posterior tibial pulses are diminished Bilaterally. Toes are cool to touch. Feet are warm proximally.There is decreased digital hair. Skin is atrophic   Pulmonary/Chest: No respiratory distress.   Musculoskeletal:        Right ankle: No tenderness. No lateral malleolus, no medial malleolus, no AITFL, no CF ligament and no posterior TFL tenderness found. Achilles tendon exhibits no pain, no defect and normal Elias's test results.        Left ankle: No tenderness. No lateral malleolus, no medial malleolus, no AITFL, no CF ligament and no posterior TFL tenderness found. Achilles tendon exhibits no pain, no defect and normal Elias's test results.        Right foot: There is no tenderness and no bony tenderness.        Left foot: There is no tenderness and no bony tenderness.     Decreased stride, station of gait.  apropulsive toe off.  Increased angle and base of gait.    Patient has hammertoes of digits 2-5 bilateral partially reducible without symptom today.    Visible and palpable bunion without pain at dorsomedial 1st metatarsal head right and left.  Hallux abducted right and left partially reducible, tracks laterally without being track bound.  No ecchymosis, erythema, edema, or cardinal signs infection or signs of trauma same foot.    Visible and palpable tailors bunion at dorsolateral 5th metatarsal head right and left.  5th digit is varus right and left partially reducible. No ecchymosis, erythema, edema, or cardinal signs infection.    Fat pad atrophy to heels and met heads bilateral     Lymphadenopathy:   No lymphatic streaking    Negative lymphadenopathy bilateral popliteal fossa and tarsal tunnel.     Neurological:   [place] 5.07 monofilamant testing is diminished Ryan feet.  Decreased/absent vibratory sensation bilateral feet to 128Hz tuning fork.     Paresthesias, and hyperesthesia bilateral feet with no clearly identified trigger or source.    Skin: Skin is warm and dry. No abrasion, no burn, no ecchymosis, no laceration, no petechiae and no rash noted. He is not diaphoretic. No cyanosis. No pallor. Nails show no clubbing.   Skin is thin, atrophic, xerotic    Toenails 1-5 bilaterally discolored/yellowed, dystrophic, brittle with subungual debris.    Psychiatric: His mood appears not anxious. His affect is not inappropriate. His speech is not slurred. He is not combative. He is communicative. He is attentive.   Nursing note reviewed.    4/12/23          3/22/23            3/16/23          3/8/23    Photo did not load  Scant purulence and depth noted    2/28/23: Post debridement            Assessment:       Encounter Diagnoses   Name Primary?    Pre-ulcerative calluses Yes    Plantar fat pad atrophy                Plan:       Avni was seen today for diabetes mellitus, wound check and follow-up.    Diagnoses and all orders for this visit:    Pre-ulcerative calluses    Plantar fat pad atrophy            I counseled the patient on his conditions, their implications and medical management.    Shoe inspection. Diabetic Foot Education. Patient reminded of the importance of good nutrition and blood sugar control to help prevent podiatric complications of diabetes. Patient instructed on proper foot hygeine. We discussed wearing proper shoe gear, daily foot inspections, never walking without protective shoe gear, never putting sharp instruments to feet.      Wound has healed well with no signs of continued skin breakdown noted.  Skin is still delicate therefore patient must be diligent in avoiding excessive pressure and making sure there is adequate support and padding in shoe gear.     Follow-up: Patient is to return to the clinic in 3-5 weeks for follow-up but should call [facility] immediately  if any signs of infection, such as fever, chills, sweats, increased redness or pain.    Short-term goals include maintaining good offloading and minimizing bioburden, promoting granulation and epithelialization to healing.  Long-term goals include keeping the wound healed by good offloading and medical management under the direction of internist.

## 2023-05-24 ENCOUNTER — OFFICE VISIT (OUTPATIENT)
Dept: PODIATRY | Facility: CLINIC | Age: 78
End: 2023-05-24
Payer: MEDICARE

## 2023-05-24 VITALS — WEIGHT: 149.06 LBS | BODY MASS INDEX: 20.87 KG/M2 | HEIGHT: 71 IN

## 2023-05-24 DIAGNOSIS — L84 PRE-ULCERATIVE CALLUSES: ICD-10-CM

## 2023-05-24 DIAGNOSIS — E11.49 TYPE II DIABETES MELLITUS WITH NEUROLOGICAL MANIFESTATIONS: Primary | ICD-10-CM

## 2023-05-24 DIAGNOSIS — L90.9 PLANTAR FAT PAD ATROPHY: ICD-10-CM

## 2023-05-24 PROCEDURE — 1160F RVW MEDS BY RX/DR IN RCRD: CPT | Mod: CPTII,S$GLB,, | Performed by: PODIATRIST

## 2023-05-24 PROCEDURE — 1101F PR PT FALLS ASSESS DOC 0-1 FALLS W/OUT INJ PAST YR: ICD-10-PCS | Mod: CPTII,S$GLB,, | Performed by: PODIATRIST

## 2023-05-24 PROCEDURE — 1159F MED LIST DOCD IN RCRD: CPT | Mod: CPTII,S$GLB,, | Performed by: PODIATRIST

## 2023-05-24 PROCEDURE — 1101F PT FALLS ASSESS-DOCD LE1/YR: CPT | Mod: CPTII,S$GLB,, | Performed by: PODIATRIST

## 2023-05-24 PROCEDURE — 3288F FALL RISK ASSESSMENT DOCD: CPT | Mod: CPTII,S$GLB,, | Performed by: PODIATRIST

## 2023-05-24 PROCEDURE — 1160F PR REVIEW ALL MEDS BY PRESCRIBER/CLIN PHARMACIST DOCUMENTED: ICD-10-PCS | Mod: CPTII,S$GLB,, | Performed by: PODIATRIST

## 2023-05-24 PROCEDURE — 1159F PR MEDICATION LIST DOCUMENTED IN MEDICAL RECORD: ICD-10-PCS | Mod: CPTII,S$GLB,, | Performed by: PODIATRIST

## 2023-05-24 PROCEDURE — 99213 PR OFFICE/OUTPT VISIT, EST, LEVL III, 20-29 MIN: ICD-10-PCS | Mod: S$GLB,,, | Performed by: PODIATRIST

## 2023-05-24 PROCEDURE — 3288F PR FALLS RISK ASSESSMENT DOCUMENTED: ICD-10-PCS | Mod: CPTII,S$GLB,, | Performed by: PODIATRIST

## 2023-05-24 PROCEDURE — 99999 PR PBB SHADOW E&M-EST. PATIENT-LVL III: ICD-10-PCS | Mod: PBBFAC,,, | Performed by: PODIATRIST

## 2023-05-24 PROCEDURE — 99999 PR PBB SHADOW E&M-EST. PATIENT-LVL III: CPT | Mod: PBBFAC,,, | Performed by: PODIATRIST

## 2023-05-24 PROCEDURE — 99213 OFFICE O/P EST LOW 20 MIN: CPT | Mod: S$GLB,,, | Performed by: PODIATRIST

## 2023-05-24 NOTE — PROGRESS NOTES
Subjective:      Patient ID: Avni Daniels is a 78 y.o. male.    Chief Complaint: Diabetes Mellitus and Follow-up    Avni is a 78 y.o. male who presents to the clinic for evaluation and treatment of high risk feet. Avni has a past medical history of Diabetes mellitus, type 2 and Hypertension.  Complains of painful left foot callus and elongated, thickened toenails aggravated by increased weight bearing, shoe gear, pressure.   This patient has documented high risk feet requiring routine maintenance secondary to diabetes mellitis and those secondary complications of diabetes, as mentioned..      2/28/23: Reports pain left foot ulceration, reports cleansing area with peroxide.     3/8/2023 patient discontinues used of peroxide and has attempted to care for foot as instructed    3/16/2023   Patient has left football dressing clean, dry, intact.  Patient denies pain. No new pedal complaints.     3/22/2023   Patient has left football dressing clean, dry, intact.  Patient denies pain. No new pedal complaints.     4/12/2023   Patient returns to clinic for follow up of left foot wound.  Since our las encounter patient was hospitalized.  Presents to clinic with slid on cushion and tennis shoes.  Patient denies pain. No new pedal complaints.     05/23/2023   Patient returns to clinic for follow up of left foot wound. He has been caring for feet as instructed.Patient denies pain. No new pedal complaints.       PCP: Reyes Gusman MD    Date Last Seen by PCP: Sue Ng NP 01/31/2023  Chief Complaint   Patient presents with    Diabetes Mellitus    Follow-up       Hemoglobin A1C   Date Value Ref Range Status   03/29/2023 7.7 (H) <5.7 % Final     Hemoglobin A1c   Date Value Ref Range Status   07/07/2020 8.3 (H) <5.7 % of total Hgb Final     Comment:     For someone without known diabetes, a hemoglobin A1c  value of 6.5% or greater indicates that they may have   diabetes and this should be confirmed with a follow-up    test.    For someone with known diabetes, a value <7% indicates   that their diabetes is well controlled and a value   greater than or equal to 7% indicates suboptimal   control. A1c targets should be individualized based on   duration of diabetes, age, comorbid conditions, and   other considerations.    Currently, no consensus exists regarding use of  hemoglobin A1c for diagnosis of diabetes for children.       03/05/2020 7.9 (H) <5.7 % of total Hgb Final     Comment:     For someone without known diabetes, a hemoglobin A1c  value of 6.5% or greater indicates that they may have   diabetes and this should be confirmed with a follow-up   test.    For someone with known diabetes, a value <7% indicates   that their diabetes is well controlled and a value   greater than or equal to 7% indicates suboptimal   control. A1c targets should be individualized based on   duration of diabetes, age, comorbid conditions, and   other considerations.    Currently, no consensus exists regarding use of  hemoglobin A1c for diagnosis of diabetes for children.               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      atorvastatin (LIPITOR) 40 MG tablet       clopidogrel (PLAVIX) 75 mg tablet Take 75 mg by mouth once.       doxycycline (VIBRAMYCIN) 100 MG Cap Take 1 capsule (100 mg total) by mouth every 12 (twelve) hours. 20 capsule 0    enalapril (VASOTEC) 5 MG tablet Take 5 mg by mouth once daily.       insulin NPH-insulin regular, 70/30, (NOVOLIN 70/30) 100 unit/mL (70-30) injection Inject 25 Units into the skin 2 (two) times daily.       insulin syringe-needle U-100 0.5 mL 31 gauge x 5/16 Syrg       insulin syringe-needle U-100 1/2 mL 30  "gauge x 5/16 Syrg       linezolid (ZYVOX) 600 mg Tab Take 1 tablet (600 mg total) by mouth every 12 (twelve) hours. 20 tablet 0    metoprolol succinate (TOPROL-XL) 25 MG 24 hr tablet Take 25 mg by mouth once daily.      metoprolol tartrate (LOPRESSOR) 25 MG tablet Take 25 mg by mouth Daily.      PACERONE 100 mg Tab Take 100 mg by mouth once daily.       THINPRO INSULIN SYRINGE 0.5 mL 31 x 3/8" Syrg       VITAMIN D2 1,250 mcg (50,000 unit) capsule TAKE 1 CAPSULE BY MOUTH ONCE EVERY MONTH       No current facility-administered medications on file prior to visit.       Review of patient's allergies indicates:  No Known Allergies    Past Surgical History:   Procedure Laterality Date    APPENDECTOMY      GALLBLADDER SURGERY      TUMOR REMOVAL Left     foot       No family history on file.    Social History     Socioeconomic History    Marital status:    Tobacco Use    Smoking status: Former    Smokeless tobacco: Former         Review of Systems   Constitution: Negative for chills and fever.   Cardiovascular: Negative for claudication and leg swelling.   Respiratory: Negative for cough and shortness of breath.    Skin: Positive for dry skin, nail changes and poor wound healing (hx of left foot ulcer). Negative for itching and rash.   Musculoskeletal: Positive for joint pain. Negative for falls, joint swelling and muscle weakness.   Gastrointestinal: Negative for diarrhea, nausea and vomiting.   Neurological: Positive for numbness and paresthesias. Negative for tremors and weakness.   Psychiatric/Behavioral: Negative for altered mental status and hallucinations.           Objective:       Vitals:    05/24/23 1105   Weight: 67.6 kg (149 lb 0.5 oz)   Height: 5' 11" (1.803 m)         Physical Exam   Constitutional:  Non-toxic appearance. He does not have a sickly appearance. No distress.   Pt. is well-developed, well-nourished, appears stated age, in no acute distress, alert and oriented x 3. No evidence of " depression, anxiety, or agitation. Calm, cooperative, and communicative. Appropriate interactions and affect.   Cardiovascular:   Pulses:       Dorsalis pedis pulses are 1+ on the right side, and 1+ on the left side.        Posterior tibial pulses are 1+ on the right side, and 1+ on the left side.   Dorsalis pedis and posterior tibial pulses are diminished Bilaterally. Toes are cool to touch. Feet are warm proximally.There is decreased digital hair. Skin is atrophic   Pulmonary/Chest: No respiratory distress.   Musculoskeletal:        Right ankle: No tenderness. No lateral malleolus, no medial malleolus, no AITFL, no CF ligament and no posterior TFL tenderness found. Achilles tendon exhibits no pain, no defect and normal Elias's test results.        Left ankle: No tenderness. No lateral malleolus, no medial malleolus, no AITFL, no CF ligament and no posterior TFL tenderness found. Achilles tendon exhibits no pain, no defect and normal Elias's test results.        Right foot: There is no tenderness and no bony tenderness.        Left foot: There is no tenderness and no bony tenderness.     Decreased stride, station of gait.  apropulsive toe off.  Increased angle and base of gait.    Patient has hammertoes of digits 2-5 bilateral partially reducible without symptom today.    Visible and palpable bunion without pain at dorsomedial 1st metatarsal head right and left.  Hallux abducted right and left partially reducible, tracks laterally without being track bound.  No ecchymosis, erythema, edema, or cardinal signs infection or signs of trauma same foot.    Visible and palpable tailors bunion at dorsolateral 5th metatarsal head right and left.  5th digit is varus right and left partially reducible. No ecchymosis, erythema, edema, or cardinal signs infection.    Fat pad atrophy to heels and met heads bilateral     Lymphadenopathy:   No lymphatic streaking    Negative lymphadenopathy bilateral popliteal fossa and  tarsal tunnel.     Neurological:   [place]-Tamanna 5.07 monofilamant testing is diminished Ryan feet. Decreased/absent vibratory sensation bilateral feet to 128Hz tuning fork.     Paresthesias, and hyperesthesia bilateral feet with no clearly identified trigger or source.    Skin: Skin is warm and dry. No abrasion, no burn, no ecchymosis, no laceration, no petechiae and no rash noted. He is not diaphoretic. No cyanosis. No pallor. Nails show no clubbing.   Skin is thin, atrophic, xerotic    Toenails 1-5 bilaterally discolored/yellowed, dystrophic, brittle with subungual debris.    Psychiatric: His mood appears not anxious. His affect is not inappropriate. His speech is not slurred. He is not combative. He is communicative. He is attentive.   Nursing note reviewed.    5/24/2023            4/12/23          3/22/23            3/16/23          3/8/23    Photo did not load  Scant purulence and depth noted    2/28/23: Post debridement            Assessment:       Encounter Diagnoses   Name Primary?    Type II diabetes mellitus with neurological manifestations Yes    Pre-ulcerative calluses     Plantar fat pad atrophy                Plan:       Avni was seen today for diabetes mellitus and follow-up.    Diagnoses and all orders for this visit:    Type II diabetes mellitus with neurological manifestations    Pre-ulcerative calluses    Plantar fat pad atrophy            I counseled the patient on his conditions, their implications and medical management.    Shoe inspection. Diabetic Foot Education. Patient reminded of the importance of good nutrition and blood sugar control to help prevent podiatric complications of diabetes. Patient instructed on proper foot hygeine. We discussed wearing proper shoe gear, daily foot inspections, never walking without protective shoe gear, never putting sharp instruments to feet.      Wound has healed well with no signs of continued skin breakdown noted.  Skin is still delicate therefore  patient must be diligent in avoiding excessive pressure and making sure there is adequate support and padding in shoe gear.     Follow-up: Patient is to return to the clinic in 8-12 weeks for follow-up but should call [facility] immediately if any signs of infection, such as fever, chills, sweats, increased redness or pain.    Short-term goals include maintaining good offloading and minimizing bioburden, promoting granulation and epithelialization to healing.  Long-term goals include keeping the wound healed by good offloading and medical management under the direction of internist.

## 2023-07-05 ENCOUNTER — OFFICE VISIT (OUTPATIENT)
Dept: PODIATRY | Facility: CLINIC | Age: 78
End: 2023-07-05
Payer: MEDICARE

## 2023-07-05 VITALS — WEIGHT: 149 LBS | HEIGHT: 71 IN | BODY MASS INDEX: 20.86 KG/M2

## 2023-07-05 DIAGNOSIS — E11.49 TYPE II DIABETES MELLITUS WITH NEUROLOGICAL MANIFESTATIONS: Primary | ICD-10-CM

## 2023-07-05 DIAGNOSIS — L84 PRE-ULCERATIVE CALLUSES: ICD-10-CM

## 2023-07-05 DIAGNOSIS — L90.9 PLANTAR FAT PAD ATROPHY: ICD-10-CM

## 2023-07-05 PROCEDURE — 99999 PR PBB SHADOW E&M-EST. PATIENT-LVL III: ICD-10-PCS | Mod: PBBFAC,,, | Performed by: PODIATRIST

## 2023-07-05 PROCEDURE — 1159F MED LIST DOCD IN RCRD: CPT | Mod: CPTII,S$GLB,, | Performed by: PODIATRIST

## 2023-07-05 PROCEDURE — 3288F PR FALLS RISK ASSESSMENT DOCUMENTED: ICD-10-PCS | Mod: CPTII,S$GLB,, | Performed by: PODIATRIST

## 2023-07-05 PROCEDURE — 1159F PR MEDICATION LIST DOCUMENTED IN MEDICAL RECORD: ICD-10-PCS | Mod: CPTII,S$GLB,, | Performed by: PODIATRIST

## 2023-07-05 PROCEDURE — 1126F AMNT PAIN NOTED NONE PRSNT: CPT | Mod: CPTII,S$GLB,, | Performed by: PODIATRIST

## 2023-07-05 PROCEDURE — 1160F PR REVIEW ALL MEDS BY PRESCRIBER/CLIN PHARMACIST DOCUMENTED: ICD-10-PCS | Mod: CPTII,S$GLB,, | Performed by: PODIATRIST

## 2023-07-05 PROCEDURE — 1160F RVW MEDS BY RX/DR IN RCRD: CPT | Mod: CPTII,S$GLB,, | Performed by: PODIATRIST

## 2023-07-05 PROCEDURE — 99999 PR PBB SHADOW E&M-EST. PATIENT-LVL III: CPT | Mod: PBBFAC,,, | Performed by: PODIATRIST

## 2023-07-05 PROCEDURE — 99213 PR OFFICE/OUTPT VISIT, EST, LEVL III, 20-29 MIN: ICD-10-PCS | Mod: S$GLB,,, | Performed by: PODIATRIST

## 2023-07-05 PROCEDURE — 1101F PT FALLS ASSESS-DOCD LE1/YR: CPT | Mod: CPTII,S$GLB,, | Performed by: PODIATRIST

## 2023-07-05 PROCEDURE — 3288F FALL RISK ASSESSMENT DOCD: CPT | Mod: CPTII,S$GLB,, | Performed by: PODIATRIST

## 2023-07-05 PROCEDURE — 1101F PR PT FALLS ASSESS DOC 0-1 FALLS W/OUT INJ PAST YR: ICD-10-PCS | Mod: CPTII,S$GLB,, | Performed by: PODIATRIST

## 2023-07-05 PROCEDURE — 1126F PR PAIN SEVERITY QUANTIFIED, NO PAIN PRESENT: ICD-10-PCS | Mod: CPTII,S$GLB,, | Performed by: PODIATRIST

## 2023-07-05 PROCEDURE — 99213 OFFICE O/P EST LOW 20 MIN: CPT | Mod: S$GLB,,, | Performed by: PODIATRIST

## 2023-07-06 NOTE — PROGRESS NOTES
Subjective:      Patient ID: Avni Daniels is a 78 y.o. male.    Chief Complaint: Diabetes Mellitus (Reyes Gusman MD at 04/06/2023 ) and Nail Care    Avni is a 78 y.o. male who presents to the clinic for evaluation and treatment of high risk feet. Avni has a past medical history of Diabetes mellitus, type 2 and Hypertension.  Complains of painful left foot callus and elongated, thickened toenails aggravated by increased weight bearing, shoe gear, pressure.   This patient has documented high risk feet requiring routine maintenance secondary to diabetes mellitis and those secondary complications of diabetes, as mentioned..      2/28/23: Reports pain left foot ulceration, reports cleansing area with peroxide.     3/8/2023 patient discontinues used of peroxide and has attempted to care for foot as instructed    3/16/2023   Patient has left football dressing clean, dry, intact.  Patient denies pain. No new pedal complaints.     3/22/2023   Patient has left football dressing clean, dry, intact.  Patient denies pain. No new pedal complaints.     4/12/2023   Patient returns to clinic for follow up of left foot wound.  Since our las encounter patient was hospitalized.  Presents to clinic with slid on cushion and tennis shoes.  Patient denies pain. No new pedal complaints.     05/23/2023   Patient returns to clinic for follow up of left foot wound. He has been caring for feet as instructed.Patient denies pain. No new pedal complaints.     07/05/2023   Patient returns to clinic for follow up of left foot wound. He has been caring for feet as instructed. Patient denies pain. No new pedal complaints.     PCP: Reyes Gusman MD    Date Last Seen by PCP:   Chief Complaint   Patient presents with    Diabetes Mellitus     Reyes Gusman MD at 04/06/2023     Nail Care       Hemoglobin A1C   Date Value Ref Range Status   03/29/2023 7.7 (H) <5.7 % Final     Hemoglobin A1c   Date Value Ref Range Status   07/07/2020 8.3 (H)  <5.7 % of total Hgb Final     Comment:     For someone without known diabetes, a hemoglobin A1c  value of 6.5% or greater indicates that they may have   diabetes and this should be confirmed with a follow-up   test.    For someone with known diabetes, a value <7% indicates   that their diabetes is well controlled and a value   greater than or equal to 7% indicates suboptimal   control. A1c targets should be individualized based on   duration of diabetes, age, comorbid conditions, and   other considerations.    Currently, no consensus exists regarding use of  hemoglobin A1c for diagnosis of diabetes for children.       03/05/2020 7.9 (H) <5.7 % of total Hgb Final     Comment:     For someone without known diabetes, a hemoglobin A1c  value of 6.5% or greater indicates that they may have   diabetes and this should be confirmed with a follow-up   test.    For someone with known diabetes, a value <7% indicates   that their diabetes is well controlled and a value   greater than or equal to 7% indicates suboptimal   control. A1c targets should be individualized based on   duration of diabetes, age, comorbid conditions, and   other considerations.    Currently, no consensus exists regarding use of  hemoglobin A1c for diagnosis of diabetes for children.               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      atorvastatin (LIPITOR) 40 MG tablet       clopidogrel (PLAVIX) 75 mg tablet Take 75 mg by mouth once.       doxycycline (VIBRAMYCIN) 100 MG Cap Take 1 capsule (100 mg total) by mouth every 12 (twelve) hours. 20 capsule 0    enalapril (VASOTEC) 5 MG tablet Take 5 mg by mouth once daily.       insulin NPH-insulin regular,  "70/30, (NOVOLIN 70/30) 100 unit/mL (70-30) injection Inject 25 Units into the skin 2 (two) times daily.       insulin syringe-needle U-100 0.5 mL 31 gauge x 5/16 Syrg       insulin syringe-needle U-100 1/2 mL 30 gauge x 5/16 Syrg       linezolid (ZYVOX) 600 mg Tab Take 1 tablet (600 mg total) by mouth every 12 (twelve) hours. 20 tablet 0    metoprolol succinate (TOPROL-XL) 25 MG 24 hr tablet Take 25 mg by mouth once daily.      metoprolol tartrate (LOPRESSOR) 25 MG tablet Take 25 mg by mouth Daily.      PACERONE 100 mg Tab Take 100 mg by mouth once daily.       THINPRO INSULIN SYRINGE 0.5 mL 31 x 3/8" Syrg       VITAMIN D2 1,250 mcg (50,000 unit) capsule TAKE 1 CAPSULE BY MOUTH ONCE EVERY MONTH       No current facility-administered medications on file prior to visit.       Review of patient's allergies indicates:  No Known Allergies    Past Surgical History:   Procedure Laterality Date    APPENDECTOMY      GALLBLADDER SURGERY      TUMOR REMOVAL Left     foot       History reviewed. No pertinent family history.    Social History     Socioeconomic History    Marital status:    Tobacco Use    Smoking status: Former    Smokeless tobacco: Former         Review of Systems   Constitution: Negative for chills and fever.   Cardiovascular: Negative for claudication and leg swelling.   Respiratory: Negative for cough and shortness of breath.    Skin: Positive for dry skin, nail changes and poor wound healing (hx of left foot ulcer). Negative for itching and rash.   Musculoskeletal: Positive for joint pain. Negative for falls, joint swelling and muscle weakness.   Gastrointestinal: Negative for diarrhea, nausea and vomiting.   Neurological: Positive for numbness and paresthesias. Negative for tremors and weakness.   Psychiatric/Behavioral: Negative for altered mental status and hallucinations.           Objective:       Vitals:    07/05/23 1101   Weight: 67.6 kg (149 lb)   Height: 5' 11" (1.803 m)   PainSc: 0-No pain "         Physical Exam   Constitutional:  Non-toxic appearance. He does not have a sickly appearance. No distress.   Pt. is well-developed, well-nourished, appears stated age, in no acute distress, alert and oriented x 3. No evidence of depression, anxiety, or agitation. Calm, cooperative, and communicative. Appropriate interactions and affect.   Cardiovascular:   Pulses:       Dorsalis pedis pulses are 1+ on the right side, and 1+ on the left side.        Posterior tibial pulses are 1+ on the right side, and 1+ on the left side.   Dorsalis pedis and posterior tibial pulses are diminished Bilaterally. Toes are cool to touch. Feet are warm proximally.There is decreased digital hair. Skin is atrophic   Pulmonary/Chest: No respiratory distress.   Musculoskeletal:        Right ankle: No tenderness. No lateral malleolus, no medial malleolus, no AITFL, no CF ligament and no posterior TFL tenderness found. Achilles tendon exhibits no pain, no defect and normal Elias's test results.        Left ankle: No tenderness. No lateral malleolus, no medial malleolus, no AITFL, no CF ligament and no posterior TFL tenderness found. Achilles tendon exhibits no pain, no defect and normal Elias's test results.        Right foot: There is no tenderness and no bony tenderness.        Left foot: There is no tenderness and no bony tenderness.     Decreased stride, station of gait.  apropulsive toe off.  Increased angle and base of gait.    Patient has hammertoes of digits 2-5 bilateral partially reducible without symptom today.    Visible and palpable bunion without pain at dorsomedial 1st metatarsal head right and left.  Hallux abducted right and left partially reducible, tracks laterally without being track bound.  No ecchymosis, erythema, edema, or cardinal signs infection or signs of trauma same foot.    Visible and palpable tailors bunion at dorsolateral 5th metatarsal head right and left.  5th digit is varus right and left  partially reducible. No ecchymosis, erythema, edema, or cardinal signs infection.    Fat pad atrophy to heels and met heads bilateral     Lymphadenopathy:   No lymphatic streaking    Negative lymphadenopathy bilateral popliteal fossa and tarsal tunnel.     Neurological:   [place]-Tamanna 5.07 monofilamant testing is diminished Ryan feet. Decreased/absent vibratory sensation bilateral feet to 128Hz tuning fork.     Paresthesias, and hyperesthesia bilateral feet with no clearly identified trigger or source.    Skin: Skin is warm and dry. No abrasion, no burn, no ecchymosis, no laceration, no petechiae and no rash noted. He is not diaphoretic. No cyanosis. No pallor. Nails show no clubbing.   Skin is thin, atrophic, xerotic    Toenails 1-5 bilaterally discolored/yellowed, dystrophic, brittle with subungual debris.    Psychiatric: His mood appears not anxious. His affect is not inappropriate. His speech is not slurred. He is not combative. He is communicative. He is attentive.   Nursing note reviewed.    7/5/2023        5/24/2023            4/12/23          3/22/23            3/16/23          3/8/23    Photo did not load  Scant purulence and depth noted    2/28/23: Post debridement            Assessment:       Encounter Diagnoses   Name Primary?    Type II diabetes mellitus with neurological manifestations Yes    Pre-ulcerative calluses     Plantar fat pad atrophy                  Plan:       Avni was seen today for diabetes mellitus and nail care.    Diagnoses and all orders for this visit:    Type II diabetes mellitus with neurological manifestations    Pre-ulcerative calluses    Plantar fat pad atrophy              I counseled the patient on his conditions, their implications and medical management.    Shoe inspection. Diabetic Foot Education. Patient reminded of the importance of good nutrition and blood sugar control to help prevent podiatric complications of diabetes. Patient instructed on proper foot hygeine. We  discussed wearing proper shoe gear, daily foot inspections, never walking without protective shoe gear, never putting sharp instruments to feet.      Wound has remained healed but with callus formation.  Skin is still delicate therefore patient must be diligent in avoiding excessive pressure and making sure there is adequate support and padding in shoe gear.     Follow-up: Patient is to return to the clinic in 8-12 weeks for follow-up but should call [facility] immediately if any signs of infection, such as fever, chills, sweats, increased redness or pain.    Short-term goals include maintaining good offloading and minimizing bioburden, promoting granulation and epithelialization to healing.  Long-term goals include keeping the wound healed by good offloading and medical management under the direction of internist.

## 2023-09-13 ENCOUNTER — OFFICE VISIT (OUTPATIENT)
Dept: PODIATRY | Facility: CLINIC | Age: 78
End: 2023-09-13
Payer: MEDICARE

## 2023-09-13 VITALS — WEIGHT: 149.06 LBS | HEIGHT: 71 IN | BODY MASS INDEX: 20.87 KG/M2

## 2023-09-13 DIAGNOSIS — L90.9 PLANTAR FAT PAD ATROPHY: ICD-10-CM

## 2023-09-13 DIAGNOSIS — M20.12 HALLUX ABDUCTO VALGUS, LEFT: ICD-10-CM

## 2023-09-13 DIAGNOSIS — M20.41 HAMMER TOES OF BOTH FEET: ICD-10-CM

## 2023-09-13 DIAGNOSIS — L84 PRE-ULCERATIVE CALLUSES: ICD-10-CM

## 2023-09-13 DIAGNOSIS — E11.49 TYPE II DIABETES MELLITUS WITH NEUROLOGICAL MANIFESTATIONS: Primary | ICD-10-CM

## 2023-09-13 DIAGNOSIS — B35.1 ONYCHOMYCOSIS DUE TO DERMATOPHYTE: ICD-10-CM

## 2023-09-13 DIAGNOSIS — L84 CORN OR CALLUS: ICD-10-CM

## 2023-09-13 DIAGNOSIS — M20.11 HALLUX ABDUCTO VALGUS, RIGHT: ICD-10-CM

## 2023-09-13 DIAGNOSIS — M20.42 HAMMER TOES OF BOTH FEET: ICD-10-CM

## 2023-09-13 PROCEDURE — 1101F PT FALLS ASSESS-DOCD LE1/YR: CPT | Mod: CPTII,S$GLB,, | Performed by: PODIATRIST

## 2023-09-13 PROCEDURE — 1159F MED LIST DOCD IN RCRD: CPT | Mod: CPTII,S$GLB,, | Performed by: PODIATRIST

## 2023-09-13 PROCEDURE — 1160F PR REVIEW ALL MEDS BY PRESCRIBER/CLIN PHARMACIST DOCUMENTED: ICD-10-PCS | Mod: CPTII,S$GLB,, | Performed by: PODIATRIST

## 2023-09-13 PROCEDURE — 3288F FALL RISK ASSESSMENT DOCD: CPT | Mod: CPTII,S$GLB,, | Performed by: PODIATRIST

## 2023-09-13 PROCEDURE — 99213 OFFICE O/P EST LOW 20 MIN: CPT | Mod: S$GLB,,, | Performed by: PODIATRIST

## 2023-09-13 PROCEDURE — 1160F RVW MEDS BY RX/DR IN RCRD: CPT | Mod: CPTII,S$GLB,, | Performed by: PODIATRIST

## 2023-09-13 PROCEDURE — 3288F PR FALLS RISK ASSESSMENT DOCUMENTED: ICD-10-PCS | Mod: CPTII,S$GLB,, | Performed by: PODIATRIST

## 2023-09-13 PROCEDURE — 99999 PR PBB SHADOW E&M-EST. PATIENT-LVL IV: ICD-10-PCS | Mod: PBBFAC,,, | Performed by: PODIATRIST

## 2023-09-13 PROCEDURE — 1159F PR MEDICATION LIST DOCUMENTED IN MEDICAL RECORD: ICD-10-PCS | Mod: CPTII,S$GLB,, | Performed by: PODIATRIST

## 2023-09-13 PROCEDURE — 1126F PR PAIN SEVERITY QUANTIFIED, NO PAIN PRESENT: ICD-10-PCS | Mod: CPTII,S$GLB,, | Performed by: PODIATRIST

## 2023-09-13 PROCEDURE — 1126F AMNT PAIN NOTED NONE PRSNT: CPT | Mod: CPTII,S$GLB,, | Performed by: PODIATRIST

## 2023-09-13 PROCEDURE — 99213 PR OFFICE/OUTPT VISIT, EST, LEVL III, 20-29 MIN: ICD-10-PCS | Mod: S$GLB,,, | Performed by: PODIATRIST

## 2023-09-13 PROCEDURE — 1101F PR PT FALLS ASSESS DOC 0-1 FALLS W/OUT INJ PAST YR: ICD-10-PCS | Mod: CPTII,S$GLB,, | Performed by: PODIATRIST

## 2023-09-13 PROCEDURE — 99999 PR PBB SHADOW E&M-EST. PATIENT-LVL IV: CPT | Mod: PBBFAC,,, | Performed by: PODIATRIST

## 2023-09-13 NOTE — PROGRESS NOTES
Subjective:      Patient ID: Avni Daniels is a 78 y.o. male.    Chief Complaint: Diabetes Mellitus (1/31/2023 Dr. Ng) and Nail Care    Avni is a 78 y.o. male who presents to the clinic for evaluation and treatment of high risk feet. Avni has a past medical history of Diabetes mellitus, type 2 and Hypertension.  Complains of painful left foot callus and elongated, thickened toenails aggravated by increased weight bearing, shoe gear, pressure.   This patient has documented high risk feet requiring routine maintenance secondary to diabetes mellitis and those secondary complications of diabetes, as mentioned..      2/28/23: Reports pain left foot ulceration, reports cleansing area with peroxide.     3/8/2023 patient discontinues used of peroxide and has attempted to care for foot as instructed    3/16/2023   Patient has left football dressing clean, dry, intact.  Patient denies pain. No new pedal complaints.     3/22/2023   Patient has left football dressing clean, dry, intact.  Patient denies pain. No new pedal complaints.     4/12/2023   Patient returns to clinic for follow up of left foot wound.  Since our las encounter patient was hospitalized.  Presents to clinic with slid on cushion and tennis shoes.  Patient denies pain. No new pedal complaints.     05/23/2023   Patient returns to clinic for follow up of left foot wound. He has been caring for feet as instructed.Patient denies pain. No new pedal complaints.     07/05/2023   Patient returns to clinic for follow up of left foot wound. He has been caring for feet as instructed. Patient denies pain. No new pedal complaints.     09/13/2023 patient returns to clinic for follow-up of high-risk feet with history of left foot wound.  Patient relates he has been using U pads regularly but states that they have been wearing down.  Patient relates he recently started doing a walking regimen.  Patient relates he believes his time emptying get new prescription  shoes.  Patient relates that he is not been painting the site with Betadine.  He relates some increased callus formation sub 1st metatarsophalangeal joint of the left foot but denies seeing any drainage.    PCP: Reyes Gusman MD    Date Last Seen by PCP:   Chief Complaint   Patient presents with    Diabetes Mellitus     1/31/2023 Dr. Ng    Nail Care       Hemoglobin A1C   Date Value Ref Range Status   03/29/2023 7.7 (H) <5.7 % Final     Hemoglobin A1c   Date Value Ref Range Status   07/07/2020 8.3 (H) <5.7 % of total Hgb Final     Comment:     For someone without known diabetes, a hemoglobin A1c  value of 6.5% or greater indicates that they may have   diabetes and this should be confirmed with a follow-up   test.    For someone with known diabetes, a value <7% indicates   that their diabetes is well controlled and a value   greater than or equal to 7% indicates suboptimal   control. A1c targets should be individualized based on   duration of diabetes, age, comorbid conditions, and   other considerations.    Currently, no consensus exists regarding use of  hemoglobin A1c for diagnosis of diabetes for children.       03/05/2020 7.9 (H) <5.7 % of total Hgb Final     Comment:     For someone without known diabetes, a hemoglobin A1c  value of 6.5% or greater indicates that they may have   diabetes and this should be confirmed with a follow-up   test.    For someone with known diabetes, a value <7% indicates   that their diabetes is well controlled and a value   greater than or equal to 7% indicates suboptimal   control. A1c targets should be individualized based on   duration of diabetes, age, comorbid conditions, and   other considerations.    Currently, no consensus exists regarding use of  hemoglobin A1c for diagnosis of diabetes for children.               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      atorvastatin (LIPITOR) 40 MG tablet       clopidogrel (PLAVIX) 75 mg tablet Take 75 mg by mouth once.       doxycycline (VIBRAMYCIN) 100 MG Cap Take 1 capsule (100 mg total) by mouth every 12 (twelve) hours. 20 capsule 0    enalapril (VASOTEC) 5 MG tablet Take 5 mg by mouth once daily.       insulin NPH-insulin regular, 70/30, (NOVOLIN 70/30) 100 unit/mL (70-30) injection Inject 25 Units into the skin 2 (two) times daily.       insulin syringe-needle U-100 0.5 mL 31 gauge x 5/16 Syrg       insulin syringe-needle U-100 1/2 mL 30 gauge x 5/16 Syrg       linezolid (ZYVOX) 600 mg Tab Take 1 tablet (600 mg total) by mouth every 12 (twelve) hours. 20 tablet 0    metoprolol succinate (TOPROL-XL) 25 MG 24 hr tablet Take 25 mg by mouth once daily.      metoprolol tartrate (LOPRESSOR) 25 MG tablet Take 25 mg by mouth Daily.      PACERONE 100 mg Tab Take 100 mg by mouth once daily.       THINPRO INSULIN SYRINGE 0.5 mL 31 x 3/8" Syrg       VITAMIN D2 1,250 mcg (50,000 unit) capsule TAKE 1 CAPSULE BY MOUTH ONCE EVERY MONTH       No current facility-administered medications on file prior to visit.       Review of patient's allergies indicates:  No Known Allergies    Past Surgical History:   Procedure Laterality Date    APPENDECTOMY      GALLBLADDER SURGERY      TUMOR REMOVAL Left     foot       History reviewed. No pertinent family history.    Social History     Socioeconomic History    Marital status:    Tobacco Use    Smoking status: Former    Smokeless tobacco: Former         Review of Systems   Constitution: Negative for chills and fever.   Cardiovascular: Negative for claudication and leg swelling.   Respiratory: Negative for cough and shortness of breath.    Skin: Positive for dry skin, nail changes and poor wound healing (hx " "of left foot ulcer). Negative for itching and rash.   Musculoskeletal: Positive for joint pain. Negative for falls, joint swelling and muscle weakness.   Gastrointestinal: Negative for diarrhea, nausea and vomiting.   Neurological: Positive for numbness and paresthesias. Negative for tremors and weakness.   Psychiatric/Behavioral: Negative for altered mental status and hallucinations.           Objective:       Vitals:    09/13/23 1113   Weight: 67.6 kg (149 lb 0.5 oz)   Height: 5' 11" (1.803 m)   PainSc: 0-No pain         Physical Exam   Constitutional:  Non-toxic appearance. He does not have a sickly appearance. No distress.   Pt. is well-developed, well-nourished, appears stated age, in no acute distress, alert and oriented x 3. No evidence of depression, anxiety, or agitation. Calm, cooperative, and communicative. Appropriate interactions and affect.   Cardiovascular:   Pulses:       Dorsalis pedis pulses are 1+ on the right side, and 1+ on the left side.        Posterior tibial pulses are 1+ on the right side, and 1+ on the left side.   Dorsalis pedis and posterior tibial pulses are diminished Bilaterally. Toes are cool to touch. Feet are warm proximally.There is decreased digital hair. Skin is atrophic   Pulmonary/Chest: No respiratory distress.   Musculoskeletal:        Right ankle: No tenderness. No lateral malleolus, no medial malleolus, no AITFL, no CF ligament and no posterior TFL tenderness found. Achilles tendon exhibits no pain, no defect and normal Elias's test results.        Left ankle: No tenderness. No lateral malleolus, no medial malleolus, no AITFL, no CF ligament and no posterior TFL tenderness found. Achilles tendon exhibits no pain, no defect and normal Elias's test results.        Right foot: There is no tenderness and no bony tenderness.        Left foot: There is no tenderness and no bony tenderness.     Decreased stride, station of gait.  apropulsive toe off.  Increased angle and " base of gait.    Patient has hammertoes of digits 2-5 bilateral partially reducible without symptom today.    Visible and palpable bunion without pain at dorsomedial 1st metatarsal head right and left.  Hallux abducted right and left partially reducible, tracks laterally without being track bound.  No ecchymosis, erythema, edema, or cardinal signs infection or signs of trauma same foot.    Visible and palpable tailors bunion at dorsolateral 5th metatarsal head right and left.  5th digit is varus right and left partially reducible. No ecchymosis, erythema, edema, or cardinal signs infection.    Fat pad atrophy to heels and met heads bilateral     Lymphadenopathy:   No lymphatic streaking    Negative lymphadenopathy bilateral popliteal fossa and tarsal tunnel.     Neurological:   [place] 5.07 monofilamant testing is diminished Ryan feet. Decreased/absent vibratory sensation bilateral feet to 128Hz tuning fork.     Paresthesias, and hyperesthesia bilateral feet with no clearly identified trigger or source.    Skin: Skin is warm and dry. No abrasion, no burn, no ecchymosis, no laceration, no petechiae and no rash noted. He is not diaphoretic. No cyanosis. No pallor. Nails show no clubbing.   Skin is thin, atrophic, xerotic    Toenails 1-5 bilaterally discolored/yellowed, dystrophic, brittle with subungual debris.    Psychiatric: His mood appears not anxious. His affect is not inappropriate. His speech is not slurred. He is not combative. He is communicative. He is attentive.   Nursing note reviewed.    09/13/2023          7/5/2023        5/24/2023            4/12/23          3/22/23            3/16/23          3/8/23    Photo did not load  Scant purulence and depth noted    2/28/23: Post debridement            Assessment:       Encounter Diagnoses   Name Primary?    Type II diabetes mellitus with neurological manifestations Yes    Pre-ulcerative calluses     Plantar fat pad atrophy     Corn or callus      Onychomycosis due to dermatophyte     Hammer toes of both feet     Hallux abducto valgus, left     Hallux abducto valgus, right                  Plan:       Avni was seen today for diabetes mellitus and nail care.    Diagnoses and all orders for this visit:    Type II diabetes mellitus with neurological manifestations  -     DIABETIC SHOES FOR HOME USE    Pre-ulcerative calluses  -     DIABETIC SHOES FOR HOME USE    Plantar fat pad atrophy  -     DIABETIC SHOES FOR HOME USE    Corn or callus    Onychomycosis due to dermatophyte    Hammer toes of both feet  -     DIABETIC SHOES FOR HOME USE    Hallux abducto valgus, left  -     DIABETIC SHOES FOR HOME USE    Hallux abducto valgus, right  -     DIABETIC SHOES FOR HOME USE            I counseled the patient on his conditions, their implications and medical management.    Shoe inspection. Diabetic Foot Education. Patient reminded of the importance of good nutrition and blood sugar control to help prevent podiatric complications of diabetes. Patient instructed on proper foot hygeine. We discussed wearing proper shoe gear, daily foot inspections, never walking without protective shoe gear, never putting sharp instruments to feet.      Wound has remained healed but with callus formation.  Skin is still delicate therefore patient must be diligent in avoiding excessive pressure and making sure there is adequate support and padding in shoe gear. Rx diabetic shoes for protection and support    Follow-up: Patient is to return to the clinic in 8-12 weeks for follow-up but should call [facility] immediately if any signs of infection, such as fever, chills, sweats, increased redness or pain.    Short-term goals include maintaining good offloading and minimizing bioburden, promoting granulation and epithelialization to healing.  Long-term goals include keeping the wound healed by good offloading and medical management under the direction of internist.

## 2023-09-13 NOTE — PATIENT INSTRUCTIONS
Wound has healed well with no signs of continued skin breakdown noted however skin is still thin and vulnerable   Transition into supportive shoe gear at this time. No barefeet or socks only, no slide on or flat shoes  Check feet daily for signs of drainage or lesion re-opening   Use of daily foot moisturizer to feet, avoiding the webspace's  Apply drying agent such as betadine or gentian violet to healed wound site  Avoid soaking in any water for the first three weeks, then limit showers/bathing to roughly 15 minutes to prevent skin breakdown, try to keep wound dry during this time     Over the counter pain creams: Voltaren Gel, Biofreeze, Bengay, tiger balm, two old goat, lidocaine gel,  Absorbine Veterinary Liniment Gel Topical Analgesic Sore Muscle and Joint Pain Relief  Recommend lotions: eucerin, eucerin for diabetics, aquaphor, A&D ointment, gold bond for diabetics, sween, [place]'s Bees all purpose baby ointment,  urea 40 with aloe or SkinIntegra rapid crack repair (found on amazon.com)  Shoe recommendations: (try 6pm.com, zappos.[employer] , [employer], or shoes.[employer] for discounted prices) you can visit varsity shoes in [place], DSW shoes in [place]  or [facility] in the [place] (there are also several shoe brand outlets in the [place])  ONLY purchase stability style tennis shoes NO flex, foam, free, yoga mat style shoes  Asics (GT 4000 or gel foundations), new balance stability type shoes (such as the 940 series), gino (stabil c3),  Drew (GTS or Beast or transcend), propet, HokaOne (tennis shoe) Champ (tennis shoes and boots)  Sofdani Knox (women) Donna&Mich (men), clarks, crolynda, aerosoles, naturalizers, SAS, ecco, born, ant good, rockports (dress shoes)  Vionic, burkenstocks, fitflops, propet, taos, baretraps (sandals)  HokaOne sandals, crocs, propet (house shoes)    Nail Home remedy:  Vicks Vapor rub or Emuaid to nails for easier manageability    Diabetes: Inspecting Your  Feet  Diabetes increases your chances of developing foot problems. So inspect your feet every day. This helps you find small skin irritations before they become serious infections. If you have trouble seeing the bottoms of your feet, use a mirror or ask a family member or friend to help.     Pressure spots on the bottom of the foot are common areas where problems develop.   How to check your feet  Below are tips to help you look for foot problems. Try to check your feet at the same time each day, such as when you get out of bed in the morning:  Check the top of each foot. The tops of toes, back of the heel, and outer edge of the foot can get a lot of rubbing from poor-fitting shoes.  Check the bottom of each foot. Daily wear and tear often leads to problems at pressure spots.  Check the toes and nails. Fungal infections often occur between toes. Toenail problems can also be a sign of fungal infections or lead to breaks in the skin.  Check your shoes, too. Loose objects inside a shoe can injure the foot. Use your hand to feel inside your shoes for things like robert, loose stitching, or rough areas that could irritate your skin.  Warning signs  Look for any color changes in the foot. Redness with streaks can signal a severe infection, which needs immediate medical attention. Tell your doctor right away if you have any of these problems:  Swelling, sometimes with color changes, may be a sign of poor blood flow or infection. Symptoms include tenderness and an increase in the size of your foot.  Warm or hot areas on your feet may be signs of infection. A foot that is cold may not be getting enough blood.  Sensations such as burning, tingling, or pins and needles can be signs of a problem. Also check for areas that may be numb.  Hot spots are caused by friction or pressure. Look for hot spots in areas that get a lot of rubbing. Hot spots can turn into blisters, calluses, or sores.  Cracks and sores are caused by dry  or irritated skin. They are a sign that the skin is breaking down, which can lead to infection.  Toenail problems to watch for include nails growing into the skin (ingrown toenail) and causing redness or pain. Thick, yellow, or discolored nails can signal a fungal infection.  Drainage and odor can develop from untreated sores and ulcers. Call your doctor right away if you notice white or yellow drainage, bleeding, or unpleasant odor.   © 9194-0495 [employer]. [address] [place] [address]. All rights reserved. This information is not intended as a substitute for professional medical care. Always follow your healthcare professional's instructions.        Step-by-Step:  Inspecting Your Feet (Diabetes)    Date Last Reviewed: 10/1/2016  © 3245-6967 [employer]. [address] [place] [address]. All rights reserved. This information is not intended as a substitute for professional medical care. Always follow your healthcare professional's instructions.

## 2023-12-04 NOTE — PROGRESS NOTES
Subjective:      Patient ID: Avni Daniels is a 78 y.o. male.    Chief Complaint: Diabetes Mellitus (10/2/23 Dr Gusman) and Nail Care    Avni is a 78 y.o. male who presents to the clinic for evaluation and treatment of high risk feet. Avni has a past medical history of Diabetes mellitus, type 2 and Hypertension.  Complains of painful left foot callus and elongated, thickened toenails aggravated by increased weight bearing, shoe gear, pressure.   This patient has documented high risk feet requiring routine maintenance secondary to diabetes mellitis and those secondary complications of diabetes, as mentioned..      2/28/23: Reports pain left foot ulceration, reports cleansing area with peroxide.     3/8/2023 patient discontinues used of peroxide and has attempted to care for foot as instructed    3/16/2023   Patient has left football dressing clean, dry, intact.  Patient denies pain. No new pedal complaints.     3/22/2023   Patient has left football dressing clean, dry, intact.  Patient denies pain. No new pedal complaints.     4/12/2023   Patient returns to clinic for follow up of left foot wound.  Since our las encounter patient was hospitalized.  Presents to clinic with slid on cushion and tennis shoes.  Patient denies pain. No new pedal complaints.     05/23/2023   Patient returns to clinic for follow up of left foot wound. He has been caring for feet as instructed.Patient denies pain. No new pedal complaints.     07/05/2023   Patient returns to clinic for follow up of left foot wound. He has been caring for feet as instructed. Patient denies pain. No new pedal complaints.     09/13/2023 patient returns to clinic for follow-up of high-risk feet with history of left foot wound.  Patient relates he has been using U pads regularly but states that they have been wearing down.  Patient relates he recently started doing a walking regimen.  Patient relates he believes his time emptying get new prescription shoes.   Patient relates that he is not been painting the site with Betadine.  He relates some increased callus formation sub 1st metatarsophalangeal joint of the left foot but denies seeing any drainage.    12/05/2023 patient returns to clinic for follow-up of high-risk feet with history of left foot wound.  Patient relates that while on his way to work the [employer] football game this weekend, his feet became wet from standing rain water.  He admits he did not change his shoes nor socks.  Once home he noted a color change to the previous wound site.  Patient relates that he began painting the site with Betadine. Denies seeing drainage.      PCP: Reyes Gusman MD    Date Last Seen by PCP:   Chief Complaint   Patient presents with    Diabetes Mellitus     10/2/23 Dr Gusman    [facility]       Hemoglobin A1C   Date Value Ref Range Status   10/02/2023 7.7 (H) <5.7 % Final   03/29/2023 7.7 (H) <5.7 % Final     Hemoglobin A1c   Date Value Ref Range Status   07/07/2020 8.3 (H) <5.7 % of total Hgb Final     Comment:     For someone without known diabetes, a hemoglobin A1c  value of 6.5% or greater indicates that they may have   diabetes and this should be confirmed with a follow-up   test.    For someone with known diabetes, a value <7% indicates   that their diabetes is well controlled and a value   greater than or equal to 7% indicates suboptimal   control. A1c targets should be individualized based on   duration of diabetes, age, comorbid conditions, and   other considerations.    Currently, no consensus exists regarding use of  hemoglobin A1c for diagnosis of diabetes for children.       03/05/2020 7.9 (H) <5.7 % of total Hgb Final     Comment:     For someone without known diabetes, a hemoglobin A1c  value of 6.5% or greater indicates that they may have   diabetes and this should be confirmed with a follow-up   test.    For someone with known diabetes, a value <7% indicates   that their diabetes is well controlled and a value  "  greater than or equal to 7% indicates suboptimal   control. A1c targets should be individualized based on   duration of diabetes, age, comorbid conditions, and   other considerations.    Currently, no consensus exists regarding use of  hemoglobin A1c for diagnosis of diabetes for children.               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      atorvastatin (LIPITOR) 40 MG tablet       clopidogrel (PLAVIX) 75 mg tablet Take 75 mg by mouth once.       enalapril (VASOTEC) 5 MG tablet Take 5 mg by mouth once daily.       insulin NPH-insulin regular, 70/30, (NOVOLIN 70/30) 100 unit/mL (70-30) injection Inject 25 Units into the skin 2 (two) times daily.       insulin syringe-needle U-100 0.5 mL 31 gauge x 5/16 Syrg       insulin syringe-needle U-100 1/2 mL 30 gauge x 5/16 Syrg       linezolid (ZYVOX) 600 mg Tab Take 1 tablet (600 mg total) by mouth every 12 (twelve) hours. 20 tablet 0    metoprolol succinate (TOPROL-XL) 25 MG 24 hr tablet Take 25 mg by mouth once daily.      metoprolol tartrate (LOPRESSOR) 25 MG tablet Take 25 mg by mouth Daily.      PACERONE 100 mg Tab Take 100 mg by mouth once daily.       THINPRO INSULIN SYRINGE 0.5 mL 31 x 3/8" Syrg       VITAMIN D2 1,250 mcg (50,000 unit) capsule TAKE 1 CAPSULE BY MOUTH ONCE EVERY MONTH       No current facility-administered medications on file prior to visit.       Review of patient's allergies indicates:  No Known Allergies    Past Surgical History:   Procedure Laterality Date    APPENDECTOMY      GALLBLADDER SURGERY      TUMOR REMOVAL Left     foot       History reviewed. No pertinent family history.    Social History     Socioeconomic History    Marital status: " "   Tobacco Use    Smoking status: Former    Smokeless tobacco: Former         Review of Systems   Constitution: Negative for chills and fever.   Cardiovascular: Negative for claudication and leg swelling.   Respiratory: Negative for cough and shortness of breath.    Skin: Positive for dry skin, nail changes and poor wound healing (hx of left foot ulcer). Negative for itching and rash.   Musculoskeletal: Positive for joint pain. Negative for falls, joint swelling and muscle weakness.   Gastrointestinal: Negative for diarrhea, nausea and vomiting.   Neurological: Positive for numbness and paresthesias. Negative for tremors and weakness.   Psychiatric/Behavioral: Negative for altered mental status and hallucinations.           Objective:       Vitals:    12/05/23 1127   BP: 125/70   Pulse: 85   Weight: 67.6 kg (149 lb 0.5 oz)   Height: 5' 11" (1.803 m)         Physical Exam   Constitutional:  Non-toxic appearance. He does not have a sickly appearance. No distress.   Pt. is well-developed, well-nourished, appears stated age, in no acute distress, alert and oriented x 3. No evidence of depression, anxiety, or agitation. Calm, cooperative, and communicative. Appropriate interactions and affect.   Cardiovascular:   Pulses:       Dorsalis pedis pulses are 1+ on the right side, and 1+ on the left side.        Posterior tibial pulses are 1+ on the right side, and 1+ on the left side.   Dorsalis pedis and posterior tibial pulses are diminished Bilaterally. Toes are cool to touch. Feet are warm proximally.There is decreased digital hair. Skin is atrophic   Pulmonary/Chest: No respiratory distress.   Musculoskeletal:        Right ankle: No tenderness. No lateral malleolus, no medial malleolus, no AITFL, no CF ligament and no posterior TFL tenderness found. Achilles tendon exhibits no pain, no defect and normal Elias's test results.        Left ankle: No tenderness. No lateral malleolus, no medial malleolus, no AITFL, " no CF ligament and no posterior TFL tenderness found. Achilles tendon exhibits no pain, no defect and normal Elias's test results.        Right foot: There is no tenderness and no bony tenderness.        Left foot: There is no tenderness and no bony tenderness.     Decreased stride, station of gait.  apropulsive toe off.  Increased angle and base of gait.    Patient has hammertoes of digits 2-5 bilateral partially reducible without symptom today.    Visible and palpable bunion without pain at dorsomedial 1st metatarsal head right and left.  Hallux abducted right and left partially reducible, tracks laterally without being track bound.  No ecchymosis, erythema, edema, or cardinal signs infection or signs of trauma same foot.    Visible and palpable tailors bunion at dorsolateral 5th metatarsal head right and left.  5th digit is varus right and left partially reducible. No ecchymosis, erythema, edema, or cardinal signs infection.    Fat pad atrophy to heels and met heads bilateral     Lymphadenopathy:   No lymphatic streaking    Negative lymphadenopathy bilateral popliteal fossa and tarsal tunnel.     Neurological:   Radford-Tamanna 5.07 monofilamant testing is diminished Ryan feet. Decreased/absent vibratory sensation bilateral feet to 128Hz tuning fork.     Paresthesias, and hyperesthesia bilateral feet with no clearly identified trigger or source.    Skin: Skin is warm and dry. He is not diaphoretic. No cyanosis. No pallor. Nails show no clubbing.   Skin is thin, atrophic, xerotic    Toenails 1-5 bilaterally discolored/yellowed, dystrophic, brittle with subungual debris.    See wound below    Psychiatric: His mood appears not anxious. His affect is not inappropriate. His speech is not slurred. He is not combative. He is communicative. He is attentive.   Nursing note reviewed.    12/05/2023        Post debridement    Ulcer location: sub 1st MTPJ of the left foot  Measurements : 1.2x0.6x0.2  cm   Signs of  infection: local edema, erythema  Drainage: Sero-Sanguinous  Purulence: No  Crepitus/fluctuance: No  Periwound: Reddened, Macerated, Calloused  Base: Mixed Granular/Fibrotic  Depth: subcutaneous tissue  Probe to bone: No          09/13/2023          7/5/2023        5/24/2023            4/12/23          3/22/23            3/16/23          3/8/23    Photo did not load  Scant purulence and depth noted    2/28/23: Post debridement            Assessment:       Encounter Diagnoses   Name Primary?    Type II diabetes mellitus with neurological manifestations Yes    Type 2 diabetes mellitus with left diabetic foot ulcer                  Plan:       Avni was seen today for diabetes mellitus and nail care.    Diagnoses and all orders for this visit:    Type II diabetes mellitus with neurological manifestations    Type 2 diabetes mellitus with left diabetic foot ulcer  -     Aerobic culture  -     Culture, Anaerobic  -     X-Ray Foot Complete Left; Future  -     Wound Debridement    Other orders  -     doxycycline (VIBRAMYCIN) 100 MG Cap; Take 1 capsule (100 mg total) by mouth every 12 (twelve) hours.        Education about the prevention of limb loss.    Discussed wound healing cycle, skin integrity, ways to care for skin.Counseled patient on the effects of biomechanical pressure and blood glucose on healing. He verbalizes understanding that it can increase the chances of delayed healing and this prolonged exposure leads to infection or progression of infection which subsequently can result in loss of limb.    Adequate vitamin supplementation, protein intake, and hydration - discussed with patient    Imaging ordered to rule out bone involvement or gas in the soft tissues.     Debridement: procedure note at end    Wound has reopened    The wound is cleansed of foreign material as much as possible and the base inspected for bone or abscess.  Base is granular and without bone nor joint exposure.  Aerobic and anerobic cultures  swabs taken.  Doxy ppx    Dressings: iodosorb  Offloading: football and darco shoe    Follow-up: 1 week but should call [facility] immediately if any signs of infection, such as fever, chills, sweats, increased redness or pain.    Short-term goals include maintaining good offloading and minimizing bioburden, promoting granulation and epithelialization to healing.  Long-term goals include keeping the wound healed by good offloading and medical management under the direction of internist.    Shoe inspection. Diabetic Foot Education. Patient reminded of the importance of good nutrition and blood sugar control to help prevent podiatric complications of diabetes. Patient instructed on proper foot hygeine. We discussed wearing proper shoe gear, daily foot inspections, never walking without protective shoe gear, never putting sharp instruments to feet.        Wound Debridement    Date/Time: 12/5/2023 11:30 AM    Performed by: Miryam Harrell DPM  Authorized by: Miryam Harrell DPM      Wound Details:    Location:  Left foot    Location:  Left 1st Metatarsal Head    Type of Debridement:  Excisional       Length (cm):  1.2       Area (sq cm):  0.72       Width (cm):  0.6       Percent Debrided (%):  100       Depth (cm):  0.2       Total Area Debrided (sq cm):  0.72    Depth of debridement:  Subcutaneous tissue    Tissue debrided:  Dermis, Epidermis and Subcutaneous    Devitalized tissue debrided:  Callus and Fibrin    Instruments:  Curette  Bleeding:  Minimal  Hemostasis Achieved: Yes  Method Used:  Pressure  Patient tolerance:  Patient tolerated the procedure well with no immediate complications

## 2023-12-05 ENCOUNTER — OFFICE VISIT (OUTPATIENT)
Dept: PODIATRY | Facility: CLINIC | Age: 78
End: 2023-12-05
Payer: MEDICARE

## 2023-12-05 VITALS
HEART RATE: 85 BPM | SYSTOLIC BLOOD PRESSURE: 125 MMHG | BODY MASS INDEX: 20.87 KG/M2 | HEIGHT: 71 IN | DIASTOLIC BLOOD PRESSURE: 70 MMHG | WEIGHT: 149.06 LBS

## 2023-12-05 DIAGNOSIS — E11.621 TYPE 2 DIABETES MELLITUS WITH LEFT DIABETIC FOOT ULCER: ICD-10-CM

## 2023-12-05 DIAGNOSIS — L97.529 TYPE 2 DIABETES MELLITUS WITH LEFT DIABETIC FOOT ULCER: ICD-10-CM

## 2023-12-05 DIAGNOSIS — E11.49 TYPE II DIABETES MELLITUS WITH NEUROLOGICAL MANIFESTATIONS: Primary | ICD-10-CM

## 2023-12-05 PROCEDURE — 1160F PR REVIEW ALL MEDS BY PRESCRIBER/CLIN PHARMACIST DOCUMENTED: ICD-10-PCS | Mod: CPTII,S$GLB,, | Performed by: PODIATRIST

## 2023-12-05 PROCEDURE — 1160F RVW MEDS BY RX/DR IN RCRD: CPT | Mod: CPTII,S$GLB,, | Performed by: PODIATRIST

## 2023-12-05 PROCEDURE — 3074F PR MOST RECENT SYSTOLIC BLOOD PRESSURE < 130 MM HG: ICD-10-PCS | Mod: CPTII,S$GLB,, | Performed by: PODIATRIST

## 2023-12-05 PROCEDURE — 99214 PR OFFICE/OUTPT VISIT, EST, LEVL IV, 30-39 MIN: ICD-10-PCS | Mod: 25,S$GLB,, | Performed by: PODIATRIST

## 2023-12-05 PROCEDURE — 1101F PR PT FALLS ASSESS DOC 0-1 FALLS W/OUT INJ PAST YR: ICD-10-PCS | Mod: CPTII,S$GLB,, | Performed by: PODIATRIST

## 2023-12-05 PROCEDURE — 1159F PR MEDICATION LIST DOCUMENTED IN MEDICAL RECORD: ICD-10-PCS | Mod: CPTII,S$GLB,, | Performed by: PODIATRIST

## 2023-12-05 PROCEDURE — 1101F PT FALLS ASSESS-DOCD LE1/YR: CPT | Mod: CPTII,S$GLB,, | Performed by: PODIATRIST

## 2023-12-05 PROCEDURE — 3288F PR FALLS RISK ASSESSMENT DOCUMENTED: ICD-10-PCS | Mod: CPTII,S$GLB,, | Performed by: PODIATRIST

## 2023-12-05 PROCEDURE — 11042 WOUND DEBRIDEMENT: ICD-10-PCS | Mod: S$GLB,,, | Performed by: PODIATRIST

## 2023-12-05 PROCEDURE — 3074F SYST BP LT 130 MM HG: CPT | Mod: CPTII,S$GLB,, | Performed by: PODIATRIST

## 2023-12-05 PROCEDURE — 1159F MED LIST DOCD IN RCRD: CPT | Mod: CPTII,S$GLB,, | Performed by: PODIATRIST

## 2023-12-05 PROCEDURE — 99999 PR PBB SHADOW E&M-EST. PATIENT-LVL IV: ICD-10-PCS | Mod: PBBFAC,,, | Performed by: PODIATRIST

## 2023-12-05 PROCEDURE — 3288F FALL RISK ASSESSMENT DOCD: CPT | Mod: CPTII,S$GLB,, | Performed by: PODIATRIST

## 2023-12-05 PROCEDURE — 99214 OFFICE O/P EST MOD 30 MIN: CPT | Mod: 25,S$GLB,, | Performed by: PODIATRIST

## 2023-12-05 PROCEDURE — 3078F DIAST BP <80 MM HG: CPT | Mod: CPTII,S$GLB,, | Performed by: PODIATRIST

## 2023-12-05 PROCEDURE — 3078F PR MOST RECENT DIASTOLIC BLOOD PRESSURE < 80 MM HG: ICD-10-PCS | Mod: CPTII,S$GLB,, | Performed by: PODIATRIST

## 2023-12-05 PROCEDURE — 87075 CULTR BACTERIA EXCEPT BLOOD: CPT | Performed by: PODIATRIST

## 2023-12-05 PROCEDURE — 87070 CULTURE OTHR SPECIMN AEROBIC: CPT | Performed by: PODIATRIST

## 2023-12-05 PROCEDURE — 99999 PR PBB SHADOW E&M-EST. PATIENT-LVL IV: CPT | Mod: PBBFAC,,, | Performed by: PODIATRIST

## 2023-12-05 PROCEDURE — 11042 DBRDMT SUBQ TIS 1ST 20SQCM/<: CPT | Mod: S$GLB,,, | Performed by: PODIATRIST

## 2023-12-05 RX ORDER — DOXYCYCLINE 100 MG/1
100 CAPSULE ORAL EVERY 12 HOURS
Qty: 20 CAPSULE | Refills: 0 | Status: SHIPPED | OUTPATIENT
Start: 2023-12-05

## 2023-12-05 NOTE — PATIENT INSTRUCTIONS
Please keep football dressing clean, dry, and intact.  If dressing gets wet please contact our office.    Wear special shoe every time foot is placed on the floor.    Elevate affected foot as much as possible    Stay hydrated.      Nutrition and MyPlate: Protein Foods  This group includes foods that are high in protein. Protein helps the body build new cells and keeps tissues healthy. Most Americans get enough protein without even trying. It can be harder for vegetarians, but plenty of non-meat foods are rich in protein, too. Its best to get protein from a variety of sources.    Nutrient-Rich Choices  Theres a lot more to this food group than just meat and beans. It also includes nuts, seeds, and eggs. There are all sorts of nutrient-rich choices:  Chicken and turkey with the skin removed  Fish and shellfish  Lean beef, pork, or lamb (without visible fat)  Soy products, such as tofu, soybeans (edamame), tempeh, or soymilk  Black beans, kidney beans, montoya beans, chickpeas (garbanzo beans), and lentils (Note: beans and peas count as both a protein and a vegetable)  Peanuts, almonds, walnuts, sesame seeds, and sunflower  seeds, as well as foods made from these (such as peanut butter or tahini)  Eggs and foods made with eggs (such as quiche or frittata)  What Makes Meat and Beans Less Healthy?  Fatty meat is not healthy. Before you cook meat, trim off all the fat you can see. Chicken and turkey skin is also high in fat, and should be removed before cooking.  Breading and frying make food less healthy. This includes dishes like fried chicken, fried fish, and refried beans.  Sausage and lunch meats tend to be high in fat and salt. Buy low-fat, low-sodium versions.  One Small Change  Make a meal that includes a non-meat source of protein (such as tofu, lentils, or any other food listed above). Have a better idea? Write it here:  _____________________________________________________________  © 2071-3629 [employer] [facility]  [employer] [place]. [address] [place] [address], [place] [address]. All rights reserved. This information is not intended as a substitute for professional medical care. Always follow your healthcare professional's instructions.

## 2023-12-07 LAB — BACTERIA SPEC AEROBE CULT: NORMAL

## 2023-12-11 LAB — BACTERIA SPEC ANAEROBE CULT: NORMAL

## 2023-12-14 ENCOUNTER — OFFICE VISIT (OUTPATIENT)
Dept: PODIATRY | Facility: CLINIC | Age: 78
End: 2023-12-14
Payer: MEDICARE

## 2023-12-14 VITALS
HEIGHT: 71 IN | BODY MASS INDEX: 20.87 KG/M2 | SYSTOLIC BLOOD PRESSURE: 147 MMHG | DIASTOLIC BLOOD PRESSURE: 73 MMHG | HEART RATE: 87 BPM | WEIGHT: 149.06 LBS

## 2023-12-14 DIAGNOSIS — E11.49 TYPE II DIABETES MELLITUS WITH NEUROLOGICAL MANIFESTATIONS: Primary | ICD-10-CM

## 2023-12-14 DIAGNOSIS — E11.621 TYPE 2 DIABETES MELLITUS WITH LEFT DIABETIC FOOT ULCER: ICD-10-CM

## 2023-12-14 DIAGNOSIS — L97.529 TYPE 2 DIABETES MELLITUS WITH LEFT DIABETIC FOOT ULCER: ICD-10-CM

## 2023-12-14 DIAGNOSIS — S90.425A BLISTER OF FIFTH TOE OF LEFT FOOT, INITIAL ENCOUNTER: ICD-10-CM

## 2023-12-14 DIAGNOSIS — S90.812A ABRASION, LEFT FOOT, INITIAL ENCOUNTER: ICD-10-CM

## 2023-12-14 PROCEDURE — 3288F FALL RISK ASSESSMENT DOCD: CPT | Mod: CPTII,S$GLB,, | Performed by: PODIATRIST

## 2023-12-14 PROCEDURE — 1125F PR PAIN SEVERITY QUANTIFIED, PAIN PRESENT: ICD-10-PCS | Mod: CPTII,S$GLB,, | Performed by: PODIATRIST

## 2023-12-14 PROCEDURE — 99999 PR PBB SHADOW E&M-EST. PATIENT-LVL V: CPT | Mod: PBBFAC,,, | Performed by: PODIATRIST

## 2023-12-14 PROCEDURE — 1160F PR REVIEW ALL MEDS BY PRESCRIBER/CLIN PHARMACIST DOCUMENTED: ICD-10-PCS | Mod: CPTII,S$GLB,, | Performed by: PODIATRIST

## 2023-12-14 PROCEDURE — 99214 PR OFFICE/OUTPT VISIT, EST, LEVL IV, 30-39 MIN: ICD-10-PCS | Mod: S$GLB,,, | Performed by: PODIATRIST

## 2023-12-14 PROCEDURE — 1125F AMNT PAIN NOTED PAIN PRSNT: CPT | Mod: CPTII,S$GLB,, | Performed by: PODIATRIST

## 2023-12-14 PROCEDURE — 1101F PR PT FALLS ASSESS DOC 0-1 FALLS W/OUT INJ PAST YR: ICD-10-PCS | Mod: CPTII,S$GLB,, | Performed by: PODIATRIST

## 2023-12-14 PROCEDURE — 1101F PT FALLS ASSESS-DOCD LE1/YR: CPT | Mod: CPTII,S$GLB,, | Performed by: PODIATRIST

## 2023-12-14 PROCEDURE — 3077F PR MOST RECENT SYSTOLIC BLOOD PRESSURE >= 140 MM HG: ICD-10-PCS | Mod: CPTII,S$GLB,, | Performed by: PODIATRIST

## 2023-12-14 PROCEDURE — 3077F SYST BP >= 140 MM HG: CPT | Mod: CPTII,S$GLB,, | Performed by: PODIATRIST

## 2023-12-14 PROCEDURE — 3078F DIAST BP <80 MM HG: CPT | Mod: CPTII,S$GLB,, | Performed by: PODIATRIST

## 2023-12-14 PROCEDURE — 99214 OFFICE O/P EST MOD 30 MIN: CPT | Mod: S$GLB,,, | Performed by: PODIATRIST

## 2023-12-14 PROCEDURE — 1159F PR MEDICATION LIST DOCUMENTED IN MEDICAL RECORD: ICD-10-PCS | Mod: CPTII,S$GLB,, | Performed by: PODIATRIST

## 2023-12-14 PROCEDURE — 1160F RVW MEDS BY RX/DR IN RCRD: CPT | Mod: CPTII,S$GLB,, | Performed by: PODIATRIST

## 2023-12-14 PROCEDURE — 3078F PR MOST RECENT DIASTOLIC BLOOD PRESSURE < 80 MM HG: ICD-10-PCS | Mod: CPTII,S$GLB,, | Performed by: PODIATRIST

## 2023-12-14 PROCEDURE — 3288F PR FALLS RISK ASSESSMENT DOCUMENTED: ICD-10-PCS | Mod: CPTII,S$GLB,, | Performed by: PODIATRIST

## 2023-12-14 PROCEDURE — 99999 PR PBB SHADOW E&M-EST. PATIENT-LVL V: ICD-10-PCS | Mod: PBBFAC,,, | Performed by: PODIATRIST

## 2023-12-14 PROCEDURE — 1159F MED LIST DOCD IN RCRD: CPT | Mod: CPTII,S$GLB,, | Performed by: PODIATRIST

## 2023-12-14 NOTE — PATIENT INSTRUCTIONS
Over the counter pain creams: Voltaren Gel, Biofreeze, Bengay, tiger balm, two old goat, lidocaine gel,  Absorbine Veterinary Liniment Gel Topical Analgesic Sore Muscle and Joint Pain Relief    Recommend lotions: eucerin, eucerin for diabetics, aquaphor, A&D ointment, gold bond for diabetics, sween, [place]'s Bees all purpose baby ointment,  urea 40 with aloe or SkinIntegra rapid crack repair (found on amazon.com)    Shoe recommendations: (try 6pm.com, zappos.com , nordstromrack.[employer], or shoes.com for discounted prices) you can visit varsity shoes in [place], DSW shoes in [place]  or jacob rack in the [facility] (there are also several shoe brand outlets in the [facility])    ONLY purchase stability style tennis shoes NO flex, foam, free, yoga mat style shoes    Shoe examples:    Asics (GT 4000 or gel foundations), new balance stability type shoes (such as the 940 series), saucony (stabil c3),  Drew (GTS or Beast or   transcend), propet, HokaOne (tennis shoe) Champ (tennis shoes and boots)    Anilft Abilio (women) Donna&Mich (men), clarks, crocs, aerosoles, naturalizers, SAS, ecco, born, ant good, rockports (dress shoes)    Vionic, burkenstocks, fitflops, propet, taos, baretraps (sandals)    HokaOne sandals, crocs, propet (house shoes)      Nail Home remedy:  Vicks Vapor rub or Emuaid to nails for easier manageability    Diabetes: Inspecting Your Feet  Diabetes increases your chances of developing foot problems. So inspect your feet every day. This helps you find small skin irritations before they become serious infections. If you have trouble seeing the bottoms of your feet, use a mirror or ask a family member or friend to help.     Pressure spots on the bottom of the foot are common areas where problems develop.   How to check your feet  Below are tips to help you look for foot problems. Try to check your feet at the same time each day, such as when you get out of bed in the morning:  Check the top of  each foot. The tops of toes, back of the heel, and outer edge of the foot can get a lot of rubbing from poor-fitting shoes.  Check the bottom of each foot. Daily wear and tear often leads to problems at pressure spots.  Check the toes and nails. Fungal infections often occur between toes. Toenail problems can also be a sign of fungal infections or lead to breaks in the skin.  Check your shoes, too. Loose objects inside a shoe can injure the foot. Use your hand to feel inside your shoes for things like robert, loose stitching, or rough areas that could irritate your skin.  Warning signs  Look for any color changes in the foot. Redness with streaks can signal a severe infection, which needs immediate medical attention. Tell your doctor right away if you have any of these problems:  Swelling, sometimes with color changes, may be a sign of poor blood flow or infection. Symptoms include tenderness and an increase in the size of your foot.  Warm or hot areas on your feet may be signs of infection. A foot that is cold may not be getting enough blood.  Sensations such as burning, tingling, or pins and needles can be signs of a problem. Also check for areas that may be numb.  Hot spots are caused by friction or pressure. Look for hot spots in areas that get a lot of rubbing. Hot spots can turn into blisters, calluses, or sores.  Cracks and sores are caused by dry or irritated skin. They are a sign that the skin is breaking down, which can lead to infection.  Toenail problems to watch for include nails growing into the skin (ingrown toenail) and causing redness or pain. Thick, yellow, or discolored nails can signal a fungal infection.  Drainage and odor can develop from untreated sores and ulcers. Call your doctor right away if you notice white or yellow drainage, bleeding, or unpleasant odor.   © 4624-5426 [employer]. [address] [place] [address], [place] [address]. All rights reserved. This information is not  intended as a substitute for professional medical care. Always follow your healthcare professional's instructions.        Step-by-Step:  Inspecting Your Feet (Diabetes)    Date Last Reviewed: 10/1/2016  © 9621-2149 [employer]. [address] [place] [address], [place] [address]. All rights reserved. This information is not intended as a substitute for professional medical care. Always follow your healthcare professional's instructions.

## 2023-12-15 NOTE — PROGRESS NOTES
Subjective:      Patient ID: Avni Daniels is a 78 y.o. male.    Chief Complaint: Diabetes Mellitus and Wound Check    Avni is a 78 y.o. male who presents to the clinic for evaluation and treatment of high risk feet. Avni has a past medical history of Diabetes mellitus, type 2 and Hypertension.  Complains of painful left foot callus and elongated, thickened toenails aggravated by increased weight bearing, shoe gear, pressure.   This patient has documented high risk feet requiring routine maintenance secondary to diabetes mellitis and those secondary complications of diabetes, as mentioned..      2/28/23: Reports pain left foot ulceration, reports cleansing area with peroxide.     3/8/2023 patient discontinues used of peroxide and has attempted to care for foot as instructed    3/16/2023   Patient has left football dressing clean, dry, intact.  Patient denies pain. No new pedal complaints.     3/22/2023   Patient has left football dressing clean, dry, intact.  Patient denies pain. No new pedal complaints.     4/12/2023   Patient returns to clinic for follow up of left foot wound.  Since our las encounter patient was hospitalized.  Presents to clinic with slid on cushion and tennis shoes.  Patient denies pain. No new pedal complaints.     05/23/2023   Patient returns to clinic for follow up of left foot wound. He has been caring for feet as instructed.Patient denies pain. No new pedal complaints.     07/05/2023   Patient returns to clinic for follow up of left foot wound. He has been caring for feet as instructed. Patient denies pain. No new pedal complaints.     09/13/2023 patient returns to clinic for follow-up of high-risk feet with history of left foot wound.  Patient relates he has been using U pads regularly but states that they have been wearing down.  Patient relates he recently started doing a walking regimen.  Patient relates he believes his time emptying get new prescription shoes.  Patient relates that he  is not been painting the site with Betadine.  He relates some increased callus formation sub 1st metatarsophalangeal joint of the left foot but denies seeing any drainage.    12/05/2023 patient returns to clinic for follow-up of high-risk feet with history of left foot wound.  Patient relates that while on his way to work the [employer] football game this weekend, his feet became wet from standing rain water.  He admits he did not change his shoes nor socks.  Once home he noted a color change to the previous wound site.  Patient relates that he began painting the site with Betadine. Denies seeing drainage.    12/14/2023 patient returns to clinic for follow-up of left foot wound.  Patient relates that he has been feeling pain to the lateral aspect of the left foot while in his dressing.  He denies excess ambulation but states that he has begun having restless legs while he is attempting to sleep and having abnormal sensations to the feet while he is sleeping.  He denies nausea, vomiting, fever, chills.          PCP: Reyes Gusman MD    Date Last Seen by PCP:   Chief Complaint   Patient presents with    Diabetes Mellitus    Wound Check       Hemoglobin A1C   Date Value Ref Range Status   10/02/2023 7.7 (H) <5.7 % Final   03/29/2023 7.7 (H) <5.7 % Final     Hemoglobin A1c   Date Value Ref Range Status   07/07/2020 8.3 (H) <5.7 % of total Hgb Final     Comment:     For someone without known diabetes, a hemoglobin A1c  value of 6.5% or greater indicates that they may have   diabetes and this should be confirmed with a follow-up   test.    For someone with known diabetes, a value <7% indicates   that their diabetes is well controlled and a value   greater than or equal to 7% indicates suboptimal   control. A1c targets should be individualized based on   duration of diabetes, age, comorbid conditions, and   other considerations.    Currently, no consensus exists regarding use of  hemoglobin A1c for diagnosis of diabetes for  "children.       03/05/2020 7.9 (H) <5.7 % of total Hgb Final     Comment:     For someone without known diabetes, a hemoglobin A1c  value of 6.5% or greater indicates that they may have   diabetes and this should be confirmed with a follow-up   test.    For someone with known diabetes, a value <7% indicates   that their diabetes is well controlled and a value   greater than or equal to 7% indicates suboptimal   control. A1c targets should be individualized based on   duration of diabetes, age, comorbid conditions, and   other considerations.    Currently, no consensus exists regarding use of  hemoglobin A1c for diagnosis of diabetes for children.               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      atorvastatin (LIPITOR) 40 MG tablet       clopidogrel (PLAVIX) 75 mg tablet Take 75 mg by mouth once.       doxycycline (VIBRAMYCIN) 100 MG Cap Take 1 capsule (100 mg total) by mouth every 12 (twelve) hours. 20 capsule 0    enalapril (VASOTEC) 5 MG tablet Take 5 mg by mouth once daily.       insulin NPH-insulin regular, 70/30, (NOVOLIN 70/30) 100 unit/mL (70-30) injection Inject 25 Units into the skin 2 (two) times daily.       insulin syringe-needle U-100 0.5 mL 31 gauge x 5/16 Syrg       insulin syringe-needle U-100 1/2 mL 30 gauge x 5/16 Syrg       linezolid (ZYVOX) 600 mg Tab Take 1 tablet (600 mg total) by mouth every 12 (twelve) hours. 20 tablet 0    metoprolol succinate (TOPROL-XL) 25 MG 24 hr tablet Take 25 mg by mouth once daily.      metoprolol tartrate (LOPRESSOR) 25 MG tablet Take 25 mg by mouth Daily.      PACERONE 100 mg Tab Take 100 mg by mouth once daily.       THINPRO INSULIN SYRINGE 0.5 mL 31 x 3/8" Syrg  " "     VITAMIN D2 1,250 mcg (50,000 unit) capsule TAKE 1 CAPSULE BY MOUTH ONCE EVERY MONTH       No current facility-administered medications on file prior to visit.       Review of patient's allergies indicates:  No Known Allergies    Past Surgical History:   Procedure Laterality Date    APPENDECTOMY      GALLBLADDER SURGERY      TUMOR REMOVAL Left     foot       History reviewed. No pertinent family history.    Social History     Socioeconomic History    Marital status:    Tobacco Use    Smoking status: Former    Smokeless tobacco: Former         Review of Systems   Constitution: Negative for chills and fever.   Cardiovascular: Negative for claudication and leg swelling.   Respiratory: Negative for cough and shortness of breath.    Skin: Positive for dry skin, nail changes and poor wound healing (hx of left foot ulcer). Negative for itching and rash.   Musculoskeletal: Positive for joint pain. Negative for falls, joint swelling and muscle weakness.   Gastrointestinal: Negative for diarrhea, nausea and vomiting.   Neurological: Positive for numbness and paresthesias. Negative for tremors and weakness.   Psychiatric/Behavioral: Negative for altered mental status and hallucinations.           Objective:       Vitals:    12/14/23 1012   BP: (!) 147/73   Pulse: 87   Weight: 67.6 kg (149 lb 0.5 oz)   Height: 5' 11" (1.803 m)   PainSc:   4         Physical Exam   Constitutional:  Non-toxic appearance. He does not have a sickly appearance. No distress.   Pt. is well-developed, well-nourished, appears stated age, in no acute distress, alert and oriented x 3. No evidence of depression, anxiety, or agitation. Calm, cooperative, and communicative. Appropriate interactions and affect.   Cardiovascular:   Pulses:       Dorsalis pedis pulses are 1+ on the right side, and 1+ on the left side.        Posterior tibial pulses are 1+ on the right side, and 1+ on the left side.   Dorsalis pedis and posterior tibial pulses are " diminished Bilaterally. Toes are cool to touch. Feet are warm proximally.There is decreased digital hair. Skin is atrophic   Pulmonary/Chest: No respiratory distress.   Musculoskeletal:        Right ankle: No tenderness. No lateral malleolus, no medial malleolus, no AITFL, no CF ligament and no posterior TFL tenderness found. Achilles tendon exhibits no pain, no defect and normal Elias's test results.        Left ankle: No tenderness. No lateral malleolus, no medial malleolus, no AITFL, no CF ligament and no posterior TFL tenderness found. Achilles tendon exhibits no pain, no defect and normal Elias's test results.        Right foot: There is no tenderness and no bony tenderness.        Left foot: There is no tenderness and no bony tenderness.     Decreased stride, station of gait.  apropulsive toe off.  Increased angle and base of gait.    Patient has hammertoes of digits 2-5 bilateral partially reducible without symptom today.    Visible and palpable bunion without pain at dorsomedial 1st metatarsal head right and left.  Hallux abducted right and left partially reducible, tracks laterally without being track bound.  No ecchymosis, erythema, edema, or cardinal signs infection or signs of trauma same foot.    Visible and palpable tailors bunion at dorsolateral 5th metatarsal head right and left.  5th digit is varus right and left partially reducible. No ecchymosis, erythema, edema, or cardinal signs infection.    Fat pad atrophy to heels and met heads bilateral     Lymphadenopathy:   No lymphatic streaking    Negative lymphadenopathy bilateral popliteal fossa and tarsal tunnel.     Neurological:   [place] 5.07 monofilamant testing is diminished Ryan feet. Decreased/absent vibratory sensation bilateral feet to 128Hz tuning fork.     Paresthesias, and hyperesthesia bilateral feet with no clearly identified trigger or source.    Skin: Skin is warm and dry. He is not diaphoretic. No cyanosis. No pallor.  Nails show no clubbing.   Skin is thin, atrophic, xerotic    Toenails 1-5 bilaterally discolored/yellowed, dystrophic, brittle with subungual debris.    See wound below    Psychiatric: His mood appears not anxious. His affect is not inappropriate. His speech is not slurred. He is not combative. He is communicative. He is attentive.   Nursing note reviewed.    12/14/2023    Sub 1st metatarsophalangeal joint of the left foot        Lateral 5th metatarsophalangeal joint and digit of the left foot        Distal medial hallux of the left foot      Distal lesser digits of the left foot        12/05/2023        Post debridement    Ulcer location: sub 1st MTPJ of the left foot  Measurements : 1.2x0.6x0.2  cm   Signs of infection: local edema, erythema  Drainage: Sero-Sanguinous  Purulence: No  Crepitus/fluctuance: No  Periwound: Reddened, Macerated, Calloused  Base: Mixed Granular/Fibrotic  Depth: subcutaneous tissue  Probe to bone: No          09/13/2023          7/5/2023        5/24/2023            4/12/23          3/22/23            3/16/23          3/8/23    Photo did not load  Scant purulence and depth noted    2/28/23: Post debridement            Assessment:       Encounter Diagnoses   Name Primary?    Type II diabetes mellitus with neurological manifestations Yes    Type 2 diabetes mellitus with left diabetic foot ulcer     Abrasion, left foot, initial encounter     Blister of fifth toe of left foot, initial encounter                  Plan:       Avni was seen today for diabetes mellitus and wound check.    Diagnoses and all orders for this visit:    Type II diabetes mellitus with neurological manifestations    Type 2 diabetes mellitus with left diabetic foot ulcer  -     US [place] Arteries Bilat with SVITLANA (xpd); Future    Abrasion, left foot, initial encounter    Blister of fifth toe of left foot, initial encounter        Education about the prevention of limb loss.    Discussed wound healing cycle, skin  integrity, ways to care for skin.Counseled patient on the effects of biomechanical pressure and blood glucose on healing. He verbalizes understanding that it can increase the chances of delayed healing and this prolonged exposure leads to infection or progression of infection which subsequently can result in loss of limb.    Adequate vitamin supplementation, protein intake, and hydration - discussed with patient    Significant signs of friction to the left forefoot that occurred in dressing.  Unsure if whether it was from shear from padding with walking or with him having this symptom of restless legs while sleeping.  I am concerned with all of these new abrasions to the foot given his history.  No arterial studies ordered    All sites are cleansed of foreign material as much as possible and the base inspected for bone or abscess.  Base is granular and without bone nor joint exposure.     Dressings: iodosorb  Offloading: football and darco shoe    Patient will remove his dressing to assess for any additional friction in 3-5 days.  He will then begin painting all sites with Betadine twice daily until he returns to clinic.    Follow-up: 1 week but should call [facility] immediately if any signs of infection, such as fever, chills, sweats, increased redness or pain.    Short-term goals include maintaining good offloading and minimizing bioburden, promoting granulation and epithelialization to healing.  Long-term goals include keeping the wound healed by good offloading and medical management under the direction of internist.    Shoe inspection. Diabetic Foot Education. Patient reminded of the importance of good nutrition and blood sugar control to help prevent podiatric complications of diabetes. Patient instructed on proper foot hygeine. We discussed wearing proper shoe gear, daily foot inspections, never walking without protective shoe gear, never putting sharp instruments to feet.        Procedures

## 2023-12-18 ENCOUNTER — OFFICE VISIT (OUTPATIENT)
Dept: PODIATRY | Facility: CLINIC | Age: 78
End: 2023-12-18
Payer: MEDICARE

## 2023-12-18 VITALS
BODY MASS INDEX: 20.87 KG/M2 | HEIGHT: 71 IN | SYSTOLIC BLOOD PRESSURE: 153 MMHG | DIASTOLIC BLOOD PRESSURE: 72 MMHG | WEIGHT: 149.06 LBS | HEART RATE: 90 BPM

## 2023-12-18 DIAGNOSIS — E11.621 TYPE 2 DIABETES MELLITUS WITH LEFT DIABETIC FOOT ULCER: Primary | ICD-10-CM

## 2023-12-18 DIAGNOSIS — L97.529 TYPE 2 DIABETES MELLITUS WITH LEFT DIABETIC FOOT ULCER: Primary | ICD-10-CM

## 2023-12-18 PROCEDURE — 3077F SYST BP >= 140 MM HG: CPT | Mod: CPTII,S$GLB,, | Performed by: PODIATRIST

## 2023-12-18 PROCEDURE — 1126F AMNT PAIN NOTED NONE PRSNT: CPT | Mod: CPTII,S$GLB,, | Performed by: PODIATRIST

## 2023-12-18 PROCEDURE — 1101F PR PT FALLS ASSESS DOC 0-1 FALLS W/OUT INJ PAST YR: ICD-10-PCS | Mod: CPTII,S$GLB,, | Performed by: PODIATRIST

## 2023-12-18 PROCEDURE — 99999 PR PBB SHADOW E&M-EST. PATIENT-LVL IV: ICD-10-PCS | Mod: PBBFAC,,, | Performed by: PODIATRIST

## 2023-12-18 PROCEDURE — 3078F DIAST BP <80 MM HG: CPT | Mod: CPTII,S$GLB,, | Performed by: PODIATRIST

## 2023-12-18 PROCEDURE — 1101F PT FALLS ASSESS-DOCD LE1/YR: CPT | Mod: CPTII,S$GLB,, | Performed by: PODIATRIST

## 2023-12-18 PROCEDURE — 99213 PR OFFICE/OUTPT VISIT, EST, LEVL III, 20-29 MIN: ICD-10-PCS | Mod: S$GLB,,, | Performed by: PODIATRIST

## 2023-12-18 PROCEDURE — 99999 PR PBB SHADOW E&M-EST. PATIENT-LVL IV: CPT | Mod: PBBFAC,,, | Performed by: PODIATRIST

## 2023-12-18 PROCEDURE — 3077F PR MOST RECENT SYSTOLIC BLOOD PRESSURE >= 140 MM HG: ICD-10-PCS | Mod: CPTII,S$GLB,, | Performed by: PODIATRIST

## 2023-12-18 PROCEDURE — 3288F FALL RISK ASSESSMENT DOCD: CPT | Mod: CPTII,S$GLB,, | Performed by: PODIATRIST

## 2023-12-18 PROCEDURE — 1159F PR MEDICATION LIST DOCUMENTED IN MEDICAL RECORD: ICD-10-PCS | Mod: CPTII,S$GLB,, | Performed by: PODIATRIST

## 2023-12-18 PROCEDURE — 1126F PR PAIN SEVERITY QUANTIFIED, NO PAIN PRESENT: ICD-10-PCS | Mod: CPTII,S$GLB,, | Performed by: PODIATRIST

## 2023-12-18 PROCEDURE — 99213 OFFICE O/P EST LOW 20 MIN: CPT | Mod: S$GLB,,, | Performed by: PODIATRIST

## 2023-12-18 PROCEDURE — 3288F PR FALLS RISK ASSESSMENT DOCUMENTED: ICD-10-PCS | Mod: CPTII,S$GLB,, | Performed by: PODIATRIST

## 2023-12-18 PROCEDURE — 3078F PR MOST RECENT DIASTOLIC BLOOD PRESSURE < 80 MM HG: ICD-10-PCS | Mod: CPTII,S$GLB,, | Performed by: PODIATRIST

## 2023-12-18 PROCEDURE — 1159F MED LIST DOCD IN RCRD: CPT | Mod: CPTII,S$GLB,, | Performed by: PODIATRIST

## 2023-12-21 ENCOUNTER — HOSPITAL ENCOUNTER (OUTPATIENT)
Dept: RADIOLOGY | Facility: HOSPITAL | Age: 78
Discharge: HOME | End: 2023-12-21
Attending: PODIATRIST
Payer: MEDICARE

## 2023-12-21 DIAGNOSIS — E11.621 TYPE 2 DIABETES MELLITUS WITH LEFT DIABETIC FOOT ULCER: ICD-10-CM

## 2023-12-21 DIAGNOSIS — L97.529 TYPE 2 DIABETES MELLITUS WITH LEFT DIABETIC FOOT ULCER: ICD-10-CM

## 2023-12-21 PROCEDURE — 93925 LOWER EXTREMITY STUDY: CPT | Mod: 26,,, | Performed by: RADIOLOGY

## 2023-12-21 PROCEDURE — 93925 US LOWER EXTREMITY ARTERIES BILATERAL: ICD-10-PCS | Mod: 26,,, | Performed by: RADIOLOGY

## 2023-12-21 PROCEDURE — 93925 LOWER EXTREMITY STUDY: CPT | Mod: TC

## 2023-12-22 NOTE — PROGRESS NOTES
Subjective:      Patient ID: Avni Daniels is a 78 y.o. male.    Chief Complaint: Diabetes Mellitus (10/2/23 Dr Gusman) and Wound Check    Avni is a 78 y.o. male who presents to the clinic for evaluation and treatment of high risk feet. Avni has a past medical history of Diabetes mellitus, type 2 and Hypertension.  Complains of painful left foot callus and elongated, thickened toenails aggravated by increased weight bearing, shoe gear, pressure.   This patient has documented high risk feet requiring routine maintenance secondary to diabetes mellitis and those secondary complications of diabetes, as mentioned..      2/28/23: Reports pain left foot ulceration, reports cleansing area with peroxide.     3/8/2023 patient discontinues used of peroxide and has attempted to care for foot as instructed    3/16/2023   Patient has left football dressing clean, dry, intact.  Patient denies pain. No new pedal complaints.     3/22/2023   Patient has left football dressing clean, dry, intact.  Patient denies pain. No new pedal complaints.     4/12/2023   Patient returns to clinic for follow up of left foot wound.  Since our las encounter patient was hospitalized.  Presents to clinic with slid on cushion and tennis shoes.  Patient denies pain. No new pedal complaints.     05/23/2023   Patient returns to clinic for follow up of left foot wound. He has been caring for feet as instructed.Patient denies pain. No new pedal complaints.     07/05/2023   Patient returns to clinic for follow up of left foot wound. He has been caring for feet as instructed. Patient denies pain. No new pedal complaints.     09/13/2023 patient returns to clinic for follow-up of high-risk feet with history of left foot wound.  Patient relates he has been using U pads regularly but states that they have been wearing down.  Patient relates he recently started doing a walking regimen.  Patient relates he believes his time emptying get new prescription shoes.   Patient relates that he is not been painting the site with Betadine.  He relates some increased callus formation sub 1st metatarsophalangeal joint of the left foot but denies seeing any drainage.    12/05/2023 patient returns to clinic for follow-up of high-risk feet with history of left foot wound.  Patient relates that while on his way to work the [employer] football game this weekend, his feet became wet from standing rain water.  He admits he did not change his shoes nor socks.  Once home he noted a color change to the previous wound site.  Patient relates that he began painting the site with Betadine. Denies seeing drainage.    12/14/2023 patient returns to clinic for follow-up of left foot wound.  Patient relates that he has been feeling pain to the lateral aspect of the left foot while in his dressing.  He denies excess ambulation but states that he has begun having restless legs while he is attempting to sleep and having abnormal sensations to the feet while he is sleeping.  He denies nausea, vomiting, fever, chills.    12/18/23: F/u left foot ulceration. Recently completed PO abx last week.       PCP: Reyes Gusman MD    Date Last Seen by PCP:   Chief Complaint   Patient presents with    Diabetes Mellitus     10/2/23 Dr Gusman    Wound Check       Hemoglobin A1C   Date Value Ref Range Status   10/02/2023 7.7 (H) <5.7 % Final   03/29/2023 7.7 (H) <5.7 % Final     Hemoglobin A1c   Date Value Ref Range Status   07/07/2020 8.3 (H) <5.7 % of total Hgb Final     Comment:     For someone without known diabetes, a hemoglobin A1c  value of 6.5% or greater indicates that they may have   diabetes and this should be confirmed with a follow-up   test.    For someone with known diabetes, a value <7% indicates   that their diabetes is well controlled and a value   greater than or equal to 7% indicates suboptimal   control. A1c targets should be individualized based on   duration of diabetes, age, comorbid conditions, and    other considerations.    Currently, no consensus exists regarding use of  hemoglobin A1c for diagnosis of diabetes for children.       03/05/2020 7.9 (H) <5.7 % of total Hgb Final     Comment:     For someone without known diabetes, a hemoglobin A1c  value of 6.5% or greater indicates that they may have   diabetes and this should be confirmed with a follow-up   test.    For someone with known diabetes, a value <7% indicates   that their diabetes is well controlled and a value   greater than or equal to 7% indicates suboptimal   control. A1c targets should be individualized based on   duration of diabetes, age, comorbid conditions, and   other considerations.    Currently, no consensus exists regarding use of  hemoglobin A1c for diagnosis of diabetes for children.               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      atorvastatin (LIPITOR) 40 MG tablet       clopidogrel (PLAVIX) 75 mg tablet Take 75 mg by mouth once.       doxycycline (VIBRAMYCIN) 100 MG Cap Take 1 capsule (100 mg total) by mouth every 12 (twelve) hours. 20 capsule 0    enalapril (VASOTEC) 5 MG tablet Take 5 mg by mouth once daily.       insulin NPH-insulin regular, 70/30, (NOVOLIN 70/30) 100 unit/mL (70-30) injection Inject 25 Units into the skin 2 (two) times daily.       insulin syringe-needle U-100 0.5 mL 31 gauge x 5/16 Syrg       insulin syringe-needle U-100 1/2 mL 30 gauge x 5/16 Syrg       linezolid (ZYVOX) 600 mg Tab Take 1 tablet (600 mg total) by mouth every 12 (twelve) hours. 20 tablet 0    metoprolol succinate (TOPROL-XL) 25 MG 24 hr tablet Take 25 mg by mouth once daily.      metoprolol tartrate (LOPRESSOR) 25 MG tablet Take 25 mg by  "mouth Daily.      PACERONE 100 mg Tab Take 100 mg by mouth once daily.       THINPRO INSULIN SYRINGE 0.5 mL 31 x 3/8" Syrg       VITAMIN D2 1,250 mcg (50,000 unit) capsule TAKE 1 CAPSULE BY MOUTH ONCE EVERY MONTH       No current facility-administered medications on file prior to visit.       Review of patient's allergies indicates:  No Known Allergies    Past Surgical History:   Procedure Laterality Date    APPENDECTOMY      GALLBLADDER SURGERY      TUMOR REMOVAL Left     foot       No family history on file.    Social History     Socioeconomic History    Marital status:    Tobacco Use    Smoking status: Former    Smokeless tobacco: Former         Review of Systems   Constitution: Negative for chills and fever.   Cardiovascular: Negative for claudication and leg swelling.   Respiratory: Negative for cough and shortness of breath.    Skin: Positive for dry skin, nail changes and poor wound healing (hx of left foot ulcer). Negative for itching and rash.   Musculoskeletal: Positive for joint pain. Negative for falls, joint swelling and muscle weakness.   Gastrointestinal: Negative for diarrhea, nausea and vomiting.   Neurological: Positive for numbness and paresthesias. Negative for tremors and weakness.   Psychiatric/Behavioral: Negative for altered mental status and hallucinations.           Objective:       Vitals:    12/18/23 1423   BP: (!) 153/72   Pulse: 90   Weight: 67.6 kg (149 lb 0.5 oz)   Height: 5' 11" (1.803 m)   PainSc: 0-No pain         Physical Exam   Constitutional:  Non-toxic appearance. He does not have a sickly appearance. No distress.   Pt. is well-developed, well-nourished, appears stated age, in no acute distress, alert and oriented x 3. No evidence of depression, anxiety, or agitation. Calm, cooperative, and communicative. Appropriate interactions and affect.   Cardiovascular:   Pulses:       Dorsalis pedis pulses are 1+ on the right side, and 1+ on the left side.        Posterior tibial " pulses are 1+ on the right side, and 1+ on the left side.   Dorsalis pedis and posterior tibial pulses are diminished Bilaterally. Toes are cool to touch. Feet are warm proximally.There is decreased digital hair. Skin is atrophic   Pulmonary/Chest: No respiratory distress.   Musculoskeletal:        Right ankle: No tenderness. No lateral malleolus, no medial malleolus, no AITFL, no CF ligament and no posterior TFL tenderness found. Achilles tendon exhibits no pain, no defect and normal Elias's test results.        Left ankle: No tenderness. No lateral malleolus, no medial malleolus, no AITFL, no CF ligament and no posterior TFL tenderness found. Achilles tendon exhibits no pain, no defect and normal Elias's test results.        Right foot: There is no tenderness and no bony tenderness.        Left foot: There is no tenderness and no bony tenderness.     Decreased stride, station of gait.  apropulsive toe off.  Increased angle and base of gait.    Patient has hammertoes of digits 2-5 bilateral partially reducible without symptom today.    Visible and palpable bunion without pain at dorsomedial 1st metatarsal head right and left.  Hallux abducted right and left partially reducible, tracks laterally without being track bound.  No ecchymosis, erythema, edema, or cardinal signs infection or signs of trauma same foot.    Visible and palpable tailors bunion at dorsolateral 5th metatarsal head right and left.  5th digit is varus right and left partially reducible. No ecchymosis, erythema, edema, or cardinal signs infection.    Fat pad atrophy to heels and met heads bilateral     Lymphadenopathy:   No lymphatic streaking    Negative lymphadenopathy bilateral popliteal fossa and tarsal tunnel.     Neurological:   Clark-Tamanna 5.07 monofilamant testing is diminished Ryan feet. Decreased/absent vibratory sensation bilateral feet to 128Hz tuning fork.     Paresthesias, and hyperesthesia bilateral feet with no clearly  identified trigger or source.    Skin: Skin is warm and dry. He is not diaphoretic. No cyanosis. No pallor. Nails show no clubbing.   Skin is thin, atrophic, xerotic    Toenails 1-5 bilaterally discolored/yellowed, dystrophic, brittle with subungual debris.    See wound below    Psychiatric: His mood appears not anxious. His affect is not inappropriate. His speech is not slurred. He is not combative. He is communicative. He is attentive.   Nursing note reviewed.    12/18/23:              12/14/2023    Sub 1st metatarsophalangeal joint of the left foot        Lateral 5th metatarsophalangeal joint and digit of the left foot        Distal medial hallux of the left foot      Distal lesser digits of the left foot        12/05/2023        Post debridement    Ulcer location: sub 1st MTPJ of the left foot  Measurements : 1.2x0.6x0.2  cm   Signs of infection: local edema, erythema  Drainage: Sero-Sanguinous  Purulence: No  Crepitus/fluctuance: No  Periwound: Reddened, Macerated, Calloused  Base: Mixed Granular/Fibrotic  Depth: subcutaneous tissue  Probe to bone: No          09/13/2023          7/5/2023        5/24/2023            4/12/23          3/22/23            3/16/23          3/8/23    Photo did not load  Scant purulence and depth noted    2/28/23: Post debridement            Assessment:       Encounter Diagnosis   Name Primary?    Type 2 diabetes mellitus with left diabetic foot ulcer Yes                   Plan:       Avni was seen today for diabetes mellitus and wound check.    Diagnoses and all orders for this visit:    Type 2 diabetes mellitus with left diabetic foot ulcer          Education about the prevention of limb loss.    Discussed wound healing cycle, skin integrity, ways to care for skin.Counseled patient on the effects of biomechanical pressure and blood glucose on healing. He verbalizes understanding that it can increase the chances of delayed healing and this prolonged exposure leads to infection or  progression of infection which subsequently can result in loss of limb.    Adequate vitamin supplementation, protein intake, and hydration - discussed with patient    All sites are cleansed of foreign material as much as possible and the base inspected for bone or abscess.  Base is granular and without bone nor joint exposure.     Dressings: iodosorb  Offloading: football and darco shoe      Follow-up: 1 week but should call [facility] immediately if any signs of infection, such as fever, chills, sweats, increased redness or pain.    Short-term goals include maintaining good offloading and minimizing bioburden, promoting granulation and epithelialization to healing.  Long-term goals include keeping the wound healed by good offloading and medical management under the direction of internist.    Shoe inspection. Diabetic Foot Education. Patient reminded of the importance of good nutrition and blood sugar control to help prevent podiatric complications of diabetes. Patient instructed on proper foot hygeine. We discussed wearing proper shoe gear, daily foot inspections, never walking without protective shoe gear, never putting sharp instruments to feet.

## 2023-12-28 ENCOUNTER — OFFICE VISIT (OUTPATIENT)
Dept: PODIATRY | Facility: CLINIC | Age: 78
End: 2023-12-28
Payer: MEDICARE

## 2023-12-28 VITALS
SYSTOLIC BLOOD PRESSURE: 130 MMHG | DIASTOLIC BLOOD PRESSURE: 71 MMHG | BODY MASS INDEX: 20.87 KG/M2 | WEIGHT: 149.06 LBS | HEIGHT: 71 IN | HEART RATE: 90 BPM

## 2023-12-28 DIAGNOSIS — E11.49 TYPE II DIABETES MELLITUS WITH NEUROLOGICAL MANIFESTATIONS: ICD-10-CM

## 2023-12-28 DIAGNOSIS — E11.621 TYPE 2 DIABETES MELLITUS WITH LEFT DIABETIC FOOT ULCER: Primary | ICD-10-CM

## 2023-12-28 DIAGNOSIS — L97.529 TYPE 2 DIABETES MELLITUS WITH LEFT DIABETIC FOOT ULCER: Primary | ICD-10-CM

## 2023-12-28 PROCEDURE — 1159F MED LIST DOCD IN RCRD: CPT | Mod: CPTII,S$GLB,, | Performed by: PODIATRIST

## 2023-12-28 PROCEDURE — 3075F SYST BP GE 130 - 139MM HG: CPT | Mod: CPTII,S$GLB,, | Performed by: PODIATRIST

## 2023-12-28 PROCEDURE — 1160F RVW MEDS BY RX/DR IN RCRD: CPT | Mod: CPTII,S$GLB,, | Performed by: PODIATRIST

## 2023-12-28 PROCEDURE — 99214 OFFICE O/P EST MOD 30 MIN: CPT | Mod: S$GLB,,, | Performed by: PODIATRIST

## 2023-12-28 PROCEDURE — 3078F DIAST BP <80 MM HG: CPT | Mod: CPTII,S$GLB,, | Performed by: PODIATRIST

## 2023-12-28 PROCEDURE — 3288F FALL RISK ASSESSMENT DOCD: CPT | Mod: CPTII,S$GLB,, | Performed by: PODIATRIST

## 2023-12-28 PROCEDURE — 1126F AMNT PAIN NOTED NONE PRSNT: CPT | Mod: CPTII,S$GLB,, | Performed by: PODIATRIST

## 2023-12-28 PROCEDURE — 1101F PT FALLS ASSESS-DOCD LE1/YR: CPT | Mod: CPTII,S$GLB,, | Performed by: PODIATRIST

## 2023-12-28 PROCEDURE — 99999 PR PBB SHADOW E&M-EST. PATIENT-LVL IV: CPT | Mod: PBBFAC,,, | Performed by: PODIATRIST

## 2024-01-02 NOTE — PROGRESS NOTES
Subjective:      Patient ID: Avni Daniels is a 78 y.o. male.    Chief Complaint: Diabetes Mellitus and Wound Check    Avni is a 78 y.o. male who presents to the clinic for evaluation and treatment of high risk feet. Avni has a past medical history of Diabetes mellitus, type 2 and Hypertension.  Complains of painful left foot callus and elongated, thickened toenails aggravated by increased weight bearing, shoe gear, pressure.   This patient has documented high risk feet requiring routine maintenance secondary to diabetes mellitis and those secondary complications of diabetes, as mentioned..      2/28/23: Reports pain left foot ulceration, reports cleansing area with peroxide.     3/8/2023 patient discontinues used of peroxide and has attempted to care for foot as instructed    3/16/2023   Patient has left football dressing clean, dry, intact.  Patient denies pain. No new pedal complaints.     3/22/2023   Patient has left football dressing clean, dry, intact.  Patient denies pain. No new pedal complaints.     4/12/2023   Patient returns to clinic for follow up of left foot wound.  Since our las encounter patient was hospitalized.  Presents to clinic with slid on cushion and tennis shoes.  Patient denies pain. No new pedal complaints.     05/23/2023   Patient returns to clinic for follow up of left foot wound. He has been caring for feet as instructed.Patient denies pain. No new pedal complaints.     07/05/2023   Patient returns to clinic for follow up of left foot wound. He has been caring for feet as instructed. Patient denies pain. No new pedal complaints.     09/13/2023 patient returns to clinic for follow-up of high-risk feet with history of left foot wound.  Patient relates he has been using U pads regularly but states that they have been wearing down.  Patient relates he recently started doing a walking regimen.  Patient relates he believes his time emptying get new prescription shoes.  Patient relates that he  is not been painting the site with Betadine.  He relates some increased callus formation sub 1st metatarsophalangeal joint of the left foot but denies seeing any drainage.    12/05/2023 patient returns to clinic for follow-up of high-risk feet with history of left foot wound.  Patient relates that while on his way to work the [employer] football game this weekend, his feet became wet from standing rain water.  He admits he did not change his shoes nor socks.  Once home he noted a color change to the previous wound site.  Patient relates that he began painting the site with Betadine. Denies seeing drainage.    12/14/2023 patient returns to clinic for follow-up of left foot wound.  Patient relates that he has been feeling pain to the lateral aspect of the left foot while in his dressing.  He denies excess ambulation but states that he has begun having restless legs while he is attempting to sleep and having abnormal sensations to the feet while he is sleeping.  He denies nausea, vomiting, fever, chills.    12/18/23: F/u left foot ulceration. Recently completed PO abx last week.     12/28/2023 patient returns to clinic for follow-up of left foot wound.  Patient relates that he no longer has been left foot pain nor has he been experiencing restless legs while he is attempting to sleep or having abnormal sensations to the feet while he is sleeping.  He has had arterial studies since our last encounter and has an appointment scheduled with his non [facility] cardiologist.  No new pedal complaints. He denies nausea, vomiting, fever, chills.      PCP: Reyes Gusman MD    Date Last Seen by PCP:   Chief Complaint   Patient presents with    Diabetes Mellitus    Wound Check       Hemoglobin A1C   Date Value Ref Range Status   10/02/2023 7.7 (H) <5.7 % Final   03/29/2023 7.7 (H) <5.7 % Final     Hemoglobin A1c   Date Value Ref Range Status   07/07/2020 8.3 (H) <5.7 % of total Hgb Final     Comment:     For someone without known  diabetes, a hemoglobin A1c  value of 6.5% or greater indicates that they may have   diabetes and this should be confirmed with a follow-up   test.    For someone with known diabetes, a value <7% indicates   that their diabetes is well controlled and a value   greater than or equal to 7% indicates suboptimal   control. A1c targets should be individualized based on   duration of diabetes, age, comorbid conditions, and   other considerations.    Currently, no consensus exists regarding use of  hemoglobin A1c for diagnosis of diabetes for children.       03/05/2020 7.9 (H) <5.7 % of total Hgb Final     Comment:     For someone without known diabetes, a hemoglobin A1c  value of 6.5% or greater indicates that they may have   diabetes and this should be confirmed with a follow-up   test.    For someone with known diabetes, a value <7% indicates   that their diabetes is well controlled and a value   greater than or equal to 7% indicates suboptimal   control. A1c targets should be individualized based on   duration of diabetes, age, comorbid conditions, and   other considerations.    Currently, no consensus exists regarding use of  hemoglobin A1c for diagnosis of diabetes for children.               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      atorvastatin (LIPITOR) 40 MG tablet       clopidogrel (PLAVIX) 75 mg tablet Take 75 mg by mouth once.       doxycycline (VIBRAMYCIN) 100 MG Cap Take 1 capsule (100 mg total) by mouth every 12 (twelve) hours. 20 capsule 0    enalapril (VASOTEC) 5 MG tablet Take 5 mg by mouth once daily.       insulin NPH-insulin regular, 70/30, (NOVOLIN 70/30) 100 unit/mL (70-30) injection Inject 25 Units into  "the skin 2 (two) times daily.       insulin syringe-needle U-100 0.5 mL 31 gauge x 5/16 Syrg       insulin syringe-needle U-100 1/2 mL 30 gauge x 5/16 Syrg       linezolid (ZYVOX) 600 mg Tab Take 1 tablet (600 mg total) by mouth every 12 (twelve) hours. 20 tablet 0    metoprolol succinate (TOPROL-XL) 25 MG 24 hr tablet Take 25 mg by mouth once daily.      metoprolol tartrate (LOPRESSOR) 25 MG tablet Take 25 mg by mouth Daily.      PACERONE 100 mg Tab Take 100 mg by mouth once daily.       THINPRO INSULIN SYRINGE 0.5 mL 31 x 3/8" Syrg       VITAMIN D2 1,250 mcg (50,000 unit) capsule TAKE 1 CAPSULE BY MOUTH ONCE EVERY MONTH       No current facility-administered medications on file prior to visit.       Review of patient's allergies indicates:  No Known Allergies    Past Surgical History:   Procedure Laterality Date    APPENDECTOMY      GALLBLADDER SURGERY      TUMOR REMOVAL Left     foot       History reviewed. No pertinent family history.    Social History     Socioeconomic History    Marital status:    Tobacco Use    Smoking status: Former    Smokeless tobacco: Former         Review of Systems   Constitution: Negative for chills and fever.   Cardiovascular: Negative for claudication and leg swelling.   Respiratory: Negative for cough and shortness of breath.    Skin: Positive for dry skin, nail changes and poor wound healing. Negative for itching and rash.   Musculoskeletal: Positive for joint pain. Negative for falls, joint swelling and muscle weakness.   Gastrointestinal: Negative for diarrhea, nausea and vomiting.   Neurological: Positive for numbness and paresthesias. Negative for tremors and weakness.   Psychiatric/Behavioral: Negative for altered mental status and hallucinations.           Objective:       Vitals:    12/28/23 1323   BP: 130/71   Pulse: 90   Weight: 67.6 kg (149 lb 0.5 oz)   Height: 5' 11" (1.803 m)   PainSc: 0-No pain       Physical Exam   Constitutional:  Non-toxic appearance. He " does not have a sickly appearance. No distress.   Pt. is well-developed, well-nourished, appears stated age, in no acute distress, alert and oriented x 3. No evidence of depression, anxiety, or agitation. Calm, cooperative, and communicative. Appropriate interactions and affect.   Cardiovascular:   Pulses:       Dorsalis pedis pulses are 1+ on the right side, and 1+ on the left side.        Posterior tibial pulses are 1+ on the right side, and 1+ on the left side.   Dorsalis pedis and posterior tibial pulses are diminished Bilaterally. Toes are cool to touch. Feet are warm proximally.There is decreased digital hair. Skin is atrophic   Pulmonary/Chest: No respiratory distress.   Musculoskeletal:        Right ankle: No tenderness. No lateral malleolus, no medial malleolus, no AITFL, no CF ligament and no posterior TFL tenderness found. Achilles tendon exhibits no pain, no defect and normal Elias's test results.        Left ankle: No tenderness. No lateral malleolus, no medial malleolus, no AITFL, no CF ligament and no posterior TFL tenderness found. Achilles tendon exhibits no pain, no defect and normal Elias's test results.        Right foot: There is no tenderness and no bony tenderness.        Left foot: There is no tenderness and no bony tenderness.     Decreased stride, station of gait.  apropulsive toe off.  Increased angle and base of gait.    Patient has hammertoes of digits 2-5 bilateral partially reducible without symptom today.    Visible and palpable bunion without pain at dorsomedial 1st metatarsal head right and left.  Hallux abducted right and left partially reducible, tracks laterally without being track bound.  No ecchymosis, erythema, edema, or cardinal signs infection or signs of trauma same foot.    Visible and palpable tailors bunion at dorsolateral 5th metatarsal head right and left.  5th digit is varus right and left partially reducible. No ecchymosis, erythema, edema, or cardinal signs  infection.    Fat pad atrophy to heels and met heads bilateral     Lymphadenopathy:   No lymphatic streaking    Negative lymphadenopathy bilateral popliteal fossa and tarsal tunnel.     Neurological:   [place]-Tamanna 5.07 monofilamant testing is diminished Ryan feet. Decreased/absent vibratory sensation bilateral feet to 128Hz tuning fork.     Paresthesias, and hyperesthesia bilateral feet with no clearly identified trigger or source.    Skin: Skin is warm and dry. He is not diaphoretic. No cyanosis. No pallor. Nails show no clubbing.   Skin is thin, atrophic, xerotic    Toenails 1-5 bilaterally discolored/yellowed, dystrophic, brittle with subungual debris.    See wound below    Psychiatric: His mood appears not anxious. His affect is not inappropriate. His speech is not slurred. He is not combative. He is communicative. He is attentive.   Nursing note reviewed.    12/28/2023    Sub 1st MTPJ        Lateral 5th MTPJ          12/18/23:              12/14/2023    Sub 1st metatarsophalangeal joint of the left foot        Lateral 5th metatarsophalangeal joint and digit of the left foot        Distal medial hallux of the left foot      Distal lesser digits of the left foot        12/05/2023        Post debridement    Ulcer location: sub 1st MTPJ of the left foot  Measurements : 1.2x0.6x0.2  cm   Signs of infection: local edema, erythema  Drainage: Sero-Sanguinous  Purulence: No  Crepitus/fluctuance: No  Periwound: Reddened, Macerated, Calloused  Base: Mixed Granular/Fibrotic  Depth: subcutaneous tissue  Probe to bone: No          09/13/2023          7/5/2023        5/24/2023            4/12/23          3/22/23            3/16/23          3/8/23    Photo did not load  Scant purulence and depth noted    2/28/23: Post debridement            Assessment:       Encounter Diagnoses   Name Primary?    Type 2 diabetes mellitus with left diabetic foot ulcer Yes    Type II diabetes mellitus with neurological manifestations                     Plan:       Avni was seen today for diabetes mellitus and wound check.    Diagnoses and all orders for this visit:    Type 2 diabetes mellitus with left diabetic foot ulcer    Type II diabetes mellitus with neurological manifestations          Education about the prevention of limb loss.    Discussed wound healing cycle, skin integrity, ways to care for skin.Counseled patient on the effects of biomechanical pressure and blood glucose on healing. He verbalizes understanding that it can increase the chances of delayed healing and this prolonged exposure leads to infection or progression of infection which subsequently can result in loss of limb.    Reviewed arterial studies in detail.  Will defer to his cardiologist on if intervention is indicated. Wounds appears to pre progression toward healing.      All sites are cleansed of foreign material as much as possible and the base inspected for bone or abscess.  Base is granular and without bone nor joint exposure.     All sites improved    Dressings: cellerate  Offloading: football and darco shoe      Follow-up: 1 week but should call [facility] immediately if any signs of infection, such as fever, chills, sweats, increased redness or pain.    Short-term goals include maintaining good offloading and minimizing bioburden, promoting granulation and epithelialization to healing.  Long-term goals include keeping the wound healed by good offloading and medical management under the direction of internist.    Shoe inspection. Diabetic Foot Education. Patient reminded of the importance of good nutrition and blood sugar control to help prevent podiatric complications of diabetes. Patient instructed on proper foot hygeine. We discussed wearing proper shoe gear, daily foot inspections, never walking without protective shoe gear, never putting sharp instruments to feet.        I spent a total of 32 minutes on the day of the visit.This includes face to face time and  non-face to face time preparing to see the patient (eg, review of tests), obtaining and/or reviewing separately obtained history, documenting clinical information in the electronic or other health record, independently interpreting results and communicating results to the patient/family/caregiver, or care coordinator.

## 2024-01-04 ENCOUNTER — OFFICE VISIT (OUTPATIENT)
Dept: PODIATRY | Facility: CLINIC | Age: 79
End: 2024-01-04
Payer: MEDICARE

## 2024-01-04 VITALS — BODY MASS INDEX: 20.87 KG/M2 | HEIGHT: 71 IN | WEIGHT: 149.06 LBS

## 2024-01-04 DIAGNOSIS — L97.529 TYPE 2 DIABETES MELLITUS WITH LEFT DIABETIC FOOT ULCER: Primary | ICD-10-CM

## 2024-01-04 DIAGNOSIS — E11.49 TYPE II DIABETES MELLITUS WITH NEUROLOGICAL MANIFESTATIONS: ICD-10-CM

## 2024-01-04 DIAGNOSIS — E11.621 TYPE 2 DIABETES MELLITUS WITH LEFT DIABETIC FOOT ULCER: Primary | ICD-10-CM

## 2024-01-04 PROCEDURE — 99999 PR PBB SHADOW E&M-EST. PATIENT-LVL IV: CPT | Mod: PBBFAC,,, | Performed by: PODIATRIST

## 2024-01-04 PROCEDURE — 99213 OFFICE O/P EST LOW 20 MIN: CPT | Mod: S$GLB,,, | Performed by: PODIATRIST

## 2024-01-08 NOTE — PROGRESS NOTES
Subjective:      Patient ID: Avni Daniels is a 79 y.o. male.    Chief Complaint: Diabetes Mellitus and Wound Check    Avni is a 79 y.o. male who presents to the clinic for evaluation and treatment of high risk feet. Avni has a past medical history of Diabetes mellitus, type 2 and Hypertension.  Complains of painful left foot callus and elongated, thickened toenails aggravated by increased weight bearing, shoe gear, pressure.   This patient has documented high risk feet requiring routine maintenance secondary to diabetes mellitis and those secondary complications of diabetes, as mentioned..      2/28/23: Reports pain left foot ulceration, reports cleansing area with peroxide.     3/8/2023 patient discontinues used of peroxide and has attempted to care for foot as instructed    3/16/2023   Patient has left football dressing clean, dry, intact.  Patient denies pain. No new pedal complaints.     3/22/2023   Patient has left football dressing clean, dry, intact.  Patient denies pain. No new pedal complaints.     4/12/2023   Patient returns to clinic for follow up of left foot wound.  Since our las encounter patient was hospitalized.  Presents to clinic with slid on cushion and tennis shoes.  Patient denies pain. No new pedal complaints.     05/23/2023   Patient returns to clinic for follow up of left foot wound. He has been caring for feet as instructed.Patient denies pain. No new pedal complaints.     07/05/2023   Patient returns to clinic for follow up of left foot wound. He has been caring for feet as instructed. Patient denies pain. No new pedal complaints.     09/13/2023 patient returns to clinic for follow-up of high-risk feet with history of left foot wound.  Patient relates he has been using U pads regularly but states that they have been wearing down.  Patient relates he recently started doing a walking regimen.  Patient relates he believes his time emptying get new prescription shoes.  Patient relates that he  is not been painting the site with Betadine.  He relates some increased callus formation sub 1st metatarsophalangeal joint of the left foot but denies seeing any drainage.    12/05/2023 patient returns to clinic for follow-up of high-risk feet with history of left foot wound.  Patient relates that while on his way to work the [employer] football game this weekend, his feet became wet from standing rain water.  He admits he did not change his shoes nor socks.  Once home he noted a color change to the previous wound site.  Patient relates that he began painting the site with Betadine. Denies seeing drainage.    12/14/2023 patient returns to clinic for follow-up of left foot wound.  Patient relates that he has been feeling pain to the lateral aspect of the left foot while in his dressing.  He denies excess ambulation but states that he has begun having restless legs while he is attempting to sleep and having abnormal sensations to the feet while he is sleeping.  He denies nausea, vomiting, fever, chills.    12/18/23: F/u left foot ulceration. Recently completed PO abx last week.     12/28/2023 patient returns to clinic for follow-up of left foot wound.  Patient relates that he no longer has been left foot pain nor has he been experiencing restless legs while he is attempting to sleep or having abnormal sensations to the feet while he is sleeping.  He has had arterial studies since our last encounter and has an appointment scheduled with his non [facility] cardiologist.  No new pedal complaints. He denies nausea, vomiting, fever, chills.    01/04/2024 patient returns to clinic for follow-up of high-risk feet with left foot wound.  He relates that he was able to keep dressing clean, dry, intact.  He denies pain.  He has no new pedal complaints.      PCP: Reyes Gusman MD    Date Last Seen by PCP:   Chief Complaint   Patient presents with    Diabetes Mellitus    Wound Check       Hemoglobin A1C   Date Value Ref Range Status    10/02/2023 7.7 (H) <5.7 % Final   03/29/2023 7.7 (H) <5.7 % Final     Hemoglobin A1c   Date Value Ref Range Status   07/07/2020 8.3 (H) <5.7 % of total Hgb Final     Comment:     For someone without known diabetes, a hemoglobin A1c  value of 6.5% or greater indicates that they may have   diabetes and this should be confirmed with a follow-up   test.    For someone with known diabetes, a value <7% indicates   that their diabetes is well controlled and a value   greater than or equal to 7% indicates suboptimal   control. A1c targets should be individualized based on   duration of diabetes, age, comorbid conditions, and   other considerations.    Currently, no consensus exists regarding use of  hemoglobin A1c for diagnosis of diabetes for children.       03/05/2020 7.9 (H) <5.7 % of total Hgb Final     Comment:     For someone without known diabetes, a hemoglobin A1c  value of 6.5% or greater indicates that they may have   diabetes and this should be confirmed with a follow-up   test.    For someone with known diabetes, a value <7% indicates   that their diabetes is well controlled and a value   greater than or equal to 7% indicates suboptimal   control. A1c targets should be individualized based on   duration of diabetes, age, comorbid conditions, and   other considerations.    Currently, no consensus exists regarding use of  hemoglobin A1c for diagnosis of diabetes for children.               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      atorvastatin (LIPITOR) 40 MG tablet       clopidogrel (PLAVIX) 75 mg tablet Take 75 mg by mouth once.       doxycycline (VIBRAMYCIN) 100 MG Cap Take 1 capsule (100 mg total) by mouth every  "12 (twelve) hours. 20 capsule 0    enalapril (VASOTEC) 5 MG tablet Take 5 mg by mouth once daily.       insulin NPH-insulin regular, 70/30, (NOVOLIN 70/30) 100 unit/mL (70-30) injection Inject 25 Units into the skin 2 (two) times daily.       insulin syringe-needle U-100 0.5 mL 31 gauge x 5/16 Syrg       insulin syringe-needle U-100 1/2 mL 30 gauge x 5/16 Syrg       linezolid (ZYVOX) 600 mg Tab Take 1 tablet (600 mg total) by mouth every 12 (twelve) hours. 20 tablet 0    metoprolol succinate (TOPROL-XL) 25 MG 24 hr tablet Take 25 mg by mouth once daily.      metoprolol tartrate (LOPRESSOR) 25 MG tablet Take 25 mg by mouth Daily.      PACERONE 100 mg Tab Take 100 mg by mouth once daily.       THINPRO INSULIN SYRINGE 0.5 mL 31 x 3/8" Syrg       VITAMIN D2 1,250 mcg (50,000 unit) capsule TAKE 1 CAPSULE BY MOUTH ONCE EVERY MONTH       No current facility-administered medications on file prior to visit.       Review of patient's allergies indicates:  No Known Allergies    Past Surgical History:   Procedure Laterality Date    APPENDECTOMY      GALLBLADDER SURGERY      TUMOR REMOVAL Left     foot       History reviewed. No pertinent family history.    Social History     Socioeconomic History    Marital status:    Tobacco Use    Smoking status: Former    Smokeless tobacco: Former         Review of Systems   Constitution: Negative for chills and fever.   Cardiovascular: Negative for claudication and leg swelling.   Respiratory: Negative for cough and shortness of breath.    Skin: Positive for dry skin, nail changes and poor wound healing. Negative for itching and rash.   Musculoskeletal: Positive for joint pain. Negative for falls, joint swelling and muscle weakness.   Gastrointestinal: Negative for diarrhea, nausea and vomiting.   Neurological: Positive for numbness and paresthesias. Negative for tremors and weakness.   Psychiatric/Behavioral: Negative for altered mental status and hallucinations.         " "  Objective:       Vitals:    01/04/24 1503   Weight: 67.6 kg (149 lb 0.5 oz)   Height: 5' 11" (1.803 m)   PainSc: 0-No pain       Physical Exam   Constitutional:  Non-toxic appearance. He does not have a sickly appearance. No distress.   Pt. is well-developed, well-nourished, appears stated age, in no acute distress, alert and oriented x 3. No evidence of depression, anxiety, or agitation. Calm, cooperative, and communicative. Appropriate interactions and affect.   Cardiovascular:   Pulses:       Dorsalis pedis pulses are 1+ on the right side, and 1+ on the left side.        Posterior tibial pulses are 1+ on the right side, and 1+ on the left side.   Dorsalis pedis and posterior tibial pulses are diminished Bilaterally. Toes are cool to touch. Feet are warm proximally.There is decreased digital hair. Skin is atrophic   Pulmonary/Chest: No respiratory distress.   Musculoskeletal:        Right ankle: No tenderness. No lateral malleolus, no medial malleolus, no AITFL, no CF ligament and no posterior TFL tenderness found. Achilles tendon exhibits no pain, no defect and normal Elias's test results.        Left ankle: No tenderness. No lateral malleolus, no medial malleolus, no AITFL, no CF ligament and no posterior TFL tenderness found. Achilles tendon exhibits no pain, no defect and normal Elias's test results.        Right foot: There is no tenderness and no bony tenderness.        Left foot: There is no tenderness and no bony tenderness.     Decreased stride, station of gait.  apropulsive toe off.  Increased angle and base of gait.    Patient has hammertoes of digits 2-5 bilateral partially reducible without symptom today.    Visible and palpable bunion without pain at dorsomedial 1st metatarsal head right and left.  Hallux abducted right and left partially reducible, tracks laterally without being track bound.  No ecchymosis, erythema, edema, or cardinal signs infection or signs of trauma same foot.    Visible " and palpable tailors bunion at dorsolateral 5th metatarsal head right and left.  5th digit is varus right and left partially reducible. No ecchymosis, erythema, edema, or cardinal signs infection.    Fat pad atrophy to heels and met heads bilateral     Lymphadenopathy:   No lymphatic streaking    Negative lymphadenopathy bilateral popliteal fossa and tarsal tunnel.     Neurological:   Enola-Tamanna 5.07 monofilamant testing is diminished Ryan feet. Decreased/absent vibratory sensation bilateral feet to 128Hz tuning fork.     Paresthesias, and hyperesthesia bilateral feet with no clearly identified trigger or source.    Skin: Skin is warm and dry. He is not diaphoretic. No cyanosis. No pallor. Nails show no clubbing.   Skin is thin, atrophic, xerotic    Toenails 1-5 bilaterally discolored/yellowed, dystrophic, brittle with subungual debris.    See wound below    Psychiatric: His mood appears not anxious. His affect is not inappropriate. His speech is not slurred. He is not combative. He is communicative. He is attentive.   Nursing note reviewed.    01/04/2024 12/28/2023    Sub 1st MTPJ        Lateral 5th MTPJ          12/18/23:              12/14/2023    Sub 1st metatarsophalangeal joint of the left foot        Lateral 5th metatarsophalangeal joint and digit of the left foot        Distal medial hallux of the left foot      Distal lesser digits of the left foot        12/05/2023        Post debridement    Ulcer location: sub 1st MTPJ of the left foot  Measurements : 1.2x0.6x0.2  cm   Signs of infection: local edema, erythema  Drainage: Sero-Sanguinous  Purulence: No  Crepitus/fluctuance: No  Periwound: Reddened, Macerated, Calloused  Base: Mixed Granular/Fibrotic  Depth: subcutaneous tissue  Probe to bone: No          09/13/2023          7/5/2023        5/24/2023            4/12/23          3/22/23            3/16/23          3/8/23    Photo did not load  Scant purulence and depth noted    2/28/23:  Post debridement            Assessment:       Encounter Diagnoses   Name Primary?    Type 2 diabetes mellitus with left diabetic foot ulcer Yes    Type II diabetes mellitus with neurological manifestations                      Plan:       Avni was seen today for diabetes mellitus and wound check.    Diagnoses and all orders for this visit:    Type 2 diabetes mellitus with left diabetic foot ulcer    Type II diabetes mellitus with neurological manifestations            Education about the prevention of limb loss.    Discussed wound healing cycle, skin integrity, ways to care for skin.Counseled patient on the effects of biomechanical pressure and blood glucose on healing. He verbalizes understanding that it can increase the chances of delayed healing and this prolonged exposure leads to infection or progression of infection which subsequently can result in loss of limb.    Reviewed arterial studies in detail.  Will defer to his cardiologist on if intervention is indicated. Wounds appears to pre progression toward healing.      All sites are cleansed of foreign material as much as possible and the base inspected for bone or abscess.  Base is granular and without bone nor joint exposure.     All sites improved.  Fragile epithelium in areas of pressure and so therefore high-risk of reopening    Dressings: cellerate  Offloading: football and darco shoe    Follow-up: 1-2 week [facility] if any signs of infection, such as fever, chills, sweats, increased redness or pain.    Short-term goals include maintaining good offloading and minimizing bioburden, promoting granulation and epithelialization to healing.  Long-term goals include keeping the wound healed by good offloading and medical management under the direction of internist.    Shoe inspection. Diabetic Foot Education. Patient reminded of the importance of good nutrition and blood sugar control to help prevent podiatric complications of  diabetes. Patient instructed on proper foot hygeine. We discussed wearing proper shoe gear, daily foot inspections, never walking without protective shoe gear, never putting sharp instruments to feet.        I spent a total of 32 minutes on the day of the visit.This includes face to face time and non-face to face time preparing to see the patient (eg, review of tests), obtaining and/or reviewing separately obtained history, documenting clinical information in the electronic or other health record, independently interpreting results and communicating results to the patient/family/caregiver, or care coordinator.

## 2024-01-18 ENCOUNTER — OFFICE VISIT (OUTPATIENT)
Dept: PODIATRY | Facility: CLINIC | Age: 79
End: 2024-01-18
Payer: MEDICARE

## 2024-01-18 VITALS — HEIGHT: 71 IN | BODY MASS INDEX: 20.87 KG/M2 | WEIGHT: 149.06 LBS

## 2024-01-18 DIAGNOSIS — E11.621 TYPE 2 DIABETES MELLITUS WITH LEFT DIABETIC FOOT ULCER: Primary | ICD-10-CM

## 2024-01-18 DIAGNOSIS — L97.529 TYPE 2 DIABETES MELLITUS WITH LEFT DIABETIC FOOT ULCER: Primary | ICD-10-CM

## 2024-01-18 DIAGNOSIS — E11.49 TYPE II DIABETES MELLITUS WITH NEUROLOGICAL MANIFESTATIONS: ICD-10-CM

## 2024-01-18 PROCEDURE — 99213 OFFICE O/P EST LOW 20 MIN: CPT | Mod: S$GLB,,, | Performed by: PODIATRIST

## 2024-01-18 PROCEDURE — 99999 PR PBB SHADOW E&M-EST. PATIENT-LVL IV: CPT | Mod: PBBFAC,,, | Performed by: PODIATRIST

## 2024-01-25 NOTE — PROGRESS NOTES
Subjective:      Patient ID: Avni Daniels is a 79 y.o. male.    Chief Complaint: Diabetes Mellitus and Wound Check    Avni is a 79 y.o. male who presents to the clinic for evaluation and treatment of high risk feet. Avni has a past medical history of Diabetes mellitus, type 2 and Hypertension.  Complains of painful left foot callus and elongated, thickened toenails aggravated by increased weight bearing, shoe gear, pressure.   This patient has documented high risk feet requiring routine maintenance secondary to diabetes mellitis and those secondary complications of diabetes, as mentioned..      2/28/23: Reports pain left foot ulceration, reports cleansing area with peroxide.     3/8/2023 patient discontinues used of peroxide and has attempted to care for foot as instructed    3/16/2023   Patient has left football dressing clean, dry, intact.  Patient denies pain. No new pedal complaints.     3/22/2023   Patient has left football dressing clean, dry, intact.  Patient denies pain. No new pedal complaints.     4/12/2023   Patient returns to clinic for follow up of left foot wound.  Since our las encounter patient was hospitalized.  Presents to clinic with slid on cushion and tennis shoes.  Patient denies pain. No new pedal complaints.     05/23/2023   Patient returns to clinic for follow up of left foot wound. He has been caring for feet as instructed.Patient denies pain. No new pedal complaints.     07/05/2023   Patient returns to clinic for follow up of left foot wound. He has been caring for feet as instructed. Patient denies pain. No new pedal complaints.     09/13/2023 patient returns to clinic for follow-up of high-risk feet with history of left foot wound.  Patient relates he has been using U pads regularly but states that they have been wearing down.  Patient relates he recently started doing a walking regimen.  Patient relates he believes his time emptying get new prescription shoes.  Patient relates that he  is not been painting the site with Betadine.  He relates some increased callus formation sub 1st metatarsophalangeal joint of the left foot but denies seeing any drainage.    12/05/2023 patient returns to clinic for follow-up of high-risk feet with history of left foot wound.  Patient relates that while on his way to work the [employer] football game this weekend, his feet became wet from standing rain water.  He admits he did not change his shoes nor socks.  Once home he noted a color change to the previous wound site.  Patient relates that he began painting the site with Betadine. Denies seeing drainage.    12/14/2023 patient returns to clinic for follow-up of left foot wound.  Patient relates that he has been feeling pain to the lateral aspect of the left foot while in his dressing.  He denies excess ambulation but states that he has begun having restless legs while he is attempting to sleep and having abnormal sensations to the feet while he is sleeping.  He denies nausea, vomiting, fever, chills.    12/18/23: F/u left foot ulceration. Recently completed PO abx last week.     12/28/2023 patient returns to clinic for follow-up of left foot wound.  Patient relates that he no longer has been left foot pain nor has he been experiencing restless legs while he is attempting to sleep or having abnormal sensations to the feet while he is sleeping.  He has had arterial studies since our last encounter and has an appointment scheduled with his non [facility] cardiologist.  No new pedal complaints. He denies nausea, vomiting, fever, chills.    01/04/2024 patient returns to clinic for follow-up of high-risk feet with left foot wound.  He relates that he was able to keep dressing clean, dry, intact.  He denies pain.  He has no new pedal complaints.    01/18/2024 patient returns to clinic for follow-up of high-risk feet with left foot wound.  He relates that he has been attempting to care for the foot as instructed but has noticed  some regression to an area on the plantar distal left hallux. He denies pain.        PCP: Reyes Gusman MD    Date Last Seen by PCP:   Chief Complaint   Patient presents with    Diabetes Mellitus    Wound Check       Hemoglobin A1C   Date Value Ref Range Status   10/02/2023 7.7 (H) <5.7 % Final   03/29/2023 7.7 (H) <5.7 % Final     Hemoglobin A1c   Date Value Ref Range Status   07/07/2020 8.3 (H) <5.7 % of total Hgb Final     Comment:     For someone without known diabetes, a hemoglobin A1c  value of 6.5% or greater indicates that they may have   diabetes and this should be confirmed with a follow-up   test.    For someone with known diabetes, a value <7% indicates   that their diabetes is well controlled and a value   greater than or equal to 7% indicates suboptimal   control. A1c targets should be individualized based on   duration of diabetes, age, comorbid conditions, and   other considerations.    Currently, no consensus exists regarding use of  hemoglobin A1c for diagnosis of diabetes for children.       03/05/2020 7.9 (H) <5.7 % of total Hgb Final     Comment:     For someone without known diabetes, a hemoglobin A1c  value of 6.5% or greater indicates that they may have   diabetes and this should be confirmed with a follow-up   test.    For someone with known diabetes, a value <7% indicates   that their diabetes is well controlled and a value   greater than or equal to 7% indicates suboptimal   control. A1c targets should be individualized based on   duration of diabetes, age, comorbid conditions, and   other considerations.    Currently, no consensus exists regarding use of  hemoglobin A1c for diagnosis of diabetes for children.               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      atorvastatin (LIPITOR) 40 MG tablet       clopidogrel (PLAVIX) 75 mg tablet Take 75 mg by mouth once.       doxycycline (VIBRAMYCIN) 100 MG Cap Take 1 capsule (100 mg total) by mouth every 12 (twelve) hours. 20 capsule 0    enalapril (VASOTEC) 5 MG tablet Take 5 mg by mouth once daily.       insulin NPH-insulin regular, 70/30, (NOVOLIN 70/30) 100 unit/mL (70-30) injection Inject 25 Units into the skin 2 (two) times daily.       insulin syringe-needle U-100 0.5 mL 31 gauge x 5/16 Syrg       insulin syringe-needle U-100 1/2 mL 30 gauge x 5/16 Syrg       linezolid (ZYVOX) 600 mg Tab Take 1 tablet (600 mg total) by mouth every 12 (twelve) hours. 20 tablet 0    metoprolol succinate (TOPROL-XL) 25 MG 24 hr tablet Take 25 mg by mouth once daily.      metoprolol tartrate (LOPRESSOR) 25 MG tablet Take 25 mg by mouth Daily.      PACERONE 100 mg Tab Take 100 mg by mouth once daily.       THINPRO INSULIN SYRINGE 0.5 mL 31 x 3/8" Syrg       VITAMIN D2 1,250 mcg (50,000 unit) capsule TAKE 1 CAPSULE BY MOUTH ONCE EVERY MONTH       No current facility-administered medications on file prior to visit.       Review of patient's allergies indicates:  No Known Allergies    Past Surgical History:   Procedure Laterality Date    APPENDECTOMY      GALLBLADDER SURGERY      TUMOR REMOVAL Left     foot       No family history on file.    Social History     Socioeconomic History    Marital status:    Tobacco Use    Smoking status: Former    Smokeless tobacco: Former         Review of Systems   Constitution: Negative for chills and fever.   Cardiovascular: Negative for claudication and leg swelling.   Respiratory: Negative for cough and shortness of breath.    Skin: Positive for dry skin, nail changes and poor wound healing. Negative for itching and rash.   Musculoskeletal: Positive for joint pain. Negative for falls, joint swelling and muscle weakness. " "  Gastrointestinal: Negative for diarrhea, nausea and vomiting.   Neurological: Positive for numbness and paresthesias. Negative for tremors and weakness.   Psychiatric/Behavioral: Negative for altered mental status and hallucinations.           Objective:       Vitals:    01/18/24 1451   Weight: 67.6 kg (149 lb 0.5 oz)   Height: 5' 11" (1.803 m)   PainSc:   2       Physical Exam   Constitutional:  Non-toxic appearance. He does not have a sickly appearance. No distress.   Pt. is well-developed, well-nourished, appears stated age, in no acute distress, alert and oriented x 3. No evidence of depression, anxiety, or agitation. Calm, cooperative, and communicative. Appropriate interactions and affect.   Cardiovascular:   Pulses:       Dorsalis pedis pulses are 1+ on the right side, and 1+ on the left side.        Posterior tibial pulses are 1+ on the right side, and 1+ on the left side.   Dorsalis pedis and posterior tibial pulses are diminished Bilaterally. Toes are cool to touch. Feet are warm proximally.There is decreased digital hair. Skin is atrophic   Pulmonary/Chest: No respiratory distress.   Musculoskeletal:        Right ankle: No tenderness. No lateral malleolus, no medial malleolus, no AITFL, no CF ligament and no posterior TFL tenderness found. Achilles tendon exhibits no pain, no defect and normal Elias's test results.        Left ankle: No tenderness. No lateral malleolus, no medial malleolus, no AITFL, no CF ligament and no posterior TFL tenderness found. Achilles tendon exhibits no pain, no defect and normal Elias's test results.        Right foot: There is no tenderness and no bony tenderness.        Left foot: There is no tenderness and no bony tenderness.     Decreased stride, station of gait.  apropulsive toe off.  Increased angle and base of gait.    Patient has hammertoes of digits 2-5 bilateral partially reducible without symptom today.    Visible and palpable bunion without pain at " dorsomedial 1st metatarsal head right and left.  Hallux abducted right and left partially reducible, tracks laterally without being track bound.  No ecchymosis, erythema, edema, or cardinal signs infection or signs of trauma same foot.    Visible and palpable tailors bunion at dorsolateral 5th metatarsal head right and left.  5th digit is varus right and left partially reducible. No ecchymosis, erythema, edema, or cardinal signs infection.    Fat pad atrophy to heels and met heads bilateral     Lymphadenopathy:   No lymphatic streaking    Negative lymphadenopathy bilateral popliteal fossa and tarsal tunnel.     Neurological:   Windsor-Tamanna 5.07 monofilamant testing is diminished Ryan feet. Decreased/absent vibratory sensation bilateral feet to 128Hz tuning fork.     Paresthesias, and hyperesthesia bilateral feet with no clearly identified trigger or source.    Skin: Skin is warm and dry. He is not diaphoretic. No cyanosis. No pallor. Nails show no clubbing.   Skin is thin, atrophic, xerotic    Toenails 1-5 bilaterally discolored/yellowed, dystrophic, brittle with subungual debris.    See wound below    Psychiatric: His mood appears not anxious. His affect is not inappropriate. His speech is not slurred. He is not combative. He is communicative. He is attentive.   Nursing note reviewed.    01/18/2024 01/04/2024 12/28/2023    Sub 1st MTPJ        Lateral 5th MTPJ          12/18/23:              12/14/2023    Sub 1st metatarsophalangeal joint of the left foot        Lateral 5th metatarsophalangeal joint and digit of the left foot        Distal medial hallux of the left foot      Distal lesser digits of the left foot        12/05/2023        Post debridement    Ulcer location: sub 1st MTPJ of the left foot  Measurements : 1.2x0.6x0.2  cm   Signs of infection: local edema, erythema  Drainage: Sero-Sanguinous  Purulence: No  Crepitus/fluctuance: No  Periwound: Reddened, Macerated,  Calloused  Base: Mixed Granular/Fibrotic  Depth: subcutaneous tissue  Probe to bone: No          09/13/2023          7/5/2023        5/24/2023            4/12/23          3/22/23            3/16/23          3/8/23    Photo did not load  Scant purulence and depth noted    2/28/23: Post debridement            Assessment:       Encounter Diagnoses   Name Primary?    Type 2 diabetes mellitus with left diabetic foot ulcer Yes    Type II diabetes mellitus with neurological manifestations                      Plan:       Avni was seen today for diabetes mellitus and wound check.    Diagnoses and all orders for this visit:    Type 2 diabetes mellitus with left diabetic foot ulcer    Type II diabetes mellitus with neurological manifestations            Education about the prevention of limb loss.    Discussed wound healing cycle, skin integrity, ways to care for skin.Counseled patient on the effects of biomechanical pressure and blood glucose on healing. He verbalizes understanding that it can increase the chances of delayed healing and this prolonged exposure leads to infection or progression of infection which subsequently can result in loss of limb.    Reviewed arterial studies in detail.  Will defer to his cardiologist on if intervention is indicated. Wounds appears to pre progression toward healing.      All sites are cleansed of foreign material as much as possible and the base inspected for bone or abscess.  Base is granular and without bone nor joint exposure.     New wound noted to the plantar distal left hallux    Dressings: cellerate  Offloading: football and darco shoe    Detailed wound care instructions discussed and dispensed on the use of Betadine BID    Follow-up: 1-2 week sbut should call [facility] immediately if any signs of infection, such as fever, chills, sweats, increased redness or pain.    Short-term goals include maintaining good offloading and minimizing bioburden, promoting granulation and  epithelialization to healing.  Long-term goals include keeping the wound healed by good offloading and medical management under the direction of internist.    Shoe inspection. Diabetic Foot Education. Patient reminded of the importance of good nutrition and blood sugar control to help prevent podiatric complications of diabetes. Patient instructed on proper foot hygeine. We discussed wearing proper shoe gear, daily foot inspections, never walking without protective shoe gear, never putting sharp instruments to feet.

## 2024-02-01 ENCOUNTER — OFFICE VISIT (OUTPATIENT)
Dept: PODIATRY | Facility: CLINIC | Age: 79
End: 2024-02-01
Payer: MEDICARE

## 2024-02-01 VITALS
BODY MASS INDEX: 20.87 KG/M2 | SYSTOLIC BLOOD PRESSURE: 116 MMHG | HEART RATE: 74 BPM | WEIGHT: 149.06 LBS | HEIGHT: 71 IN | DIASTOLIC BLOOD PRESSURE: 65 MMHG

## 2024-02-01 DIAGNOSIS — E11.49 TYPE II DIABETES MELLITUS WITH NEUROLOGICAL MANIFESTATIONS: ICD-10-CM

## 2024-02-01 DIAGNOSIS — E11.621 TYPE 2 DIABETES MELLITUS WITH LEFT DIABETIC FOOT ULCER: Primary | ICD-10-CM

## 2024-02-01 DIAGNOSIS — L90.9 PLANTAR FAT PAD ATROPHY: ICD-10-CM

## 2024-02-01 DIAGNOSIS — L97.512 FOOT ULCER, RIGHT, WITH FAT LAYER EXPOSED: ICD-10-CM

## 2024-02-01 DIAGNOSIS — L97.529 TYPE 2 DIABETES MELLITUS WITH LEFT DIABETIC FOOT ULCER: Primary | ICD-10-CM

## 2024-02-01 DIAGNOSIS — L97.522 TOE ULCER, LEFT, WITH FAT LAYER EXPOSED: ICD-10-CM

## 2024-02-01 PROCEDURE — 99999 PR PBB SHADOW E&M-EST. PATIENT-LVL IV: CPT | Mod: PBBFAC,,, | Performed by: PODIATRIST

## 2024-02-01 PROCEDURE — 99214 OFFICE O/P EST MOD 30 MIN: CPT | Mod: S$GLB,,, | Performed by: PODIATRIST

## 2024-02-02 NOTE — PROGRESS NOTES
Subjective:      Patient ID: Avni Daniels is a 79 y.o. male.    Chief Complaint: Diabetes Mellitus and Wound Check    Avni is a 79 y.o. male who presents to the clinic for evaluation and treatment of high risk feet. Avni has a past medical history of Diabetes mellitus, type 2 and Hypertension.  Complains of painful left foot callus and elongated, thickened toenails aggravated by increased weight bearing, shoe gear, pressure.   This patient has documented high risk feet requiring routine maintenance secondary to diabetes mellitis and those secondary complications of diabetes, as mentioned..      2/28/23: Reports pain left foot ulceration, reports cleansing area with peroxide.     3/8/2023 patient discontinues used of peroxide and has attempted to care for foot as instructed    3/16/2023   Patient has left football dressing clean, dry, intact.  Patient denies pain. No new pedal complaints.     3/22/2023   Patient has left football dressing clean, dry, intact.  Patient denies pain. No new pedal complaints.     4/12/2023   Patient returns to clinic for follow up of left foot wound.  Since our las encounter patient was hospitalized.  Presents to clinic with slid on cushion and tennis shoes.  Patient denies pain. No new pedal complaints.     05/23/2023   Patient returns to clinic for follow up of left foot wound. He has been caring for feet as instructed.Patient denies pain. No new pedal complaints.     07/05/2023   Patient returns to clinic for follow up of left foot wound. He has been caring for feet as instructed. Patient denies pain. No new pedal complaints.     09/13/2023 patient returns to clinic for follow-up of high-risk feet with history of left foot wound.  Patient relates he has been using U pads regularly but states that they have been wearing down.  Patient relates he recently started doing a walking regimen.  Patient relates he believes his time emptying get new prescription shoes.  Patient relates that he  is not been painting the site with Betadine.  He relates some increased callus formation sub 1st metatarsophalangeal joint of the left foot but denies seeing any drainage.    12/05/2023 patient returns to clinic for follow-up of high-risk feet with history of left foot wound.  Patient relates that while on his way to work the [employer] football game this weekend, his feet became wet from standing rain water.  He admits he did not change his shoes nor socks.  Once home he noted a color change to the previous wound site.  Patient relates that he began painting the site with Betadine. Denies seeing drainage.    12/14/2023 patient returns to clinic for follow-up of left foot wound.  Patient relates that he has been feeling pain to the lateral aspect of the left foot while in his dressing.  He denies excess ambulation but states that he has begun having restless legs while he is attempting to sleep and having abnormal sensations to the feet while he is sleeping.  He denies nausea, vomiting, fever, chills.    12/18/23: F/u left foot ulceration. Recently completed PO abx last week.     12/28/2023 patient returns to clinic for follow-up of left foot wound.  Patient relates that he no longer has been left foot pain nor has he been experiencing restless legs while he is attempting to sleep or having abnormal sensations to the feet while he is sleeping.  He has had arterial studies since our last encounter and has an appointment scheduled with his non [facility] cardiologist.  No new pedal complaints. He denies nausea, vomiting, fever, chills.    01/04/2024 patient returns to clinic for follow-up of high-risk feet with left foot wound.  He relates that he was able to keep dressing clean, dry, intact.  He denies pain.  He has no new pedal complaints.    01/18/2024 patient returns to clinic for follow-up of high-risk feet with left foot wound.  He relates that he has been attempting to care for the foot as instructed but has noticed  some regression to an area on the plantar distal left hallux. He denies pain.      02/01/2024 patient returns to clinic for follow-up of high-risk feet with left foot wound.  He relates that he has been attempting to care for the foot as instructed and relates that the left foot wound has not healed but now also relates that over the course of the last several days he has new onset right 1st metatarsophalangeal joint foot wound and bleeding to the right medial hallux nail border.  He can not recall an inciting event however with further conversation he states that he has mostly been in his home since he was last seen in clinic and in his home he wears a house slipper purchased by his daughter that he acquired in December.       PCP: Reyes Gusman MD    Date Last Seen by PCP:   Chief Complaint   Patient presents with    Diabetes Mellitus    Wound Check       Hemoglobin A1C   Date Value Ref Range Status   10/02/2023 7.7 (H) <5.7 % Final   03/29/2023 7.7 (H) <5.7 % Final     Hemoglobin A1c   Date Value Ref Range Status   07/07/2020 8.3 (H) <5.7 % of total Hgb Final     Comment:     For someone without known diabetes, a hemoglobin A1c  value of 6.5% or greater indicates that they may have   diabetes and this should be confirmed with a follow-up   test.    For someone with known diabetes, a value <7% indicates   that their diabetes is well controlled and a value   greater than or equal to 7% indicates suboptimal   control. A1c targets should be individualized based on   duration of diabetes, age, comorbid conditions, and   other considerations.    Currently, no consensus exists regarding use of  hemoglobin A1c for diagnosis of diabetes for children.       03/05/2020 7.9 (H) <5.7 % of total Hgb Final     Comment:     For someone without known diabetes, a hemoglobin A1c  value of 6.5% or greater indicates that they may have   diabetes and this should be confirmed with a follow-up   test.    For someone with known  "diabetes, a value <7% indicates   that their diabetes is well controlled and a value   greater than or equal to 7% indicates suboptimal   control. A1c targets should be individualized based on   duration of diabetes, age, comorbid conditions, and   other considerations.    Currently, no consensus exists regarding use of  hemoglobin A1c for diagnosis of diabetes for children.               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      atorvastatin (LIPITOR) 40 MG tablet       clopidogrel (PLAVIX) 75 mg tablet Take 75 mg by mouth once.       doxycycline (VIBRAMYCIN) 100 MG Cap Take 1 capsule (100 mg total) by mouth every 12 (twelve) hours. 20 capsule 0    enalapril (VASOTEC) 5 MG tablet Take 5 mg by mouth once daily.       insulin NPH-insulin regular, 70/30, (NOVOLIN 70/30) 100 unit/mL (70-30) injection Inject 25 Units into the skin 2 (two) times daily.       insulin syringe-needle U-100 0.5 mL 31 gauge x 5/16 Syrg       insulin syringe-needle U-100 1/2 mL 30 gauge x 5/16 Syrg       linezolid (ZYVOX) 600 mg Tab Take 1 tablet (600 mg total) by mouth every 12 (twelve) hours. 20 tablet 0    metoprolol succinate (TOPROL-XL) 25 MG 24 hr tablet Take 25 mg by mouth once daily.      metoprolol tartrate (LOPRESSOR) 25 MG tablet Take 25 mg by mouth Daily.      PACERONE 100 mg Tab Take 100 mg by mouth once daily.       THINPRO INSULIN SYRINGE 0.5 mL 31 x 3/8" Syrg       VITAMIN D2 1,250 mcg (50,000 unit) capsule TAKE 1 CAPSULE BY MOUTH ONCE EVERY MONTH       No current facility-administered medications on file prior to visit.       Review of patient's allergies indicates:  No Known Allergies    Past Surgical History:   Procedure " "Laterality Date    APPENDECTOMY      GALLBLADDER SURGERY      TUMOR REMOVAL Left     foot       No family history on file.    Social History     Socioeconomic History    Marital status:    Tobacco Use    Smoking status: Former    Smokeless tobacco: Former         Review of Systems   Constitution: Negative for chills and fever.   Cardiovascular: Negative for claudication and leg swelling.   Respiratory: Negative for cough and shortness of breath.    Skin: Positive for dry skin, nail changes and poor wound healing. Negative for itching and rash.   Musculoskeletal: Positive for joint pain. Negative for falls, joint swelling and muscle weakness.   Gastrointestinal: Negative for diarrhea, nausea and vomiting.   Neurological: Positive for numbness and paresthesias. Negative for tremors and weakness.   Psychiatric/Behavioral: Negative for altered mental status and hallucinations.           Objective:       Vitals:    02/01/24 1357   BP: 116/65   Pulse: 74   Weight: 67.6 kg (149 lb 0.5 oz)   Height: 5' 11" (1.803 m)   PainSc: 0-No pain       Physical Exam   Constitutional:  Non-toxic appearance. He does not have a sickly appearance. No distress.   Pt. is well-developed, well-nourished, appears stated age, in no acute distress, alert and oriented x 3. No evidence of depression, anxiety, or agitation. Calm, cooperative, and communicative. Appropriate interactions and affect.   Cardiovascular:   Pulses:       Dorsalis pedis pulses are 1+ on the right side, and 1+ on the left side.        Posterior tibial pulses are 1+ on the right side, and 1+ on the left side.   Dorsalis pedis and posterior tibial pulses are diminished Bilaterally. Toes are cool to touch. Feet are warm proximally.There is decreased digital hair. Skin is atrophic   Pulmonary/Chest: No respiratory distress.   Musculoskeletal:        Right ankle: No tenderness. No lateral malleolus, no medial malleolus, no AITFL, no CF ligament and no posterior TFL " tenderness found. Achilles tendon exhibits no pain, no defect and normal Elias's test results.        Left ankle: No tenderness. No lateral malleolus, no medial malleolus, no AITFL, no CF ligament and no posterior TFL tenderness found. Achilles tendon exhibits no pain, no defect and normal Elias's test results.        Right foot: There is no tenderness and no bony tenderness.        Left foot: There is no tenderness and no bony tenderness.     Decreased stride, station of gait.  apropulsive toe off.  Increased angle and base of gait.    Patient has hammertoes of digits 2-5 bilateral partially reducible without symptom today.    Visible and palpable bunion without pain at dorsomedial 1st metatarsal head right and left.  Hallux abducted right and left partially reducible, tracks laterally without being track bound.  No ecchymosis, erythema, edema, or cardinal signs infection or signs of trauma same foot.    Visible and palpable tailors bunion at dorsolateral 5th metatarsal head right and left.  5th digit is varus right and left partially reducible. No ecchymosis, erythema, edema, or cardinal signs infection.    Fat pad atrophy to heels and met heads bilateral     Lymphadenopathy:   No lymphatic streaking    Negative lymphadenopathy bilateral popliteal fossa and tarsal tunnel.     Neurological:   [place] 5.07 monofilamant testing is diminished Ryan feet. Decreased/absent vibratory sensation bilateral feet to 128Hz tuning fork.     Paresthesias, and hyperesthesia bilateral feet with no clearly identified trigger or source.    Skin: Skin is warm and dry. He is not diaphoretic. No cyanosis. No pallor. Nails show no clubbing.   Skin is thin, atrophic, xerotic    Toenails 1-5 bilaterally discolored/yellowed, dystrophic, brittle with subungual debris.    See wound below    Psychiatric: His mood appears not anxious. His affect is not inappropriate. His speech is not slurred. He is not combative. He is  communicative. He is attentive.   Nursing note reviewed.    02/01/2024    Thick fibrinous base to the distal left hallux wound with hyperkeratotic tissue periwound and localized edema and erythema          Ulcer location:  Sub 1st metatarsophalangeal joint of the right foot  Measurements :  0.7 x 0.4 x 0.2  cm   Signs of infection:  Local edema and erythema  Drainage: Sanguinous  Purulence: No  Crepitus/fluctuance: No  Periwound: Reddened, Calloused  Base: Mixed Granular/Fibrotic  Depth: subcutaneous tissue  Probe to bone: No        Dry blood to the medial hallux margin of the right foot           01/18/2024 01/04/2024 12/28/2023    Sub 1st MTPJ        Lateral 5th MTPJ          12/18/23:              12/14/2023    Sub 1st metatarsophalangeal joint of the left foot        Lateral 5th metatarsophalangeal joint and digit of the left foot        Distal medial hallux of the left foot      Distal lesser digits of the left foot        12/05/2023        Post debridement    Ulcer location: sub 1st MTPJ of the left foot  Measurements : 1.2x0.6x0.2  cm   Signs of infection: local edema, erythema  Drainage: Sero-Sanguinous  Purulence: No  Crepitus/fluctuance: No  Periwound: Reddened, Macerated, Calloused  Base: Mixed Granular/Fibrotic  Depth: subcutaneous tissue  Probe to bone: No          09/13/2023 7/5/2023          Assessment:       Encounter Diagnoses   Name Primary?    Type 2 diabetes mellitus with left diabetic foot ulcer Yes    Type II diabetes mellitus with neurological manifestations     Toe ulcer, left, with fat layer exposed     Foot ulcer, right, with fat layer exposed     Plantar fat pad atrophy                      Plan:       Avni was seen today for diabetes mellitus and wound check.    Diagnoses and all orders for this visit:    Type 2 diabetes mellitus with left diabetic foot ulcer    Type II diabetes mellitus with neurological manifestations    Toe ulcer, left, with fat  layer exposed    Foot ulcer, right, with fat layer exposed    Plantar fat pad atrophy            Education about the prevention of limb loss.    Discussed wound healing cycle, skin integrity, ways to care for skin.Counseled patient on the effects of biomechanical pressure and blood glucose on healing. He verbalizes understanding that it can increase the chances of delayed healing and this prolonged exposure leads to infection or progression of infection which subsequently can result in loss of limb.     All sites are cleansed of foreign material as much as possible and the base inspected for bone or abscess.  Bases are fibrogranular without bone or joint exposure.  Stagnation to the left hallux wound and new onset right foot wound.  During the course of very detailed conversation during our encounter I believe that his house shoe is the culprit to new wound and irritation to the toenail.  Explained to patient that the waned which he applies pressure to his foot, skin integrity, foot deformity make him vulnerable to getting wounds over and over again and therefore getting infections and therefore being high-risk for amputation.  Discussed what proper shoes even in the home for him would look and feel like.    Dressings:  Janette to all sites  Offloading: breathable footballs and darco shoes    Detailed wound care instructions discussed, he will begin home care on Monday.    Follow-up: 1-2 week sbut should call [facility] immediately if any signs of infection, such as fever, chills, sweats, increased redness or pain.    Short-term goals include maintaining good offloading and minimizing bioburden, promoting granulation and epithelialization to healing.  Long-term goals include keeping the wound healed by good offloading and medical management under the direction of internist.    Shoe inspection. Diabetic Foot Education. Patient reminded of the importance of good nutrition and blood sugar control to help prevent podiatric  complications of diabetes. Patient instructed on proper foot hygeine. We discussed wearing proper shoe gear, daily foot inspections, never walking without protective shoe gear, never putting sharp instruments to feet.      I spent a total of 37 minutes on the day of the visit.This includes face to face time and non-face to face time preparing to see the patient (eg, review of tests), obtaining and/or reviewing separately obtained history, documenting clinical information in the electronic or other health record, independently interpreting results and communicating results to the patient/family/caregiver, or care coordinator.

## 2024-02-15 ENCOUNTER — OFFICE VISIT (OUTPATIENT)
Dept: PODIATRY | Facility: CLINIC | Age: 79
End: 2024-02-15
Payer: MEDICARE

## 2024-02-15 VITALS
HEART RATE: 83 BPM | BODY MASS INDEX: 20.87 KG/M2 | HEIGHT: 71 IN | DIASTOLIC BLOOD PRESSURE: 63 MMHG | WEIGHT: 149.06 LBS | SYSTOLIC BLOOD PRESSURE: 112 MMHG

## 2024-02-15 DIAGNOSIS — L97.529 TYPE 2 DIABETES MELLITUS WITH LEFT DIABETIC FOOT ULCER: Primary | ICD-10-CM

## 2024-02-15 DIAGNOSIS — L97.522 TOE ULCER, LEFT, WITH FAT LAYER EXPOSED: ICD-10-CM

## 2024-02-15 DIAGNOSIS — E11.621 TYPE 2 DIABETES MELLITUS WITH LEFT DIABETIC FOOT ULCER: Primary | ICD-10-CM

## 2024-02-15 DIAGNOSIS — L90.9 PLANTAR FAT PAD ATROPHY: ICD-10-CM

## 2024-02-15 DIAGNOSIS — L97.512 FOOT ULCER, RIGHT, WITH FAT LAYER EXPOSED: ICD-10-CM

## 2024-02-15 DIAGNOSIS — E11.49 TYPE II DIABETES MELLITUS WITH NEUROLOGICAL MANIFESTATIONS: ICD-10-CM

## 2024-02-15 PROCEDURE — 99215 OFFICE O/P EST HI 40 MIN: CPT | Mod: S$GLB,,, | Performed by: PODIATRIST

## 2024-02-15 PROCEDURE — 99999 PR PBB SHADOW E&M-EST. PATIENT-LVL IV: CPT | Mod: PBBFAC,,, | Performed by: PODIATRIST

## 2024-02-18 NOTE — PROGRESS NOTES
Subjective:      Patient ID: Avni Daniels is a 79 y.o. male.    Chief Complaint: Diabetes Mellitus (10/2/23 Dr Gusman) and Foot Ulcer    Avni is a 79 y.o. male who presents to the clinic for evaluation and treatment of high risk feet. Avni has a past medical history of Diabetes mellitus, type 2 and Hypertension.  Complains of painful left foot callus and elongated, thickened toenails aggravated by increased weight bearing, shoe gear, pressure.   This patient has documented high risk feet requiring routine maintenance secondary to diabetes mellitis and those secondary complications of diabetes, as mentioned..      2/28/23: Reports pain left foot ulceration, reports cleansing area with peroxide.     3/8/2023 patient discontinues used of peroxide and has attempted to care for foot as instructed    3/16/2023   Patient has left football dressing clean, dry, intact.  Patient denies pain. No new pedal complaints.     3/22/2023   Patient has left football dressing clean, dry, intact.  Patient denies pain. No new pedal complaints.     4/12/2023   Patient returns to clinic for follow up of left foot wound.  Since our las encounter patient was hospitalized.  Presents to clinic with slid on cushion and tennis shoes.  Patient denies pain. No new pedal complaints.     05/23/2023   Patient returns to clinic for follow up of left foot wound. He has been caring for feet as instructed.Patient denies pain. No new pedal complaints.     07/05/2023   Patient returns to clinic for follow up of left foot wound. He has been caring for feet as instructed. Patient denies pain. No new pedal complaints.     09/13/2023 patient returns to clinic for follow-up of high-risk feet with history of left foot wound.  Patient relates he has been using U pads regularly but states that they have been wearing down.  Patient relates he recently started doing a walking regimen.  Patient relates he believes his time emptying get new prescription shoes.   Patient relates that he is not been painting the site with Betadine.  He relates some increased callus formation sub 1st metatarsophalangeal joint of the left foot but denies seeing any drainage.    12/05/2023 patient returns to clinic for follow-up of high-risk feet with history of left foot wound.  Patient relates that while on his way to work the [employer] football game this weekend, his feet became wet from standing rain water.  He admits he did not change his shoes nor socks.  Once home he noted a color change to the previous wound site.  Patient relates that he began painting the site with Betadine. Denies seeing drainage.    12/14/2023 patient returns to clinic for follow-up of left foot wound.  Patient relates that he has been feeling pain to the lateral aspect of the left foot while in his dressing.  He denies excess ambulation but states that he has begun having restless legs while he is attempting to sleep and having abnormal sensations to the feet while he is sleeping.  He denies nausea, vomiting, fever, chills.    12/18/23: F/u left foot ulceration. Recently completed PO abx last week.     12/28/2023 patient returns to clinic for follow-up of left foot wound.  Patient relates that he no longer has been left foot pain nor has he been experiencing restless legs while he is attempting to sleep or having abnormal sensations to the feet while he is sleeping.  He has had arterial studies since our last encounter and has an appointment scheduled with his non [facility] cardiologist.  No new pedal complaints. He denies nausea, vomiting, fever, chills.    01/04/2024 patient returns to clinic for follow-up of high-risk feet with left foot wound.  He relates that he was able to keep dressing clean, dry, intact.  He denies pain.  He has no new pedal complaints.    01/18/2024 patient returns to clinic for follow-up of high-risk feet with left foot wound.  He relates that he has been attempting to care for the foot as  instructed but has noticed some regression to an area on the plantar distal left hallux. He denies pain.      02/01/2024 patient returns to clinic for follow-up of high-risk feet with left foot wound.  He relates that he has been attempting to care for the foot as instructed and relates that the left foot wound has not healed but now also relates that over the course of the last several days he has new onset right 1st metatarsophalangeal joint foot wound and bleeding to the right medial hallux nail border.  He can not recall an inciting event however with further conversation he states that he has mostly been in his home since he was last seen in clinic and in his home he wears a house slipper purchased by his daughter that he acquired in December.     02/15/2024 patient returns to clinic for follow-up of high-risk feet with left foot wound.  He relates that he has been attempting to care for the foot as instructed but admits to the cutting and or pulling of skin that was snagging on sheets and socks and now he has a right foot wound and multiple left foot wounds.     PCP: Reyes Gusman MD    Date Last Seen by PCP:   Chief Complaint   Patient presents with    Diabetes Mellitus     10/2/23 Dr Gusman    Foot Ulcer       Hemoglobin A1C   Date Value Ref Range Status   10/02/2023 7.7 (H) <5.7 % Final   03/29/2023 7.7 (H) <5.7 % Final     Hemoglobin A1c   Date Value Ref Range Status   07/07/2020 8.3 (H) <5.7 % of total Hgb Final     Comment:     For someone without known diabetes, a hemoglobin A1c  value of 6.5% or greater indicates that they may have   diabetes and this should be confirmed with a follow-up   test.    For someone with known diabetes, a value <7% indicates   that their diabetes is well controlled and a value   greater than or equal to 7% indicates suboptimal   control. A1c targets should be individualized based on   duration of diabetes, age, comorbid conditions, and   other  considerations.    Currently, no consensus exists regarding use of  hemoglobin A1c for diagnosis of diabetes for children.       03/05/2020 7.9 (H) <5.7 % of total Hgb Final     Comment:     For someone without known diabetes, a hemoglobin A1c  value of 6.5% or greater indicates that they may have   diabetes and this should be confirmed with a follow-up   test.    For someone with known diabetes, a value <7% indicates   that their diabetes is well controlled and a value   greater than or equal to 7% indicates suboptimal   control. A1c targets should be individualized based on   duration of diabetes, age, comorbid conditions, and   other considerations.    Currently, no consensus exists regarding use of  hemoglobin A1c for diagnosis of diabetes for children.               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      atorvastatin (LIPITOR) 40 MG tablet       clopidogrel (PLAVIX) 75 mg tablet Take 75 mg by mouth once.       doxycycline (VIBRAMYCIN) 100 MG Cap Take 1 capsule (100 mg total) by mouth every 12 (twelve) hours. 20 capsule 0    enalapril (VASOTEC) 5 MG tablet Take 5 mg by mouth once daily.       insulin NPH-insulin regular, 70/30, (NOVOLIN 70/30) 100 unit/mL (70-30) injection Inject 25 Units into the skin 2 (two) times daily.       insulin syringe-needle U-100 0.5 mL 31 gauge x 5/16 Syrg       insulin syringe-needle U-100 1/2 mL 30 gauge x 5/16 Syrg       linezolid (ZYVOX) 600 mg Tab Take 1 tablet (600 mg total) by mouth every 12 (twelve) hours. 20 tablet 0    metoprolol succinate (TOPROL-XL) 25 MG 24 hr tablet Take 25 mg by mouth once daily.      metoprolol tartrate (LOPRESSOR) 25 MG tablet Take 25 mg by mouth  "Daily.      PACERONE 100 mg Tab Take 100 mg by mouth once daily.       THINPRO INSULIN SYRINGE 0.5 mL 31 x 3/8" Syrg       VITAMIN D2 1,250 mcg (50,000 unit) capsule TAKE 1 CAPSULE BY MOUTH ONCE EVERY MONTH       No current facility-administered medications on file prior to visit.       Review of patient's allergies indicates:  No Known Allergies    Past Surgical History:   Procedure Laterality Date    APPENDECTOMY      GALLBLADDER SURGERY      TUMOR REMOVAL Left     foot       History reviewed. No pertinent family history.    Social History     Socioeconomic History    Marital status:    Tobacco Use    Smoking status: Former    Smokeless tobacco: Former         Review of Systems   Constitution: Negative for chills and fever.   Cardiovascular: Negative for claudication and leg swelling.   Respiratory: Negative for cough and shortness of breath.    Skin: Positive for dry skin, nail changes and poor wound healing. Negative for itching and rash.   Musculoskeletal: Positive for joint pain. Negative for falls, joint swelling and muscle weakness.   Gastrointestinal: Negative for diarrhea, nausea and vomiting.   Neurological: Positive for numbness and paresthesias. Negative for tremors and weakness.   Psychiatric/Behavioral: Negative for altered mental status and hallucinations.           Objective:       Vitals:    02/15/24 1356   BP: 112/63   Pulse: 83   Weight: 67.6 kg (149 lb 0.5 oz)   Height: 5' 11" (1.803 m)   PainSc: 0-No pain       Physical Exam   Constitutional:  Non-toxic appearance. He does not have a sickly appearance. No distress.   Pt. is well-developed, well-nourished, appears stated age, in no acute distress, alert and oriented x 3. No evidence of depression, anxiety, or agitation. Calm, cooperative, and communicative. Appropriate interactions and affect.   Cardiovascular:   Pulses:       Dorsalis pedis pulses are 1+ on the right side, and 1+ on the left side.        Posterior tibial pulses are 1+ " on the right side, and 1+ on the left side.   Dorsalis pedis and posterior tibial pulses are diminished Bilaterally. Toes are cool to touch. Feet are warm proximally.There is decreased digital hair. Skin is atrophic   Pulmonary/Chest: No respiratory distress.   Musculoskeletal:        Right ankle: No tenderness. No lateral malleolus, no medial malleolus, no AITFL, no CF ligament and no posterior TFL tenderness found. Achilles tendon exhibits no pain, no defect and normal Elias's test results.        Left ankle: No tenderness. No lateral malleolus, no medial malleolus, no AITFL, no CF ligament and no posterior TFL tenderness found. Achilles tendon exhibits no pain, no defect and normal Elias's test results.        Right foot: There is no tenderness and no bony tenderness.        Left foot: There is no tenderness and no bony tenderness.     Decreased stride, station of gait.  apropulsive toe off.  Increased angle and base of gait.    Patient has hammertoes of digits 2-5 bilateral partially reducible without symptom today.    Visible and palpable bunion without pain at dorsomedial 1st metatarsal head right and left.  Hallux abducted right and left partially reducible, tracks laterally without being track bound.  No ecchymosis, erythema, edema, or cardinal signs infection or signs of trauma same foot.    Visible and palpable tailors bunion at dorsolateral 5th metatarsal head right and left.  5th digit is varus right and left partially reducible. No ecchymosis, erythema, edema, or cardinal signs infection.    Fat pad atrophy to heels and met heads bilateral     Lymphadenopathy:   No lymphatic streaking    Negative lymphadenopathy bilateral popliteal fossa and tarsal tunnel.     Neurological:   [place] 5.07 monofilamant testing is diminished Ryan feet. Decreased/absent vibratory sensation bilateral feet to 128Hz tuning fork.     Paresthesias, and hyperesthesia bilateral feet with no clearly identified  trigger or source.    Skin: Skin is warm and dry. He is not diaphoretic. No cyanosis. No pallor. Nails show no clubbing.   Skin is thin, atrophic, xerotic    Toenails 1-5 bilaterally discolored/yellowed, dystrophic, brittle with subungual debris.    See wound below    Psychiatric: His mood appears not anxious. His affect is not inappropriate. His speech is not slurred. He is not combative. He is communicative. He is attentive.   Nursing note reviewed.    02/15/2024    Sub 1st MTPJ, right          Left Foot                02/01/2024    Thick fibrinous base to the distal left hallux wound with hyperkeratotic tissue periwound and localized edema and erythema          Ulcer location:  Sub 1st metatarsophalangeal joint of the right foot  Measurements :  0.7 x 0.4 x 0.2  cm   Signs of infection:  Local edema and erythema  Drainage: Sanguinous  Purulence: No  Crepitus/fluctuance: No  Periwound: Reddened, Calloused  Base: Mixed Granular/Fibrotic  Depth: subcutaneous tissue  Probe to bone: No        Dry blood to the medial hallux margin of the right foot           01/18/2024 01/04/2024 12/28/2023    Sub 1st MTPJ        Lateral 5th MTPJ          12/18/23:              12/14/2023    Sub 1st metatarsophalangeal joint of the left foot        Lateral 5th metatarsophalangeal joint and digit of the left foot        Distal medial hallux of the left foot      Distal lesser digits of the left foot        12/05/2023        Post debridement    Ulcer location: sub 1st MTPJ of the left foot  Measurements : 1.2x0.6x0.2  cm   Signs of infection: local edema, erythema  Drainage: Sero-Sanguinous  Purulence: No  Crepitus/fluctuance: No  Periwound: Reddened, Macerated, Calloused  Base: Mixed Granular/Fibrotic  Depth: subcutaneous tissue  Probe to bone: No          09/13/2023 7/5/2023          Assessment:       Encounter Diagnoses   Name Primary?    Type 2 diabetes mellitus with left diabetic foot ulcer  Yes    Type II diabetes mellitus with neurological manifestations     Toe ulcer, left, with fat layer exposed     Foot ulcer, right, with fat layer exposed     Plantar fat pad atrophy            Plan:       Avni was seen today for diabetes mellitus and foot ulcer.    Diagnoses and all orders for this visit:    Type 2 diabetes mellitus with left diabetic foot ulcer    Type II diabetes mellitus with neurological manifestations    Toe ulcer, left, with fat layer exposed    Foot ulcer, right, with fat layer exposed    Plantar fat pad atrophy      Education about the prevention of limb loss.    Discussed wound healing cycle, skin integrity, ways to care for skin.Counseled patient on the effects of biomechanical pressure and blood glucose on healing. He verbalizes understanding that it can increase the chances of delayed healing and this prolonged exposure leads to infection or progression of infection which subsequently can result in loss of limb.     All sites are cleansed of foreign material as much as possible and the base inspected for bone or abscess.  Bases are fibrogranular without bone or joint exposure.  Strongly discouraged cutting, pulling, picking skin which has created new wounds. Discussed what proper shoes even in the home for him would look and feel like.    Dressings:  Janette to all sites  Offloading: breathable footballs and darco shoes    Detailed wound care instructions discussed, he will begin home care on Monday.    Follow-up: 1-2 week sbut should call [facility] immediately if any signs of infection, such as fever, chills, sweats, increased redness or pain.    Short-term goals include maintaining good offloading and minimizing bioburden, promoting granulation and epithelialization to healing.  Long-term goals include keeping the wound healed by good offloading and medical management under the direction of internist.    Shoe inspection. Diabetic Foot Education. Patient reminded of the importance of  good nutrition and blood sugar control to help prevent podiatric complications of diabetes. Patient instructed on proper foot hygeine. We discussed wearing proper shoe gear, daily foot inspections, never walking without protective shoe gear, never putting sharp instruments to feet.      I spent a total of 41 minutes on the day of the visit.This includes face to face time and non-face to face time preparing to see the patient (eg, review of tests), obtaining and/or reviewing separately obtained history, documenting clinical information in the electronic or other health record, independently interpreting results and communicating results to the patient/family/caregiver, or care coordinator. Reviewed all previous clinical images with patient

## 2024-02-22 ENCOUNTER — OFFICE VISIT (OUTPATIENT)
Dept: PODIATRY | Facility: CLINIC | Age: 79
End: 2024-02-22
Payer: MEDICARE

## 2024-02-22 VITALS — WEIGHT: 149.06 LBS | HEIGHT: 71 IN | BODY MASS INDEX: 20.87 KG/M2

## 2024-02-22 DIAGNOSIS — E11.621 TYPE 2 DIABETES MELLITUS WITH LEFT DIABETIC FOOT ULCER: Primary | ICD-10-CM

## 2024-02-22 DIAGNOSIS — L97.522 TOE ULCER, LEFT, WITH FAT LAYER EXPOSED: ICD-10-CM

## 2024-02-22 DIAGNOSIS — E11.49 TYPE II DIABETES MELLITUS WITH NEUROLOGICAL MANIFESTATIONS: ICD-10-CM

## 2024-02-22 DIAGNOSIS — L97.512 FOOT ULCER, RIGHT, WITH FAT LAYER EXPOSED: ICD-10-CM

## 2024-02-22 DIAGNOSIS — L97.529 TYPE 2 DIABETES MELLITUS WITH LEFT DIABETIC FOOT ULCER: Primary | ICD-10-CM

## 2024-02-22 DIAGNOSIS — L90.9 PLANTAR FAT PAD ATROPHY: ICD-10-CM

## 2024-02-22 PROCEDURE — 99213 OFFICE O/P EST LOW 20 MIN: CPT | Mod: S$GLB,,, | Performed by: PODIATRIST

## 2024-02-22 PROCEDURE — 99999 PR PBB SHADOW E&M-EST. PATIENT-LVL IV: CPT | Mod: PBBFAC,,, | Performed by: PODIATRIST

## 2024-02-29 ENCOUNTER — OFFICE VISIT (OUTPATIENT)
Dept: PODIATRY | Facility: CLINIC | Age: 79
End: 2024-02-29
Payer: MEDICARE

## 2024-02-29 DIAGNOSIS — E11.621 TYPE 2 DIABETES MELLITUS WITH LEFT DIABETIC FOOT ULCER: Primary | ICD-10-CM

## 2024-02-29 DIAGNOSIS — L97.512 FOOT ULCER, RIGHT, WITH FAT LAYER EXPOSED: ICD-10-CM

## 2024-02-29 DIAGNOSIS — L97.522 TOE ULCER, LEFT, WITH FAT LAYER EXPOSED: ICD-10-CM

## 2024-02-29 DIAGNOSIS — E11.49 TYPE II DIABETES MELLITUS WITH NEUROLOGICAL MANIFESTATIONS: ICD-10-CM

## 2024-02-29 DIAGNOSIS — L90.9 PLANTAR FAT PAD ATROPHY: ICD-10-CM

## 2024-02-29 DIAGNOSIS — L97.529 TYPE 2 DIABETES MELLITUS WITH LEFT DIABETIC FOOT ULCER: Primary | ICD-10-CM

## 2024-02-29 PROCEDURE — 99213 OFFICE O/P EST LOW 20 MIN: CPT | Mod: S$GLB,,, | Performed by: PODIATRIST

## 2024-02-29 PROCEDURE — 99999 PR PBB SHADOW E&M-EST. PATIENT-LVL II: CPT | Mod: PBBFAC,,, | Performed by: PODIATRIST

## 2024-02-29 NOTE — PROGRESS NOTES
Subjective:      Patient ID: Avni Daniels is a 79 y.o. male.    Chief Complaint: Diabetes Mellitus (10/2/23 Dr Gusman) and Foot Ulcer    Avni is a 79 y.o. male who presents to the clinic for evaluation and treatment of high risk feet. Avni has a past medical history of Diabetes mellitus, type 2 and Hypertension.  Complains of painful left foot callus and elongated, thickened toenails aggravated by increased weight bearing, shoe gear, pressure.   This patient has documented high risk feet requiring routine maintenance secondary to diabetes mellitis and those secondary complications of diabetes, as mentioned..      2/28/23: Reports pain left foot ulceration, reports cleansing area with peroxide.     3/8/2023 patient discontinues used of peroxide and has attempted to care for foot as instructed    3/16/2023   Patient has left football dressing clean, dry, intact.  Patient denies pain. No new pedal complaints.     3/22/2023   Patient has left football dressing clean, dry, intact.  Patient denies pain. No new pedal complaints.     4/12/2023   Patient returns to clinic for follow up of left foot wound.  Since our las encounter patient was hospitalized.  Presents to clinic with slid on cushion and tennis shoes.  Patient denies pain. No new pedal complaints.     05/23/2023   Patient returns to clinic for follow up of left foot wound. He has been caring for feet as instructed.Patient denies pain. No new pedal complaints.     07/05/2023   Patient returns to clinic for follow up of left foot wound. He has been caring for feet as instructed. Patient denies pain. No new pedal complaints.     09/13/2023 patient returns to clinic for follow-up of high-risk feet with history of left foot wound.  Patient relates he has been using U pads regularly but states that they have been wearing down.  Patient relates he recently started doing a walking regimen.  Patient relates he believes his time emptying get new prescription shoes.   Patient relates that he is not been painting the site with Betadine.  He relates some increased callus formation sub 1st metatarsophalangeal joint of the left foot but denies seeing any drainage.    12/05/2023 patient returns to clinic for follow-up of high-risk feet with history of left foot wound.  Patient relates that while on his way to work the [employer] football game this weekend, his feet became wet from standing rain water.  He admits he did not change his shoes nor socks.  Once home he noted a color change to the previous wound site.  Patient relates that he began painting the site with Betadine. Denies seeing drainage.    12/14/2023 patient returns to clinic for follow-up of left foot wound.  Patient relates that he has been feeling pain to the lateral aspect of the left foot while in his dressing.  He denies excess ambulation but states that he has begun having restless legs while he is attempting to sleep and having abnormal sensations to the feet while he is sleeping.  He denies nausea, vomiting, fever, chills.    12/18/23: F/u left foot ulceration. Recently completed PO abx last week.     12/28/2023 patient returns to clinic for follow-up of left foot wound.  Patient relates that he no longer has been left foot pain nor has he been experiencing restless legs while he is attempting to sleep or having abnormal sensations to the feet while he is sleeping.  He has had arterial studies since our last encounter and has an appointment scheduled with his non [facility] cardiologist.  No new pedal complaints. He denies nausea, vomiting, fever, chills.    01/04/2024 patient returns to clinic for follow-up of high-risk feet with left foot wound.  He relates that he was able to keep dressing clean, dry, intact.  He denies pain.  He has no new pedal complaints.    01/18/2024 patient returns to clinic for follow-up of high-risk feet with left foot wound.  He relates that he has been attempting to care for the foot as  instructed but has noticed some regression to an area on the plantar distal left hallux. He denies pain.      02/01/2024 patient returns to clinic for follow-up of high-risk feet with left foot wound.  He relates that he has been attempting to care for the foot as instructed and relates that the left foot wound has not healed but now also relates that over the course of the last several days he has new onset right 1st metatarsophalangeal joint foot wound and bleeding to the right medial hallux nail border.  He can not recall an inciting event however with further conversation he states that he has mostly been in his home since he was last seen in clinic and in his home he wears a house slipper purchased by his daughter that he acquired in December.     02/15/2024 patient returns to clinic for follow-up of high-risk feet with left foot wound.  He relates that he has been attempting to care for the foot as instructed but admits to the cutting and or pulling of skin that was snagging on sheets and socks and now he has a right foot wound and multiple left foot wounds.     02/22/2024 patient returns to clinic for follow-up of high-risk feet with left foot wound.  He relates that he has been attempting to care for the foot as instructed. No new pedal complaints       PCP: Reyes Gusman MD    Date Last Seen by PCP:   Chief Complaint   Patient presents with    Diabetes Mellitus     10/2/23 Dr Gusman    Foot Ulcer       Hemoglobin A1C   Date Value Ref Range Status   10/02/2023 7.7 (H) <5.7 % Final   03/29/2023 7.7 (H) <5.7 % Final     Hemoglobin A1c   Date Value Ref Range Status   07/07/2020 8.3 (H) <5.7 % of total Hgb Final     Comment:     For someone without known diabetes, a hemoglobin A1c  value of 6.5% or greater indicates that they may have   diabetes and this should be confirmed with a follow-up   test.    For someone with known diabetes, a value <7% indicates   that their diabetes is well controlled and a value    greater than or equal to 7% indicates suboptimal   control. A1c targets should be individualized based on   duration of diabetes, age, comorbid conditions, and   other considerations.    Currently, no consensus exists regarding use of  hemoglobin A1c for diagnosis of diabetes for children.       03/05/2020 7.9 (H) <5.7 % of total Hgb Final     Comment:     For someone without known diabetes, a hemoglobin A1c  value of 6.5% or greater indicates that they may have   diabetes and this should be confirmed with a follow-up   test.    For someone with known diabetes, a value <7% indicates   that their diabetes is well controlled and a value   greater than or equal to 7% indicates suboptimal   control. A1c targets should be individualized based on   duration of diabetes, age, comorbid conditions, and   other considerations.    Currently, no consensus exists regarding use of  hemoglobin A1c for diagnosis of diabetes for children.               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      atorvastatin (LIPITOR) 40 MG tablet       clopidogrel (PLAVIX) 75 mg tablet Take 75 mg by mouth once.       doxycycline (VIBRAMYCIN) 100 MG Cap Take 1 capsule (100 mg total) by mouth every 12 (twelve) hours. 20 capsule 0    enalapril (VASOTEC) 5 MG tablet Take 5 mg by mouth once daily.       insulin NPH-insulin regular, 70/30, (NOVOLIN 70/30) 100 unit/mL (70-30) injection Inject 25 Units into the skin 2 (two) times daily.       insulin syringe-needle U-100 0.5 mL 31 gauge x 5/16 Syrg       insulin syringe-needle U-100 1/2 mL 30 gauge x 5/16 Syrg       linezolid (ZYVOX) 600 mg Tab Take 1 tablet (600 mg total) by mouth every 12 (twelve) hours. 20  "tablet 0    metoprolol succinate (TOPROL-XL) 25 MG 24 hr tablet Take 25 mg by mouth once daily.      metoprolol tartrate (LOPRESSOR) 25 MG tablet Take 25 mg by mouth Daily.      PACERONE 100 mg Tab Take 100 mg by mouth once daily.       THINPRO INSULIN SYRINGE 0.5 mL 31 x 3/8" Syrg       VITAMIN D2 1,250 mcg (50,000 unit) capsule TAKE 1 CAPSULE BY MOUTH ONCE EVERY MONTH       No current facility-administered medications on file prior to visit.       Review of patient's allergies indicates:  No Known Allergies    Past Surgical History:   Procedure Laterality Date    APPENDECTOMY      GALLBLADDER SURGERY      TUMOR REMOVAL Left     foot       History reviewed. No pertinent family history.    Social History     Socioeconomic History    Marital status:    Tobacco Use    Smoking status: Former    Smokeless tobacco: Former         Review of Systems   Constitution: Negative for chills and fever.   Cardiovascular: Negative for claudication and leg swelling.   Respiratory: Negative for cough and shortness of breath.    Skin: Positive for dry skin, nail changes and poor wound healing. Negative for itching and rash.   Musculoskeletal: Positive for joint pain. Negative for falls, joint swelling and muscle weakness.   Gastrointestinal: Negative for diarrhea, nausea and vomiting.   Neurological: Positive for numbness and paresthesias. Negative for tremors and weakness.   Psychiatric/Behavioral: Negative for altered mental status and hallucinations.           Objective:       Vitals:    02/15/24 1356   BP: 112/63   Pulse: 83   Weight: 67.6 kg (149 lb 0.5 oz)   Height: 5' 11" (1.803 m)   PainSc: 0-No pain       Physical Exam   Constitutional:  Non-toxic appearance. He does not have a sickly appearance. No distress.   Pt. is well-developed, well-nourished, appears stated age, in no acute distress, alert and oriented x 3. No evidence of depression, anxiety, or agitation. Calm, cooperative, and communicative. Appropriate " interactions and affect.   Cardiovascular:   Pulses:       Dorsalis pedis pulses are 1+ on the right side, and 1+ on the left side.        Posterior tibial pulses are 1+ on the right side, and 1+ on the left side.   Dorsalis pedis and posterior tibial pulses are diminished Bilaterally. Toes are cool to touch. Feet are warm proximally.There is decreased digital hair. Skin is atrophic   Pulmonary/Chest: No respiratory distress.   Musculoskeletal:        Right ankle: No tenderness. No lateral malleolus, no medial malleolus, no AITFL, no CF ligament and no posterior TFL tenderness found. Achilles tendon exhibits no pain, no defect and normal Elias's test results.        Left ankle: No tenderness. No lateral malleolus, no medial malleolus, no AITFL, no CF ligament and no posterior TFL tenderness found. Achilles tendon exhibits no pain, no defect and normal Elias's test results.        Right foot: There is no tenderness and no bony tenderness.        Left foot: There is no tenderness and no bony tenderness.     Decreased stride, station of gait.  apropulsive toe off.  Increased angle and base of gait.    Patient has hammertoes of digits 2-5 bilateral partially reducible without symptom today.    Visible and palpable bunion without pain at dorsomedial 1st metatarsal head right and left.  Hallux abducted right and left partially reducible, tracks laterally without being track bound.  No ecchymosis, erythema, edema, or cardinal signs infection or signs of trauma same foot.    Visible and palpable tailors bunion at dorsolateral 5th metatarsal head right and left.  5th digit is varus right and left partially reducible. No ecchymosis, erythema, edema, or cardinal signs infection.    Fat pad atrophy to heels and met heads bilateral     Lymphadenopathy:   No lymphatic streaking    Negative lymphadenopathy bilateral popliteal fossa and tarsal tunnel.     Neurological:   [place] 5.07 monofilamant testing is  diminished Ryan feet. Decreased/absent vibratory sensation bilateral feet to 128Hz tuning fork.     Paresthesias, and hyperesthesia bilateral feet with no clearly identified trigger or source.    Skin: Skin is warm and dry. He is not diaphoretic. No cyanosis. No pallor. Nails show no clubbing.   Skin is thin, atrophic, xerotic    Toenails 1-5 bilaterally discolored/yellowed, dystrophic, brittle with subungual debris.    See wound below    Psychiatric: His mood appears not anxious. His affect is not inappropriate. His speech is not slurred. He is not combative. He is communicative. He is attentive.   Nursing note reviewed.      02/22/2024    Pictures not loading into chart            02/15/2024    Sub 1st MTPJ, right          Left Foot                02/01/2024    Thick fibrinous base to the distal left hallux wound with hyperkeratotic tissue periwound and localized edema and erythema          Ulcer location:  Sub 1st metatarsophalangeal joint of the right foot  Measurements :  0.7 x 0.4 x 0.2  cm   Signs of infection:  Local edema and erythema  Drainage: Sanguinous  Purulence: No  Crepitus/fluctuance: No  Periwound: Reddened, Calloused  Base: Mixed Granular/Fibrotic  Depth: subcutaneous tissue  Probe to bone: No        Dry blood to the medial hallux margin of the right foot           01/18/2024 01/04/2024 12/28/2023    Sub 1st MTPJ        Lateral 5th MTPJ          12/18/23:              12/14/2023    Sub 1st metatarsophalangeal joint of the left foot        Lateral 5th metatarsophalangeal joint and digit of the left foot        Distal medial hallux of the left foot      Distal lesser digits of the left foot        12/05/2023        Post debridement    Ulcer location: sub 1st MTPJ of the left foot  Measurements : 1.2x0.6x0.2  cm   Signs of infection: local edema, erythema  Drainage: Sero-Sanguinous  Purulence: No  Crepitus/fluctuance: No  Periwound: Reddened, Macerated, Calloused  Base:  Mixed Granular/Fibrotic  Depth: subcutaneous tissue  Probe to bone: No          09/13/2023 7/5/2023          Assessment:       Encounter Diagnoses   Name Primary?    Type 2 diabetes mellitus with left diabetic foot ulcer Yes    Type II diabetes mellitus with neurological manifestations     Toe ulcer, left, with fat layer exposed     Foot ulcer, right, with fat layer exposed     Plantar fat pad atrophy            Plan:       Avni was seen today for diabetes mellitus and foot ulcer.    Diagnoses and all orders for this visit:    Type 2 diabetes mellitus with left diabetic foot ulcer    Type II diabetes mellitus with neurological manifestations    Toe ulcer, left, with fat layer exposed    Foot ulcer, right, with fat layer exposed    Plantar fat pad atrophy      Education about the prevention of limb loss.    Discussed wound healing cycle, skin integrity, ways to care for skin.Counseled patient on the effects of biomechanical pressure and blood glucose on healing. He verbalizes understanding that it can increase the chances of delayed healing and this prolonged exposure leads to infection or progression of infection which subsequently can result in loss of limb.     All sites are cleansed of foreign material as much as possible and the base inspected for bone or abscess.  Bases are fibrogranular without bone or joint exposure.      Minor improvements     Dressings:  Janette to all sites  Offloading: breathable footballs and darco shoes    Detailed wound care instructions discussed, he will begin home care on Monday.    Follow-up: 1-2 weeks but should call [facility] immediately if any signs of infection, such as fever, chills, sweats, increased redness or pain.    Short-term goals include maintaining good offloading and minimizing bioburden, promoting granulation and epithelialization to healing.  Long-term goals include keeping the wound healed by good offloading and medical management under the direction of  internist.    Shoe inspection. Diabetic Foot Education. Patient reminded of the importance of good nutrition and blood sugar control to help prevent podiatric complications of diabetes. Patient instructed on proper foot hygeine. We discussed wearing proper shoe gear, daily foot inspections, never walking without protective shoe gear, never putting sharp instruments to feet.

## 2024-03-01 NOTE — PROGRESS NOTES
Subjective:      Patient ID: Avni Daniels is a 79 y.o. male.    Chief Complaint: No chief complaint on file.    Avni is a 79 y.o. male who presents to the clinic for evaluation and treatment of high risk feet. Avni has a past medical history of Diabetes mellitus, type 2 and Hypertension.  Complains of painful left foot callus and elongated, thickened toenails aggravated by increased weight bearing, shoe gear, pressure.   This patient has documented high risk feet requiring routine maintenance secondary to diabetes mellitis and those secondary complications of diabetes, as mentioned..      2/28/23: Reports pain left foot ulceration, reports cleansing area with peroxide.     3/8/2023 patient discontinues used of peroxide and has attempted to care for foot as instructed    3/16/2023   Patient has left football dressing clean, dry, intact.  Patient denies pain. No new pedal complaints.     3/22/2023   Patient has left football dressing clean, dry, intact.  Patient denies pain. No new pedal complaints.     4/12/2023   Patient returns to clinic for follow up of left foot wound.  Since our las encounter patient was hospitalized.  Presents to clinic with slid on cushion and tennis shoes.  Patient denies pain. No new pedal complaints.     05/23/2023   Patient returns to clinic for follow up of left foot wound. He has been caring for feet as instructed.Patient denies pain. No new pedal complaints.     07/05/2023   Patient returns to clinic for follow up of left foot wound. He has been caring for feet as instructed. Patient denies pain. No new pedal complaints.     09/13/2023 patient returns to clinic for follow-up of high-risk feet with history of left foot wound.  Patient relates he has been using U pads regularly but states that they have been wearing down.  Patient relates he recently started doing a walking regimen.  Patient relates he believes his time emptying get new prescription shoes.  Patient relates that he is  not been painting the site with Betadine.  He relates some increased callus formation sub 1st metatarsophalangeal joint of the left foot but denies seeing any drainage.    12/05/2023 patient returns to clinic for follow-up of high-risk feet with history of left foot wound.  Patient relates that while on his way to work the [employer] football game this weekend, his feet became wet from standing rain water.  He admits he did not change his shoes nor socks.  Once home he noted a color change to the previous wound site.  Patient relates that he began painting the site with Betadine. Denies seeing drainage.    12/14/2023 patient returns to clinic for follow-up of left foot wound.  Patient relates that he has been feeling pain to the lateral aspect of the left foot while in his dressing.  He denies excess ambulation but states that he has begun having restless legs while he is attempting to sleep and having abnormal sensations to the feet while he is sleeping.  He denies nausea, vomiting, fever, chills.    12/18/23: F/u left foot ulceration. Recently completed PO abx last week.     12/28/2023 patient returns to clinic for follow-up of left foot wound.  Patient relates that he no longer has been left foot pain nor has he been experiencing restless legs while he is attempting to sleep or having abnormal sensations to the feet while he is sleeping.  He has had arterial studies since our last encounter and has an appointment scheduled with his non [facility] cardiologist.  No new pedal complaints. He denies nausea, vomiting, fever, chills.    01/04/2024 patient returns to clinic for follow-up of high-risk feet with left foot wound.  He relates that he was able to keep dressing clean, dry, intact.  He denies pain.  He has no new pedal complaints.    01/18/2024 patient returns to clinic for follow-up of high-risk feet with left foot wound.  He relates that he has been attempting to care for the foot as instructed but has noticed some  regression to an area on the plantar distal left hallux. He denies pain.      02/01/2024 patient returns to clinic for follow-up of high-risk feet with left foot wound.  He relates that he has been attempting to care for the foot as instructed and relates that the left foot wound has not healed but now also relates that over the course of the last several days he has new onset right 1st metatarsophalangeal joint foot wound and bleeding to the right medial hallux nail border.  He can not recall an inciting event however with further conversation he states that he has mostly been in his home since he was last seen in clinic and in his home he wears a house slipper purchased by his daughter that he acquired in December.     02/15/2024 patient returns to clinic for follow-up of high-risk feet with left foot wound.  He relates that he has been attempting to care for the foot as instructed but admits to the cutting and or pulling of skin that was snagging on sheets and socks and now he has a right foot wound and multiple left foot wounds.     02/22/2024 patient returns to clinic for follow-up of high-risk feet with left foot wound.  He relates that he has been attempting to care for the foot as instructed. No new pedal complaints     02/29/2024 patient returns to clinic for follow-up of high-risk feet with left foot wound.  He relates that he has been attempting to care for the foot as instructed. No new pedal complaints     PCP: Reyes Gusman MD    Date Last Seen by PCP:   No chief complaint on file.      Hemoglobin A1C   Date Value Ref Range Status   10/02/2023 7.7 (H) <5.7 % Final   03/29/2023 7.7 (H) <5.7 % Final     Hemoglobin A1c   Date Value Ref Range Status   07/07/2020 8.3 (H) <5.7 % of total Hgb Final     Comment:     For someone without known diabetes, a hemoglobin A1c  value of 6.5% or greater indicates that they may have   diabetes and this should be confirmed with a follow-up   test.    For someone with  known diabetes, a value <7% indicates   that their diabetes is well controlled and a value   greater than or equal to 7% indicates suboptimal   control. A1c targets should be individualized based on   duration of diabetes, age, comorbid conditions, and   other considerations.    Currently, no consensus exists regarding use of  hemoglobin A1c for diagnosis of diabetes for children.       03/05/2020 7.9 (H) <5.7 % of total Hgb Final     Comment:     For someone without known diabetes, a hemoglobin A1c  value of 6.5% or greater indicates that they may have   diabetes and this should be confirmed with a follow-up   test.    For someone with known diabetes, a value <7% indicates   that their diabetes is well controlled and a value   greater than or equal to 7% indicates suboptimal   control. A1c targets should be individualized based on   duration of diabetes, age, comorbid conditions, and   other considerations.    Currently, no consensus exists regarding use of  hemoglobin A1c for diagnosis of diabetes for children.               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      atorvastatin (LIPITOR) 40 MG tablet       clopidogrel (PLAVIX) 75 mg tablet Take 75 mg by mouth once.       doxycycline (VIBRAMYCIN) 100 MG Cap Take 1 capsule (100 mg total) by mouth every 12 (twelve) hours. 20 capsule 0    enalapril (VASOTEC) 5 MG tablet Take 5 mg by mouth once daily.       insulin NPH-insulin regular, 70/30, (NOVOLIN 70/30) 100 unit/mL (70-30) injection Inject 25 Units into the skin 2 (two) times daily.       insulin syringe-needle U-100 0.5 mL 31 gauge x 5/16 Syrg       insulin syringe-needle U-100 1/2 mL 30 gauge x 5/16 Syrg        "linezolid (ZYVOX) 600 mg Tab Take 1 tablet (600 mg total) by mouth every 12 (twelve) hours. 20 tablet 0    metoprolol succinate (TOPROL-XL) 25 MG 24 hr tablet Take 25 mg by mouth once daily.      metoprolol tartrate (LOPRESSOR) 25 MG tablet Take 25 mg by mouth Daily.      PACERONE 100 mg Tab Take 100 mg by mouth once daily.       THINPRO INSULIN SYRINGE 0.5 mL 31 x 3/8" Syrg       VITAMIN D2 1,250 mcg (50,000 unit) capsule TAKE 1 CAPSULE BY MOUTH ONCE EVERY MONTH       No current facility-administered medications on file prior to visit.       Review of patient's allergies indicates:  No Known Allergies    Past Surgical History:   Procedure Laterality Date    APPENDECTOMY      GALLBLADDER SURGERY      TUMOR REMOVAL Left     foot       History reviewed. No pertinent family history.    Social History     Socioeconomic History    Marital status:    Tobacco Use    Smoking status: Former    Smokeless tobacco: Former         Review of Systems   Constitution: Negative for chills and fever.   Cardiovascular: Negative for claudication and leg swelling.   Respiratory: Negative for cough and shortness of breath.    Skin: Positive for dry skin, nail changes and poor wound healing. Negative for itching and rash.   Musculoskeletal: Positive for joint pain. Negative for falls, joint swelling and muscle weakness.   Gastrointestinal: Negative for diarrhea, nausea and vomiting.   Neurological: Positive for numbness and paresthesias. Negative for tremors and weakness.   Psychiatric/Behavioral: Negative for altered mental status and hallucinations.           Objective:       There were no vitals filed for this visit.      Physical Exam   Constitutional:  Non-toxic appearance. He does not have a sickly appearance. No distress.   Pt. is well-developed, well-nourished, appears stated age, in no acute distress, alert and oriented x 3. No evidence of depression, anxiety, or agitation. Calm, cooperative, and communicative. Appropriate " interactions and affect.   Cardiovascular:   Pulses:       Dorsalis pedis pulses are 1+ on the right side, and 1+ on the left side.        Posterior tibial pulses are 1+ on the right side, and 1+ on the left side.   Dorsalis pedis and posterior tibial pulses are diminished Bilaterally. Toes are cool to touch. Feet are warm proximally.There is decreased digital hair. Skin is atrophic   Pulmonary/Chest: No respiratory distress.   Musculoskeletal:        Right ankle: No tenderness. No lateral malleolus, no medial malleolus, no AITFL, no CF ligament and no posterior TFL tenderness found. Achilles tendon exhibits no pain, no defect and normal Elias's test results.        Left ankle: No tenderness. No lateral malleolus, no medial malleolus, no AITFL, no CF ligament and no posterior TFL tenderness found. Achilles tendon exhibits no pain, no defect and normal Elias's test results.        Right foot: There is no tenderness and no bony tenderness.        Left foot: There is no tenderness and no bony tenderness.     Decreased stride, station of gait.  apropulsive toe off.  Increased angle and base of gait.    Patient has hammertoes of digits 2-5 bilateral partially reducible without symptom today.    Visible and palpable bunion without pain at dorsomedial 1st metatarsal head right and left.  Hallux abducted right and left partially reducible, tracks laterally without being track bound.  No ecchymosis, erythema, edema, or cardinal signs infection or signs of trauma same foot.    Visible and palpable tailors bunion at dorsolateral 5th metatarsal head right and left.  5th digit is varus right and left partially reducible. No ecchymosis, erythema, edema, or cardinal signs infection.    Fat pad atrophy to heels and met heads bilateral     Lymphadenopathy:   No lymphatic streaking    Negative lymphadenopathy bilateral popliteal fossa and tarsal tunnel.     Neurological:   Albion-Tamanna 5.07 monofilamant testing is  diminished Ryan feet. Decreased/absent vibratory sensation bilateral feet to 128Hz tuning fork.     Paresthesias, and hyperesthesia bilateral feet with no clearly identified trigger or source.    Skin: Skin is warm and dry. He is not diaphoretic. No cyanosis. No pallor. Nails show no clubbing.   Skin is thin, atrophic, xerotic    Toenails 1-5 bilaterally discolored/yellowed, dystrophic, brittle with subungual debris.    See wound below    Psychiatric: His mood appears not anxious. His affect is not inappropriate. His speech is not slurred. He is not combative. He is communicative. He is attentive.   Nursing note reviewed.      02/29/2024    Right     Evidence of excess pressure to distal 2nd digit             Left            02/22/2024    Pictures not loading into chart            02/15/2024    Sub 1st MTPJ, right          Left Foot                02/01/2024    Thick fibrinous base to the distal left hallux wound with hyperkeratotic tissue periwound and localized edema and erythema          Ulcer location:  Sub 1st metatarsophalangeal joint of the right foot  Measurements :  0.7 x 0.4 x 0.2  cm   Signs of infection:  Local edema and erythema  Drainage: Sanguinous  Purulence: No  Crepitus/fluctuance: No  Periwound: Reddened, Calloused  Base: Mixed Granular/Fibrotic  Depth: subcutaneous tissue  Probe to bone: No        Dry blood to the medial hallux margin of the right foot           01/18/2024 01/04/2024 12/28/2023    Sub 1st MTPJ        Lateral 5th MTPJ          12/18/23:              12/14/2023    Sub 1st metatarsophalangeal joint of the left foot        Lateral 5th metatarsophalangeal joint and digit of the left foot        Distal medial hallux of the left foot      Distal lesser digits of the left foot        12/05/2023        Post debridement    Ulcer location: sub 1st MTPJ of the left foot  Measurements : 1.2x0.6x0.2  cm   Signs of infection: local edema, erythema  Drainage:  Sero-Sanguinous  Purulence: No  Crepitus/fluctuance: No  Periwound: Reddened, Macerated, Calloused  Base: Mixed Granular/Fibrotic  Depth: subcutaneous tissue  Probe to bone: No          09/13/2023 7/5/2023          Assessment:       Encounter Diagnoses   Name Primary?    Type 2 diabetes mellitus with left diabetic foot ulcer Yes    Type II diabetes mellitus with neurological manifestations     Toe ulcer, left, with fat layer exposed     Foot ulcer, right, with fat layer exposed     Plantar fat pad atrophy            Plan:       Diagnoses and all orders for this visit:    Type 2 diabetes mellitus with left diabetic foot ulcer    Type II diabetes mellitus with neurological manifestations    Toe ulcer, left, with fat layer exposed    Foot ulcer, right, with fat layer exposed    Plantar fat pad atrophy      Education about the prevention of limb loss.    Discussed wound healing cycle, skin integrity, ways to care for skin.Counseled patient on the effects of biomechanical pressure and blood glucose on healing. He verbalizes understanding that it can increase the chances of delayed healing and this prolonged exposure leads to infection or progression of infection which subsequently can result in loss of limb.     All sites are cleansed of foreign material as much as possible and the base inspected for bone or abscess.  Base of right foot wound is granular, left is fibrogranular without bone or joint exposure.      Improved    Dressings:  cellerate to all sites    I am concerned about pressure to right 2nd digit    Offloading: footballs and darco shoes    Follow-up: 1-2 weeks but should call [facility] immediately if any signs of infection, such as fever, chills, sweats, increased redness or pain.    Short-term goals include maintaining good offloading and minimizing bioburden, promoting granulation and epithelialization to healing.  Long-term goals include keeping the wound healed by good offloading and medical  management under the direction of internist.    Shoe inspection. Diabetic Foot Education. Patient reminded of the importance of good nutrition and blood sugar control to help prevent podiatric complications of diabetes. Patient instructed on proper foot hygeine. We discussed wearing proper shoe gear, daily foot inspections, never walking without protective shoe gear, never putting sharp instruments to feet.

## 2024-03-07 ENCOUNTER — OFFICE VISIT (OUTPATIENT)
Dept: PODIATRY | Facility: CLINIC | Age: 79
End: 2024-03-07
Payer: MEDICARE

## 2024-03-07 VITALS
DIASTOLIC BLOOD PRESSURE: 63 MMHG | HEART RATE: 103 BPM | HEIGHT: 71 IN | BODY MASS INDEX: 20.87 KG/M2 | WEIGHT: 149.06 LBS | SYSTOLIC BLOOD PRESSURE: 116 MMHG

## 2024-03-07 DIAGNOSIS — L97.512 FOOT ULCER, RIGHT, WITH FAT LAYER EXPOSED: ICD-10-CM

## 2024-03-07 DIAGNOSIS — L97.522 TOE ULCER, LEFT, WITH FAT LAYER EXPOSED: ICD-10-CM

## 2024-03-07 DIAGNOSIS — L90.9 PLANTAR FAT PAD ATROPHY: ICD-10-CM

## 2024-03-07 DIAGNOSIS — E11.49 TYPE II DIABETES MELLITUS WITH NEUROLOGICAL MANIFESTATIONS: ICD-10-CM

## 2024-03-07 DIAGNOSIS — L97.529 TYPE 2 DIABETES MELLITUS WITH LEFT DIABETIC FOOT ULCER: Primary | ICD-10-CM

## 2024-03-07 DIAGNOSIS — E11.621 TYPE 2 DIABETES MELLITUS WITH LEFT DIABETIC FOOT ULCER: Primary | ICD-10-CM

## 2024-03-07 PROCEDURE — 99999 PR PBB SHADOW E&M-EST. PATIENT-LVL IV: CPT | Mod: PBBFAC,,, | Performed by: PODIATRIST

## 2024-03-07 PROCEDURE — 99214 OFFICE O/P EST MOD 30 MIN: CPT | Mod: S$GLB,,, | Performed by: PODIATRIST

## 2024-03-08 NOTE — PROGRESS NOTES
Subjective:      Patient ID: Avni Daniels is a 79 y.o. male.    Chief Complaint: Diabetes Mellitus and Wound Check    Avni is a 79 y.o. male who presents to the clinic for evaluation and treatment of high risk feet. Avni has a past medical history of Diabetes mellitus, type 2 and Hypertension.  Complains of painful left foot callus and elongated, thickened toenails aggravated by increased weight bearing, shoe gear, pressure.   This patient has documented high risk feet requiring routine maintenance secondary to diabetes mellitis and those secondary complications of diabetes, as mentioned..      2/28/23: Reports pain left foot ulceration, reports cleansing area with peroxide.     3/8/2023 patient discontinues used of peroxide and has attempted to care for foot as instructed    3/16/2023   Patient has left football dressing clean, dry, intact.  Patient denies pain. No new pedal complaints.     3/22/2023   Patient has left football dressing clean, dry, intact.  Patient denies pain. No new pedal complaints.     4/12/2023   Patient returns to clinic for follow up of left foot wound.  Since our las encounter patient was hospitalized.  Presents to clinic with slid on cushion and tennis shoes.  Patient denies pain. No new pedal complaints.     05/23/2023   Patient returns to clinic for follow up of left foot wound. He has been caring for feet as instructed.Patient denies pain. No new pedal complaints.     07/05/2023   Patient returns to clinic for follow up of left foot wound. He has been caring for feet as instructed. Patient denies pain. No new pedal complaints.     09/13/2023 patient returns to clinic for follow-up of high-risk feet with history of left foot wound.  Patient relates he has been using U pads regularly but states that they have been wearing down.  Patient relates he recently started doing a walking regimen.  Patient relates he believes his time emptying get new prescription shoes.  Patient relates that he  is not been painting the site with Betadine.  He relates some increased callus formation sub 1st metatarsophalangeal joint of the left foot but denies seeing any drainage.    12/05/2023 patient returns to clinic for follow-up of high-risk feet with history of left foot wound.  Patient relates that while on his way to work the [employer] football game this weekend, his feet became wet from standing rain water.  He admits he did not change his shoes nor socks.  Once home he noted a color change to the previous wound site.  Patient relates that he began painting the site with Betadine. Denies seeing drainage.    12/14/2023 patient returns to clinic for follow-up of left foot wound.  Patient relates that he has been feeling pain to the lateral aspect of the left foot while in his dressing.  He denies excess ambulation but states that he has begun having restless legs while he is attempting to sleep and having abnormal sensations to the feet while he is sleeping.  He denies nausea, vomiting, fever, chills.    12/18/23: F/u left foot ulceration. Recently completed PO abx last week.     12/28/2023 patient returns to clinic for follow-up of left foot wound.  Patient relates that he no longer has been left foot pain nor has he been experiencing restless legs while he is attempting to sleep or having abnormal sensations to the feet while he is sleeping.  He has had arterial studies since our last encounter and has an appointment scheduled with his non [facility] cardiologist.  No new pedal complaints. He denies nausea, vomiting, fever, chills.    01/04/2024 patient returns to clinic for follow-up of high-risk feet with left foot wound.  He relates that he was able to keep dressing clean, dry, intact.  He denies pain.  He has no new pedal complaints.    01/18/2024 patient returns to clinic for follow-up of high-risk feet with left foot wound.  He relates that he has been attempting to care for the foot as instructed but has noticed  some regression to an area on the plantar distal left hallux. He denies pain.      02/01/2024 patient returns to clinic for follow-up of high-risk feet with left foot wound.  He relates that he has been attempting to care for the foot as instructed and relates that the left foot wound has not healed but now also relates that over the course of the last several days he has new onset right 1st metatarsophalangeal joint foot wound and bleeding to the right medial hallux nail border.  He can not recall an inciting event however with further conversation he states that he has mostly been in his home since he was last seen in clinic and in his home he wears a house slipper purchased by his daughter that he acquired in December.     02/15/2024 patient returns to clinic for follow-up of high-risk feet with left foot wound.  He relates that he has been attempting to care for the foot as instructed but admits to the cutting and or pulling of skin that was snagging on sheets and socks and now he has a right foot wound and multiple left foot wounds.     02/22/2024 patient returns to clinic for follow-up of high-risk feet with left foot wound.  He relates that he has been attempting to care for the foot as instructed. No new pedal complaints     02/29/2024 patient returns to clinic for follow-up of high-risk feet with left foot wound.  He relates that he has been attempting to care for the foot as instructed. No new pedal complaints     03/072024 patient returns to clinic for follow-up of high-risk feet with left foot wound.  He relates that he has been attempting to care for the foot as instructed. No new pedal complaints.  He relates slip/falls out of bed since our last encounter      PCP: Reyes Gusman MD    Date Last Seen by PCP:   Chief Complaint   Patient presents with    Diabetes Mellitus    Wound Check       Hemoglobin A1C   Date Value Ref Range Status   10/02/2023 7.7 (H) <5.7 % Final   03/29/2023 7.7 (H) <5.7 %  Final     Hemoglobin A1c   Date Value Ref Range Status   07/07/2020 8.3 (H) <5.7 % of total Hgb Final     Comment:     For someone without known diabetes, a hemoglobin A1c  value of 6.5% or greater indicates that they may have   diabetes and this should be confirmed with a follow-up   test.    For someone with known diabetes, a value <7% indicates   that their diabetes is well controlled and a value   greater than or equal to 7% indicates suboptimal   control. A1c targets should be individualized based on   duration of diabetes, age, comorbid conditions, and   other considerations.    Currently, no consensus exists regarding use of  hemoglobin A1c for diagnosis of diabetes for children.       03/05/2020 7.9 (H) <5.7 % of total Hgb Final     Comment:     For someone without known diabetes, a hemoglobin A1c  value of 6.5% or greater indicates that they may have   diabetes and this should be confirmed with a follow-up   test.    For someone with known diabetes, a value <7% indicates   that their diabetes is well controlled and a value   greater than or equal to 7% indicates suboptimal   control. A1c targets should be individualized based on   duration of diabetes, age, comorbid conditions, and   other considerations.    Currently, no consensus exists regarding use of  hemoglobin A1c for diagnosis of diabetes for children.               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      atorvastatin (LIPITOR) 40 MG tablet       clopidogrel (PLAVIX) 75 mg tablet Take 75 mg by mouth once.       doxycycline (VIBRAMYCIN) 100 MG Cap Take 1 capsule (100 mg total) by mouth every 12 (twelve) hours. 20 capsule 0    enalapril (VASOTEC) 5 MG  "tablet Take 5 mg by mouth once daily.       insulin NPH-insulin regular, 70/30, (NOVOLIN 70/30) 100 unit/mL (70-30) injection Inject 25 Units into the skin 2 (two) times daily.       insulin syringe-needle U-100 0.5 mL 31 gauge x 5/16 Syrg       insulin syringe-needle U-100 1/2 mL 30 gauge x 5/16 Syrg       linezolid (ZYVOX) 600 mg Tab Take 1 tablet (600 mg total) by mouth every 12 (twelve) hours. 20 tablet 0    metoprolol succinate (TOPROL-XL) 25 MG 24 hr tablet Take 25 mg by mouth once daily.      metoprolol tartrate (LOPRESSOR) 25 MG tablet Take 25 mg by mouth Daily.      PACERONE 100 mg Tab Take 100 mg by mouth once daily.       THINPRO INSULIN SYRINGE 0.5 mL 31 x 3/8" Syrg       VITAMIN D2 1,250 mcg (50,000 unit) capsule TAKE 1 CAPSULE BY MOUTH ONCE EVERY MONTH       No current facility-administered medications on file prior to visit.       Review of patient's allergies indicates:  No Known Allergies    Past Surgical History:   Procedure Laterality Date    APPENDECTOMY      GALLBLADDER SURGERY      TUMOR REMOVAL Left     foot       History reviewed. No pertinent family history.    Social History     Socioeconomic History    Marital status:    Tobacco Use    Smoking status: Former    Smokeless tobacco: Former         Review of Systems   Constitution: Negative for chills and fever.   Cardiovascular: Negative for claudication and leg swelling.   Respiratory: Negative for cough and shortness of breath.    Skin: Positive for dry skin, nail changes and poor wound healing. Negative for itching and rash.   Musculoskeletal: Positive for joint pain, fall. Negative for muscle weakness.   Gastrointestinal: Negative for diarrhea, nausea and vomiting.   Neurological: Positive for numbness and paresthesias. Negative for tremors and weakness.   Psychiatric/Behavioral: Negative for altered mental status and hallucinations.           Objective:       Vitals:    03/07/24 1446   BP: 116/63   Pulse: 103   Weight: 67.6 kg " "(149 lb 0.5 oz)   Height: 5' 11" (1.803 m)         Physical Exam   Constitutional:  Non-toxic appearance. He does not have a sickly appearance. No distress.   Pt. is well-developed, well-nourished, appears stated age, in no acute distress, alert and oriented x 3. No evidence of depression, anxiety, or agitation. Calm, cooperative, and communicative. Appropriate interactions and affect.   Cardiovascular:   Pulses:       Dorsalis pedis pulses are 1+ on the right side, and 1+ on the left side.        Posterior tibial pulses are 1+ on the right side, and 1+ on the left side.   Dorsalis pedis and posterior tibial pulses are diminished Bilaterally. Toes are cool to touch. Feet are warm proximally.There is decreased digital hair. Skin is atrophic   Pulmonary/Chest: No respiratory distress.   Musculoskeletal:        Right ankle: No tenderness. No lateral malleolus, no medial malleolus, no AITFL, no CF ligament and no posterior TFL tenderness found. Achilles tendon exhibits no pain, no defect and normal Elias's test results.        Left ankle: No tenderness. No lateral malleolus, no medial malleolus, no AITFL, no CF ligament and no posterior TFL tenderness found. Achilles tendon exhibits no pain, no defect and normal Elias's test results.        Right foot: There is no tenderness and no bony tenderness.        Left foot: There is no tenderness and no bony tenderness.     Decreased stride, station of gait.  apropulsive toe off.  Increased angle and base of gait.    Patient has hammertoes of digits 2-5 bilateral partially reducible without symptom today.    Visible and palpable bunion without pain at dorsomedial 1st metatarsal head right and left.  Hallux abducted right and left partially reducible, tracks laterally without being track bound.  No ecchymosis, erythema, edema, or cardinal signs infection or signs of trauma same foot.    Visible and palpable tailors bunion at dorsolateral 5th metatarsal head right and left. "  5th digit is varus right and left partially reducible. No ecchymosis, erythema, edema, or cardinal signs infection.    Fat pad atrophy to heels and met heads bilateral     Lymphadenopathy:   No lymphatic streaking    Negative lymphadenopathy bilateral popliteal fossa and tarsal tunnel.     Neurological:   [place] 5.07 monofilamant testing is diminished Ryan feet. Decreased/absent vibratory sensation bilateral feet to 128Hz tuning fork.     Paresthesias, and hyperesthesia bilateral feet with no clearly identified trigger or source.    Skin: Skin is warm and dry. He is not diaphoretic. No cyanosis. No pallor. Nails show no clubbing.   Skin is thin, atrophic, xerotic    Toenails 1-5 bilaterally discolored/yellowed, dystrophic, brittle with subungual debris.    See wound below    Psychiatric: His mood appears not anxious. His affect is not inappropriate. His speech is not slurred. He is not combative. He is communicative. He is attentive.   Nursing note reviewed.      03/07/2024 02/29/2024    Right     Evidence of excess pressure to distal 2nd digit             Left            02/22/2024    Pictures not loading into chart            02/15/2024    Sub 1st MTPJ, right          Left Foot                02/01/2024    Thick fibrinous base to the distal left hallux wound with hyperkeratotic tissue periwound and localized edema and erythema          Ulcer location:  Sub 1st metatarsophalangeal joint of the right foot  Measurements :  0.7 x 0.4 x 0.2  cm   Signs of infection:  Local edema and erythema  Drainage: Sanguinous  Purulence: No  Crepitus/fluctuance: No  Periwound: Reddened, Calloused  Base: Mixed Granular/Fibrotic  Depth: subcutaneous tissue  Probe to bone: No        Dry blood to the medial hallux margin of the right foot           01/18/2024 01/04/2024 12/28/2023    Sub 1st MTPJ        Lateral 5th MTPJ          12/18/23:              12/14/2023    Sub 1st  metatarsophalangeal joint of the left foot        Lateral 5th metatarsophalangeal joint and digit of the left foot        Distal medial hallux of the left foot      Distal lesser digits of the left foot        12/05/2023        Post debridement    Ulcer location: sub 1st MTPJ of the left foot  Measurements : 1.2x0.6x0.2  cm   Signs of infection: local edema, erythema  Drainage: Sero-Sanguinous  Purulence: No  Crepitus/fluctuance: No  Periwound: Reddened, Macerated, Calloused  Base: Mixed Granular/Fibrotic  Depth: subcutaneous tissue  Probe to bone: No          09/13/2023 7/5/2023          Assessment:       Encounter Diagnoses   Name Primary?    Type 2 diabetes mellitus with left diabetic foot ulcer Yes    Type II diabetes mellitus with neurological manifestations     Toe ulcer, left, with fat layer exposed     Foot ulcer, right, with fat layer exposed     Plantar fat pad atrophy            Plan:       Avni was seen today for diabetes mellitus and wound check.    Diagnoses and all orders for this visit:    Type 2 diabetes mellitus with left diabetic foot ulcer    Type II diabetes mellitus with neurological manifestations    Toe ulcer, left, with fat layer exposed    Foot ulcer, right, with fat layer exposed    Plantar fat pad atrophy      Education about the prevention of limb loss.    Discussed wound healing cycle, skin integrity, ways to care for skin.Counseled patient on the effects of biomechanical pressure and blood glucose on healing. He verbalizes understanding that it can increase the chances of delayed healing and this prolonged exposure leads to infection or progression of infection which subsequently can result in loss of limb.     All sites are cleansed of foreign material as much as possible and the base inspected for bone or abscess.  Base of right foot wound is thin epithelium, left is granular without bone or joint exposure.      Improved    Dressings:  cellerate to all sites    Offloading:  football left and darco shoes    Follow-up: 1-2 weeks but should call [facility] immediately if any signs of infection, such as fever, chills, sweats, increased redness or pain.    Short-term goals include maintaining good offloading and minimizing bioburden, promoting granulation and epithelialization to healing.  Long-term goals include keeping the wound healed by good offloading and medical management under the direction of internist.    Shoe inspection. Diabetic Foot Education. Patient reminded of the importance of good nutrition and blood sugar control to help prevent podiatric complications of diabetes. Patient instructed on proper foot hygeine. We discussed wearing proper shoe gear, daily foot inspections, never walking without protective shoe gear, never putting sharp instruments to feet.      I spent a total of 31 minutes on the day of the visit.This includes face to face time and non-face to face time preparing to see the patient (eg, review of tests), obtaining and/or reviewing separately obtained history, documenting clinical information in the electronic or other health record, independently interpreting results and communicating results to the patient/family/caregiver, or care coordinator.

## 2024-03-14 ENCOUNTER — OFFICE VISIT (OUTPATIENT)
Dept: PODIATRY | Facility: CLINIC | Age: 79
End: 2024-03-14
Payer: MEDICARE

## 2024-03-14 VITALS
DIASTOLIC BLOOD PRESSURE: 72 MMHG | BODY MASS INDEX: 20.87 KG/M2 | HEIGHT: 71 IN | WEIGHT: 149.06 LBS | HEART RATE: 85 BPM | SYSTOLIC BLOOD PRESSURE: 124 MMHG

## 2024-03-14 DIAGNOSIS — L90.9 PLANTAR FAT PAD ATROPHY: ICD-10-CM

## 2024-03-14 DIAGNOSIS — L97.522 TOE ULCER, LEFT, WITH FAT LAYER EXPOSED: ICD-10-CM

## 2024-03-14 DIAGNOSIS — E11.49 TYPE II DIABETES MELLITUS WITH NEUROLOGICAL MANIFESTATIONS: ICD-10-CM

## 2024-03-14 DIAGNOSIS — E11.621 TYPE 2 DIABETES MELLITUS WITH LEFT DIABETIC FOOT ULCER: Primary | ICD-10-CM

## 2024-03-14 DIAGNOSIS — L97.529 TYPE 2 DIABETES MELLITUS WITH LEFT DIABETIC FOOT ULCER: Primary | ICD-10-CM

## 2024-03-14 PROCEDURE — 99213 OFFICE O/P EST LOW 20 MIN: CPT | Mod: S$GLB,,, | Performed by: PODIATRIST

## 2024-03-14 PROCEDURE — 99999 PR PBB SHADOW E&M-EST. PATIENT-LVL III: CPT | Mod: PBBFAC,,, | Performed by: PODIATRIST

## 2024-03-18 NOTE — PROGRESS NOTES
Subjective:      Patient ID: Avni Daniels is a 79 y.o. male.    Chief Complaint: Diabetes Mellitus (left foot callus and elongated, thickened toenails) and Wound Check    Avni is a 79 y.o. male who presents to the clinic for evaluation and treatment of high risk feet. Avni has a past medical history of Diabetes mellitus, type 2 and Hypertension.  Complains of painful left foot callus and elongated, thickened toenails aggravated by increased weight bearing, shoe gear, pressure.   This patient has documented high risk feet requiring routine maintenance secondary to diabetes mellitis and those secondary complications of diabetes, as mentioned..      2/28/23: Reports pain left foot ulceration, reports cleansing area with peroxide.     3/8/2023 patient discontinues used of peroxide and has attempted to care for foot as instructed    3/16/2023   Patient has left football dressing clean, dry, intact.  Patient denies pain. No new pedal complaints.     3/22/2023   Patient has left football dressing clean, dry, intact.  Patient denies pain. No new pedal complaints.     4/12/2023   Patient returns to clinic for follow up of left foot wound.  Since our las encounter patient was hospitalized.  Presents to clinic with slid on cushion and tennis shoes.  Patient denies pain. No new pedal complaints.     05/23/2023   Patient returns to clinic for follow up of left foot wound. He has been caring for feet as instructed.Patient denies pain. No new pedal complaints.     07/05/2023   Patient returns to clinic for follow up of left foot wound. He has been caring for feet as instructed. Patient denies pain. No new pedal complaints.     09/13/2023 patient returns to clinic for follow-up of high-risk feet with history of left foot wound.  Patient relates he has been using U pads regularly but states that they have been wearing down.  Patient relates he recently started doing a walking regimen.  Patient relates he believes his time emptying  get new prescription shoes.  Patient relates that he is not been painting the site with Betadine.  He relates some increased callus formation sub 1st metatarsophalangeal joint of the left foot but denies seeing any drainage.    12/05/2023 patient returns to clinic for follow-up of high-risk feet with history of left foot wound.  Patient relates that while on his way to work the [employer] football game this weekend, his feet became wet from standing rain water.  He admits he did not change his shoes nor socks.  Once home he noted a color change to the previous wound site.  Patient relates that he began painting the site with Betadine. Denies seeing drainage.    12/14/2023 patient returns to clinic for follow-up of left foot wound.  Patient relates that he has been feeling pain to the lateral aspect of the left foot while in his dressing.  He denies excess ambulation but states that he has begun having restless legs while he is attempting to sleep and having abnormal sensations to the feet while he is sleeping.  He denies nausea, vomiting, fever, chills.    12/18/23: F/u left foot ulceration. Recently completed PO abx last week.     12/28/2023 patient returns to clinic for follow-up of left foot wound.  Patient relates that he no longer has been left foot pain nor has he been experiencing restless legs while he is attempting to sleep or having abnormal sensations to the feet while he is sleeping.  He has had arterial studies since our last encounter and has an appointment scheduled with his non [facility] cardiologist.  No new pedal complaints. He denies nausea, vomiting, fever, chills.    01/04/2024 patient returns to clinic for follow-up of high-risk feet with left foot wound.  He relates that he was able to keep dressing clean, dry, intact.  He denies pain.  He has no new pedal complaints.    01/18/2024 patient returns to clinic for follow-up of high-risk feet with left foot wound.  He relates that he has been attempting  to care for the foot as instructed but has noticed some regression to an area on the plantar distal left hallux. He denies pain.      02/01/2024 patient returns to clinic for follow-up of high-risk feet with left foot wound.  He relates that he has been attempting to care for the foot as instructed and relates that the left foot wound has not healed but now also relates that over the course of the last several days he has new onset right 1st metatarsophalangeal joint foot wound and bleeding to the right medial hallux nail border.  He can not recall an inciting event however with further conversation he states that he has mostly been in his home since he was last seen in clinic and in his home he wears a house slipper purchased by his daughter that he acquired in December.     02/15/2024 patient returns to clinic for follow-up of high-risk feet with left foot wound.  He relates that he has been attempting to care for the foot as instructed but admits to the cutting and or pulling of skin that was snagging on sheets and socks and now he has a right foot wound and multiple left foot wounds.     02/22/2024 patient returns to clinic for follow-up of high-risk feet with left foot wound.  He relates that he has been attempting to care for the foot as instructed. No new pedal complaints     02/29/2024 patient returns to clinic for follow-up of high-risk feet with left foot wound.  He relates that he has been attempting to care for the foot as instructed. No new pedal complaints     03/072024 patient returns to clinic for follow-up of high-risk feet with left foot wound.  He relates that he has been attempting to care for the foot as instructed. No new pedal complaints.  He relates slip/falls out of bed since our last encounter    03/14/2024 patient returns to clinic for follow-up of high-risk feet with left foot wound.  He relates that he has been attempting to care for the right foot as instructed and kept left foot  "dressing intact.  No new pedal complaints.      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      atorvastatin (LIPITOR) 40 MG tablet       clopidogrel (PLAVIX) 75 mg tablet Take 75 mg by mouth once.       doxycycline (VIBRAMYCIN) 100 MG Cap Take 1 capsule (100 mg total) by mouth every 12 (twelve) hours. 20 capsule 0    enalapril (VASOTEC) 5 MG tablet Take 5 mg by mouth once daily.       insulin NPH-insulin regular, 70/30, (NOVOLIN 70/30) 100 unit/mL (70-30) injection Inject 25 Units into the skin 2 (two) times daily.       insulin syringe-needle U-100 0.5 mL 31 gauge x 5/16 Syrg       insulin syringe-needle U-100 1/2 mL 30 gauge x 5/16 Syrg       linezolid (ZYVOX) 600 mg Tab Take 1 tablet (600 mg total) by mouth every 12 (twelve) hours. 20 tablet 0    metoprolol succinate (TOPROL-XL) 25 MG 24 hr tablet Take 25 mg by mouth once daily.      metoprolol tartrate (LOPRESSOR) 25 MG tablet Take 25 mg by mouth Daily.      PACERONE 100 mg Tab Take 100 mg by mouth once daily.       THINPRO INSULIN SYRINGE 0.5 mL 31 x 3/8" Syrg       VITAMIN D2 1,250 mcg (50,000 unit) capsule TAKE 1 CAPSULE BY MOUTH ONCE EVERY MONTH       No current facility-administered medications on file prior to visit.       Review of patient's allergies indicates:  No Known Allergies    Past Surgical History:   Procedure " "Laterality Date    APPENDECTOMY      GALLBLADDER SURGERY      TUMOR REMOVAL Left     foot       History reviewed. No pertinent family history.    Social History     Socioeconomic History    Marital status:    Tobacco Use    Smoking status: Former    Smokeless tobacco: Former         Review of Systems   Constitution: Negative for chills and fever.   Cardiovascular: Negative for claudication and leg swelling.   Respiratory: Negative for cough and shortness of breath.    Skin: Positive for dry skin, nail changes and poor wound healing. Negative for itching and rash.   Musculoskeletal: Positive for joint pain, fall. Negative for muscle weakness.   Gastrointestinal: Negative for diarrhea, nausea and vomiting.   Neurological: Positive for numbness and paresthesias. Negative for tremors and weakness.   Psychiatric/Behavioral: Negative for altered mental status and hallucinations.           Objective:       Vitals:    03/14/24 1502   BP: 124/72   Pulse: 85   Weight: 67.6 kg (149 lb 0.5 oz)   Height: 5' 11" (1.803 m)   PainSc: 0-No pain         Physical Exam   Constitutional:  Non-toxic appearance. He does not have a sickly appearance. No distress.   Pt. is well-developed, well-nourished, appears stated age, in no acute distress, alert and oriented x 3. No evidence of depression, anxiety, or agitation. Calm, cooperative, and communicative. Appropriate interactions and affect.   Cardiovascular:   Pulses:       Dorsalis pedis pulses are 1+ on the right side, and 1+ on the left side.        Posterior tibial pulses are 1+ on the right side, and 1+ on the left side.   Dorsalis pedis and posterior tibial pulses are diminished Bilaterally. Toes are cool to touch. Feet are warm proximally.There is decreased digital hair. Skin is atrophic   Pulmonary/Chest: No respiratory distress.   Musculoskeletal:        Right ankle: No tenderness. No lateral malleolus, no medial malleolus, no AITFL, no CF ligament and no posterior TFL " tenderness found. Achilles tendon exhibits no pain, no defect and normal Elias's test results.        Left ankle: No tenderness. No lateral malleolus, no medial malleolus, no AITFL, no CF ligament and no posterior TFL tenderness found. Achilles tendon exhibits no pain, no defect and normal Elias's test results.        Right foot: There is no tenderness and no bony tenderness.        Left foot: There is no tenderness and no bony tenderness.     Decreased stride, station of gait.  apropulsive toe off.  Increased angle and base of gait.    Patient has hammertoes of digits 2-5 bilateral partially reducible without symptom today.    Visible and palpable bunion without pain at dorsomedial 1st metatarsal head right and left.  Hallux abducted right and left partially reducible, tracks laterally without being track bound.  No ecchymosis, erythema, edema, or cardinal signs infection or signs of trauma same foot.    Visible and palpable tailors bunion at dorsolateral 5th metatarsal head right and left.  5th digit is varus right and left partially reducible. No ecchymosis, erythema, edema, or cardinal signs infection.    Fat pad atrophy to heels and met heads bilateral     Lymphadenopathy:   No lymphatic streaking    Negative lymphadenopathy bilateral popliteal fossa and tarsal tunnel.     Neurological:   [place]-Tamanna 5.07 monofilamant testing is diminished Ryan feet. Decreased/absent vibratory sensation bilateral feet to 128Hz tuning fork.     Paresthesias, and hyperesthesia bilateral feet with no clearly identified trigger or source.    Skin: Skin is warm and dry. He is not diaphoretic. No cyanosis. No pallor. Nails show no clubbing.   Skin is thin, atrophic, xerotic    Toenails 1-5 bilaterally discolored/yellowed, dystrophic, brittle with subungual debris.    See wound below    Psychiatric: His mood appears not anxious. His affect is not inappropriate. His speech is not slurred. He is not combative. He is  communicative. He is attentive.   Nursing note reviewed.      03/14/2024 03/07/2024 02/29/2024    Right     Evidence of excess pressure to distal 2nd digit             Left            02/22/2024    Pictures not loading into chart            02/15/2024    Sub 1st MTPJ, right          Left Foot                02/01/2024    Thick fibrinous base to the distal left hallux wound with hyperkeratotic tissue periwound and localized edema and erythema          Ulcer location:  Sub 1st metatarsophalangeal joint of the right foot  Measurements :  0.7 x 0.4 x 0.2  cm   Signs of infection:  Local edema and erythema  Drainage: Sanguinous  Purulence: No  Crepitus/fluctuance: No  Periwound: Reddened, Calloused  Base: Mixed Granular/Fibrotic  Depth: subcutaneous tissue  Probe to bone: No        Dry blood to the medial hallux margin of the right foot           01/18/2024 01/04/2024 12/28/2023    Sub 1st MTPJ        Lateral 5th MTPJ          12/18/23:              12/14/2023    Sub 1st metatarsophalangeal joint of the left foot        Lateral 5th metatarsophalangeal joint and digit of the left foot        Distal medial hallux of the left foot      Distal lesser digits of the left foot        12/05/2023        Post debridement    Ulcer location: sub 1st MTPJ of the left foot  Measurements : 1.2x0.6x0.2  cm   Signs of infection: local edema, erythema  Drainage: Sero-Sanguinous  Purulence: No  Crepitus/fluctuance: No  Periwound: Reddened, Macerated, Calloused  Base: Mixed Granular/Fibrotic  Depth: subcutaneous tissue  Probe to bone: No          09/13/2023 7/5/2023          Assessment:       Encounter Diagnoses   Name Primary?    Type 2 diabetes mellitus with left diabetic foot ulcer Yes    Type II diabetes mellitus with neurological manifestations     Toe ulcer, left, with fat layer exposed     Plantar fat pad atrophy              Plan:       Avni was seen today for  diabetes mellitus and wound check.    Diagnoses and all orders for this visit:    Type 2 diabetes mellitus with left diabetic foot ulcer    Type II diabetes mellitus with neurological manifestations    Toe ulcer, left, with fat layer exposed    Plantar fat pad atrophy        Education about the prevention of limb loss.    Discussed wound healing cycle, skin integrity, ways to care for skin.Counseled patient on the effects of biomechanical pressure and blood glucose on healing. He verbalizes understanding that it can increase the chances of delayed healing and this prolonged exposure leads to infection or progression of infection which subsequently can result in loss of limb.     All sites are cleansed of foreign material as much as possible and the base inspected for bone or abscess.  Base of right foot wound is thin epithelium, left is granular without bone or joint exposure.      Stagnation to left foot    Dressings:  cellerate to all sites    Offloading: football left and darco shoes    Follow-up: 1-2 weeks but should call [facility] immediately if any signs of infection, such as fever, chills, sweats, increased redness or pain.    Short-term goals include maintaining good offloading and minimizing bioburden, promoting granulation and epithelialization to healing.  Long-term goals include keeping the wound healed by good offloading and medical management under the direction of internist.    Shoe inspection. Diabetic Foot Education. Patient reminded of the importance of good nutrition and blood sugar control to help prevent podiatric complications of diabetes. Patient instructed on proper foot hygeine. We discussed wearing proper shoe gear, daily foot inspections, never walking without protective shoe gear, never putting sharp instruments to feet.

## 2025-01-14 NOTE — PATIENT INSTRUCTIONS
Recommend lotions: eucerin, aquaphor, A&D ointment, gold bond for diabetics, sween    Shoe recommendations: (try 6pm.com, [email or web address] , [email or web address], or shoes.com for discounted prices) you can visit DSW shoes in [place] as well    Asics (GT 1000 or gel foundations), new balance, saucony (stabil c3),  Drew (transcend), vionic, propet (tennis shoe)    soft brand, clarks, crocs, aerosoles, naturalizers, SAS, ecco, rafael, ant good, rockports (dress shoes)    Vionic, volitiles, burkenstocks, fitflops, propet (sandals)    Nike comfort thong sandals, crocs (house shoes)    Nail Home remedy:  Vicks Vapor rub OR Listerine and apple cider vinegar in a spray bottle to nails    Occasional soaks for 15-20 mins in luke warm water with 1 cup of listerine and 1 cup of apple cider vinegar are ok You may add several drops of oil of oregano or tea tree oil as well      Diabetes: Inspecting Your Feet  Diabetes increases your chances of developing foot problems. So inspect your feet every day. This helps you find small skin irritations before they become serious infections. If you have trouble seeing the bottoms of your feet, use a mirror or ask a family member or friend to help.     Pressure spots on the bottom of the foot are common areas where problems develop.   How to check your feet  Below are tips to help you look for foot problems. Try to check your feet at the same time each day, such as when you get out of bed in the morning:  · Check the top of each foot. The tops of toes, back of the heel, and outer edge of the foot can get a lot of rubbing from poor-fitting shoes.  · Check the bottom of each foot. Daily wear and tear often leads to problems at pressure spots.  · Check the toes and nails. Fungal infections often occur between toes. Toenail problems can also be a sign of fungal infections or lead to breaks in the skin.  · Check your shoes, too. Loose objects inside a shoe can injure the foot. Use your hand to feel  inside your shoes for things like robert, loose stitching, or rough areas that could irritate your skin.  Warning signs  Look for any color changes in the foot. Redness with streaks can signal a severe infection, which needs immediate medical attention. Tell your doctor right away if you have any of these problems:  · Swelling, sometimes with color changes, may be a sign of poor blood flow or infection. Symptoms include tenderness and an increase in the size of your foot.  · Warm or hot areas on your feet may be signs of infection. A foot that is cold may not be getting enough blood.  · Sensations such as burning, tingling, or pins and needles can be signs of a problem. Also check for areas that may be numb.  · Hot spots are caused by friction or pressure. Look for hot spots in areas that get a lot of rubbing. Hot spots can turn into blisters, calluses, or sores.  · Cracks and sores are caused by dry or irritated skin. They are a sign that the skin is breaking down, which can lead to infection.  · Toenail problems to watch for include nails growing into the skin (ingrown toenail) and causing redness or pain. Thick, yellow, or discolored nails can signal a fungal infection.  · Drainage and odor can develop from untreated sores and ulcers. Call your doctor right away if you notice white or yellow drainage, bleeding, or unpleasant odor.   © 8467-8809 [employer]. [address] [place] [address], [place] [address]. All rights reserved. This information is not intended as a substitute for professional medical care. Always follow your healthcare professional's instructions.         yes